# Patient Record
Sex: FEMALE | Race: BLACK OR AFRICAN AMERICAN | Employment: FULL TIME | URBAN - METROPOLITAN AREA
[De-identification: names, ages, dates, MRNs, and addresses within clinical notes are randomized per-mention and may not be internally consistent; named-entity substitution may affect disease eponyms.]

---

## 2017-01-10 ENCOUNTER — HOSPITAL ENCOUNTER (OUTPATIENT)
Dept: MRI IMAGING | Age: 52
Discharge: HOME | End: 2017-01-10
Attending: FAMILY MEDICINE
Payer: COMMERCIAL

## 2017-01-10 DIAGNOSIS — M54.32 LEFT SIDED SCIATICA: ICD-10-CM

## 2017-01-10 PROCEDURE — 72148 MRI LUMBAR SPINE W/O DYE: CPT

## 2018-07-03 ENCOUNTER — HOSPITAL ENCOUNTER (EMERGENCY)
Age: 53
Discharge: HOME | End: 2018-07-03
Attending: EMERGENCY MEDICINE
Payer: COMMERCIAL

## 2018-07-03 ENCOUNTER — APPOINTMENT (OUTPATIENT)
Dept: GENERAL RADIOLOGY | Age: 53
End: 2018-07-03
Attending: PHYSICIAN ASSISTANT
Payer: COMMERCIAL

## 2018-07-03 VITALS
OXYGEN SATURATION: 100 % | HEART RATE: 88 BPM | HEIGHT: 60 IN | WEIGHT: 270 LBS | RESPIRATION RATE: 17 BRPM | BODY MASS INDEX: 53.01 KG/M2 | TEMPERATURE: 98.4 F | DIASTOLIC BLOOD PRESSURE: 83 MMHG | SYSTOLIC BLOOD PRESSURE: 152 MMHG

## 2018-07-03 DIAGNOSIS — K59.00 CONSTIPATION, UNSPECIFIED CONSTIPATION TYPE: ICD-10-CM

## 2018-07-03 DIAGNOSIS — R10.13 ABDOMINAL PAIN, EPIGASTRIC: Primary | ICD-10-CM

## 2018-07-03 LAB
ALBUMIN SERPL-MCNC: 3.9 G/DL (ref 3.5–5)
ALBUMIN/GLOB SERPL: 1 {RATIO} (ref 1.1–2.2)
ALP SERPL-CCNC: 135 U/L (ref 45–117)
ALT SERPL-CCNC: 38 U/L (ref 12–78)
ANION GAP SERPL CALC-SCNC: 11 MMOL/L (ref 5–15)
AST SERPL-CCNC: 33 U/L (ref 15–37)
BASOPHILS # BLD: 0 K/UL (ref 0–0.1)
BASOPHILS NFR BLD: 0 % (ref 0–1)
BILIRUB SERPL-MCNC: 0.2 MG/DL (ref 0.2–1)
BUN SERPL-MCNC: 16 MG/DL (ref 6–20)
BUN/CREAT SERPL: 17 (ref 12–20)
CALCIUM SERPL-MCNC: 9.3 MG/DL (ref 8.5–10.1)
CHLORIDE SERPL-SCNC: 104 MMOL/L (ref 97–108)
CO2 SERPL-SCNC: 23 MMOL/L (ref 21–32)
CREAT SERPL-MCNC: 0.95 MG/DL (ref 0.55–1.02)
DIFFERENTIAL METHOD BLD: ABNORMAL
EOSINOPHIL # BLD: 0.1 K/UL (ref 0–0.4)
EOSINOPHIL NFR BLD: 2 % (ref 0–7)
ERYTHROCYTE [DISTWIDTH] IN BLOOD BY AUTOMATED COUNT: 12.6 % (ref 11.5–14.5)
GLOBULIN SER CALC-MCNC: 4.1 G/DL (ref 2–4)
GLUCOSE SERPL-MCNC: 90 MG/DL (ref 65–100)
HCT VFR BLD AUTO: 41.6 % (ref 35–47)
HGB BLD-MCNC: 13 G/DL (ref 11.5–16)
IMM GRANULOCYTES # BLD: 0 K/UL (ref 0–0.04)
IMM GRANULOCYTES NFR BLD AUTO: 0 % (ref 0–0.5)
LIPASE SERPL-CCNC: 179 U/L (ref 73–393)
LYMPHOCYTES # BLD: 4.1 K/UL (ref 0.8–3.5)
LYMPHOCYTES NFR BLD: 52 % (ref 12–49)
MCH RBC QN AUTO: 29 PG (ref 26–34)
MCHC RBC AUTO-ENTMCNC: 31.3 G/DL (ref 30–36.5)
MCV RBC AUTO: 92.9 FL (ref 80–99)
MONOCYTES # BLD: 0.5 K/UL (ref 0–1)
MONOCYTES NFR BLD: 7 % (ref 5–13)
NEUTS SEG # BLD: 3.1 K/UL (ref 1.8–8)
NEUTS SEG NFR BLD: 39 % (ref 32–75)
NRBC # BLD: 0 K/UL (ref 0–0.01)
NRBC BLD-RTO: 0 PER 100 WBC
PLATELET # BLD AUTO: 284 K/UL (ref 150–400)
PMV BLD AUTO: 10.4 FL (ref 8.9–12.9)
POTASSIUM SERPL-SCNC: 4.3 MMOL/L (ref 3.5–5.1)
PROT SERPL-MCNC: 8 G/DL (ref 6.4–8.2)
RBC # BLD AUTO: 4.48 M/UL (ref 3.8–5.2)
SODIUM SERPL-SCNC: 138 MMOL/L (ref 136–145)
TROPONIN I SERPL-MCNC: <0.05 NG/ML
WBC # BLD AUTO: 7.9 K/UL (ref 3.6–11)

## 2018-07-03 PROCEDURE — 74011250636 HC RX REV CODE- 250/636: Performed by: PHYSICIAN ASSISTANT

## 2018-07-03 PROCEDURE — 99284 EMERGENCY DEPT VISIT MOD MDM: CPT

## 2018-07-03 PROCEDURE — 96375 TX/PRO/DX INJ NEW DRUG ADDON: CPT

## 2018-07-03 PROCEDURE — 84484 ASSAY OF TROPONIN QUANT: CPT | Performed by: PHYSICIAN ASSISTANT

## 2018-07-03 PROCEDURE — 83690 ASSAY OF LIPASE: CPT | Performed by: PHYSICIAN ASSISTANT

## 2018-07-03 PROCEDURE — 36415 COLL VENOUS BLD VENIPUNCTURE: CPT | Performed by: PHYSICIAN ASSISTANT

## 2018-07-03 PROCEDURE — 74022 RADEX COMPL AQT ABD SERIES: CPT

## 2018-07-03 PROCEDURE — 96374 THER/PROPH/DIAG INJ IV PUSH: CPT

## 2018-07-03 PROCEDURE — 74011000250 HC RX REV CODE- 250: Performed by: PHYSICIAN ASSISTANT

## 2018-07-03 PROCEDURE — 74011250637 HC RX REV CODE- 250/637: Performed by: PHYSICIAN ASSISTANT

## 2018-07-03 PROCEDURE — 85025 COMPLETE CBC W/AUTO DIFF WBC: CPT | Performed by: PHYSICIAN ASSISTANT

## 2018-07-03 PROCEDURE — 93005 ELECTROCARDIOGRAM TRACING: CPT

## 2018-07-03 PROCEDURE — 80053 COMPREHEN METABOLIC PANEL: CPT | Performed by: PHYSICIAN ASSISTANT

## 2018-07-03 RX ORDER — FAMOTIDINE 20 MG/1
20 TABLET, FILM COATED ORAL 2 TIMES DAILY
Qty: 20 TAB | Refills: 0 | Status: SHIPPED | OUTPATIENT
Start: 2018-07-03 | End: 2018-07-13

## 2018-07-03 RX ORDER — ONDANSETRON 2 MG/ML
4 INJECTION INTRAMUSCULAR; INTRAVENOUS
Status: COMPLETED | OUTPATIENT
Start: 2018-07-03 | End: 2018-07-03

## 2018-07-03 RX ORDER — FAMOTIDINE 10 MG/ML
20 INJECTION INTRAVENOUS
Status: COMPLETED | OUTPATIENT
Start: 2018-07-03 | End: 2018-07-03

## 2018-07-03 RX ORDER — POLYETHYLENE GLYCOL 3350 17 G/17G
17 POWDER, FOR SOLUTION ORAL DAILY
Qty: 119 G | Refills: 0 | Status: SHIPPED | OUTPATIENT
Start: 2018-07-03 | End: 2018-10-12

## 2018-07-03 RX ADMIN — ONDANSETRON 4 MG: 2 INJECTION, SOLUTION INTRAMUSCULAR; INTRAVENOUS at 20:48

## 2018-07-03 RX ADMIN — FAMOTIDINE 20 MG: 10 INJECTION INTRAVENOUS at 20:49

## 2018-07-03 RX ADMIN — LIDOCAINE HYDROCHLORIDE 40 ML: 20 SOLUTION ORAL; TOPICAL at 21:39

## 2018-07-04 NOTE — ED TRIAGE NOTES
Pt c/o of mid epigastric pain from hiatal hernia that radiates into chest Pain started Saturday. Pt seen at [facility] and told to come to ED based on EKG. Pt reports nausea.

## 2018-07-04 NOTE — DISCHARGE INSTRUCTIONS
Abdominal Pain: Care Instructions  Your Care Instructions    Abdominal pain has many possible causes. Some aren't serious and get better on their own in a few days. Others need more testing and treatment. If your pain continues or gets worse, you need to be rechecked and may need more tests to find out what is wrong. You may need surgery to correct the problem. Don't ignore new symptoms, such as fever, nausea and vomiting, urination problems, pain that gets worse, and dizziness. These may be signs of a more serious problem. Your doctor may have recommended a follow-up visit in the next 8 to 12 hours. If you are not getting better, you may need more tests or treatment. The doctor has checked you carefully, but problems can develop later. If you notice any problems or new symptoms, get medical treatment right away. Follow-up care is a key part of your treatment and safety. Be sure to make and go to all appointments, and call your doctor if you are having problems. It's also a good idea to know your test results and keep a list of the medicines you take. How can you care for yourself at home? · Rest until you feel better. · To prevent dehydration, drink plenty of fluids, enough so that your urine is light yellow or clear like water. Choose water and other caffeine-free clear liquids until you feel better. If you have kidney, heart, or liver disease and have to limit fluids, talk with your doctor before you increase the amount of fluids you drink. · If your stomach is upset, eat mild foods, such as rice, dry toast or crackers, bananas, and applesauce. Try eating several small meals instead of two or three large ones. · Wait until 48 hours after all symptoms have gone away before you have spicy foods, alcohol, and drinks that contain caffeine. · Do not eat foods that are high in fat. · Avoid anti-inflammatory medicines such as aspirin, ibuprofen (Advil, Motrin), and naproxen (Aleve).  These can cause stomach upset. Talk to your doctor if you take daily aspirin for another health problem. When should you call for help? Call 911 anytime you think you may need emergency care. For example, call if:  ? · You passed out (lost consciousness). ? · You pass maroon or very bloody stools. ? · You vomit blood or what looks like coffee grounds. ? · You have new, severe belly pain. ?Call your doctor now or seek immediate medical care if:  ? · Your pain gets worse, especially if it becomes focused in one area of your belly. ? · You have a new or higher fever. ? · Your stools are black and look like tar, or they have streaks of blood. ? · You have unexpected vaginal bleeding. ? · You have symptoms of a urinary tract infection. These may include:  ¨ Pain when you urinate. ¨ Urinating more often than usual.  ¨ Blood in your urine. ? · You are dizzy or lightheaded, or you feel like you may faint. ? Watch closely for changes in your health, and be sure to contact your doctor if:  ? · You are not getting better after 1 day (24 hours). Where can you learn more? Go to http://mikey-luis.info/. Enter D347 in the search box to learn more about \"Abdominal Pain: Care Instructions. \"  Current as of: March 20, 2017  Content Version: 11.4  © 6387-8462 [employer]. Care instructions adapted under license by [employer] (which disclaims liability or warranty for this information). If you have questions about a medical condition or this instruction, always ask your healthcare professional. [place] [address] any warranty or liability for your use of this information. Constipation: Care Instructions  Your Care Instructions    Constipation means that you have a hard time passing stools (bowel movements). People pass stools from 3 times a day to once every 3 days. What is normal for you may be different.  Constipation may occur with pain in the rectum and cramping. The pain may get worse when you try to pass stools. Sometimes there are small amounts of bright red blood on toilet paper or the surface of stools. This is because of enlarged veins near the rectum (hemorrhoids). A few changes in your diet and lifestyle may help you avoid ongoing constipation. Your doctor may also prescribe medicine to help loosen your stool. Some medicines can cause constipation. These include pain medicines and antidepressants. Tell your doctor about all the medicines you take. Your doctor may want to make a medicine change to ease your symptoms. Follow-up care is a key part of your treatment and safety. Be sure to make and go to all appointments, and call your doctor if you are having problems. It's also a good idea to know your test results and keep a list of the medicines you take. How can you care for yourself at home? · Drink plenty of fluids, enough so that your urine is light yellow or clear like water. If you have kidney, heart, or liver disease and have to limit fluids, talk with your doctor before you increase the amount of fluids you drink. · Include high-fiber foods in your diet each day. These include fruits, vegetables, beans, and whole grains. · Get at least 30 minutes of exercise on most days of the week. Walking is a good choice. You also may want to do other activities, such as running, swimming, cycling, or playing tennis or team sports. · Take a fiber supplement, such as Citrucel or Metamucil, every day. Read and follow all instructions on the label. · Schedule time each day for a bowel movement. A daily routine may help. Take your time having your bowel movement. · Support your feet with a small step stool when you sit on the toilet. This helps flex your hips and places your pelvis in a squatting position. · Your doctor may recommend an over-the-counter laxative to relieve your constipation. Examples are Milk of Magnesia and MiraLax.  Read and follow all instructions on the label. Do not use laxatives on a long-term basis. When should you call for help? Call your doctor now or seek immediate medical care if:  ? · You have new or worse belly pain. ? · You have new or worse nausea or vomiting. ? · You have blood in your stools. ? Watch closely for changes in your health, and be sure to contact your doctor if:  ? · Your constipation is getting worse. ? · You do not get better as expected. Where can you learn more? Go to http://mikey-luis.info/. Enter 21  in the search box to learn more about \"Constipation: Care Instructions. \"  Current as of: March 20, 2017  Content Version: 11.4  © 5840-4047 [employer]. Care instructions adapted under license by [employer] (which disclaims liability or warranty for this information). If you have questions about a medical condition or this instruction, always ask your healthcare professional. [place] [address] any warranty or liability for your use of this information. We hope that we have addressed all of your medical concerns. The examination and treatment you received in the Emergency Department were for an emergent problem and were not intended as complete care. It is important that you follow up with your healthcare provider(s) for ongoing care. If your symptoms worsen or do not improve as expected, and you are unable to reach your usual health care provider(s), you should return to the Emergency Department. Today's healthcare is undergoing tremendous change, and patient satisfaction surveys are one of the many tools to assess the quality of medical care. You may receive a survey from the [employer] organization regarding your experience in the Emergency Department. I hope that your experience has been completely positive, particularly the medical care that I provided.   As such, please participate in the survey; anything less than excellent does not meet my expectations or intentions. [facility] and [facility] [place] participate in nationally recognized quality of care measures. If your blood pressure is greater than 120/80, as reported below, we urge that you seek medical care to address the potential of high blood pressure, commonly known as hypertension. Hypertension can be hereditary or can be caused by certain medical conditions, pain, stress, or \"white coat syndrome. \"       Please make an appointment with your health care provider(s) for follow up of your Emergency Department visit. VITALS:   Patient Vitals for the past 8 hrs:   Temp Pulse Resp BP SpO2   07/03/18 2111 - 93 25 140/83 99 %   07/03/18 2011 98.4 °F (36.9 °C) 93 18 (!) 192/115 99 %          Thank you for allowing us to provide you with medical care today. We realize that you have many choices for your emergency care needs. Please choose us in the future for any continued health care needs. Anand Mcqueen, [address] [place] [address].   Office: [phone]            Recent Results (from the past 24 hour(s))   EKG, 12 LEAD, INITIAL    Collection Time: 07/03/18  8:20 PM   Result Value Ref Range    Ventricular Rate 93 BPM    Atrial Rate 93 BPM    P-R Interval 164 ms    QRS Duration 80 ms    Q-T Interval 380 ms    QTC Calculation (Bezet) 472 ms    Calculated P Axis 40 degrees    Calculated R Axis -14 degrees    Calculated T Axis 3 degrees    Diagnosis       Normal sinus rhythm  Possible Left atrial enlargement  Inferior infarct , age undetermined  Abnormal ECG  When compared with ECG of 18-JUL-2016 10:02,  Inferior infarct is now present     CBC WITH AUTOMATED DIFF    Collection Time: 07/03/18  8:40 PM   Result Value Ref Range    WBC 7.9 3.6 - 11.0 K/uL    RBC 4.48 3.80 - 5.20 M/uL    HGB 13.0 11.5 - 16.0 g/dL    HCT 41.6 35.0 - 47.0 %    MCV 92.9 80.0 - 99.0 FL    MCH 29.0 26.0 - 34.0 PG MCHC 31.3 30.0 - 36.5 g/dL    RDW 12.6 11.5 - 14.5 %    PLATELET 383 119 - 247 K/uL    MPV 10.4 8.9 - 12.9 FL    NRBC 0.0 0  WBC    ABSOLUTE NRBC 0.00 0.00 - 0.01 K/uL    NEUTROPHILS 39 32 - 75 %    LYMPHOCYTES 52 (H) 12 - 49 %    MONOCYTES 7 5 - 13 %    EOSINOPHILS 2 0 - 7 %    BASOPHILS 0 0 - 1 %    IMMATURE GRANULOCYTES 0 0.0 - 0.5 %    ABS. NEUTROPHILS 3.1 1.8 - 8.0 K/UL    ABS. LYMPHOCYTES 4.1 (H) 0.8 - 3.5 K/UL    ABS. MONOCYTES 0.5 0.0 - 1.0 K/UL    ABS. EOSINOPHILS 0.1 0.0 - 0.4 K/UL    ABS. BASOPHILS 0.0 0.0 - 0.1 K/UL    ABS. IMM. GRANS. 0.0 0.00 - 0.04 K/UL    DF AUTOMATED     METABOLIC PANEL, COMPREHENSIVE    Collection Time: 07/03/18  8:40 PM   Result Value Ref Range    Sodium 138 136 - 145 mmol/L    Potassium 4.3 3.5 - 5.1 mmol/L    Chloride 104 97 - 108 mmol/L    CO2 23 21 - 32 mmol/L    Anion gap 11 5 - 15 mmol/L    Glucose 90 65 - 100 mg/dL    BUN 16 6 - 20 MG/DL    Creatinine 0.95 0.55 - 1.02 MG/DL    BUN/Creatinine ratio 17 12 - 20      GFR est AA >60 >60 ml/min/1.73m2    GFR est non-AA >60 >60 ml/min/1.73m2    Calcium 9.3 8.5 - 10.1 MG/DL    Bilirubin, total 0.2 0.2 - 1.0 MG/DL    ALT (SGPT) 38 12 - 78 U/L    AST (SGOT) 33 15 - 37 U/L    Alk. phosphatase 135 (H) 45 - 117 U/L    Protein, total 8.0 6.4 - 8.2 g/dL    Albumin 3.9 3.5 - 5.0 g/dL    Globulin 4.1 (H) 2.0 - 4.0 g/dL    A-G Ratio 1.0 (L) 1.1 - 2.2     LIPASE    Collection Time: 07/03/18  8:40 PM   Result Value Ref Range    Lipase 179 73 - 393 U/L   TROPONIN I    Collection Time: 07/03/18  8:40 PM   Result Value Ref Range    Troponin-I, Qt. <0.05 <0.05 ng/mL       Xr Abd Acute W 1 V Chest    Result Date: 7/3/2018  EXAM:  XR ABD ACUTE W 1 V CHEST INDICATION:  abdominal pain COMPARISON: 7/18/2016. FINDINGS: The upright chest radiograph demonstrates normal heart size. Interstitial thickening is unchanged in the perihilar regions bilaterally. No acute abnormality is identified.  Supine and upright views of the abdomen demonstrate a nonobstructive bowel gas pattern. There is no free intraperitoneal air. Surgical clips project over the right upper quadrant. The bones are within normal limits. Moderate fecal stasis is noted.      IMPRESSION: No acute process or change compared to the prior exam.

## 2018-07-04 NOTE — ED NOTES
PT REPORTS FEELING MUCH BETTER AFTER GI COCKTAIL. PROVIDER MADE AWARE. PROVIDED WITH DC INSTRUCTIONS BY PROVIDER. VERBALIZED UNDERSTANDING.  AMBULATED OUT OF ED WITH STEADY UPRIGHT GAIT

## 2018-07-04 NOTE — ED PROVIDER NOTES
HPI Comments: Harry Klein is a 50 y.o. female  who presents by private vehicle to ER with c/o Patient presents with:  Abdominal Pain  Chest Pain  Patient presents with complaints of epigastric abdominal pain. Patient reports symptoms started Saturday. Patient has history of hiatal hernia. Denies shortness of breath. She specifically denies any fevers, chills, nausea, vomiting,  shortness of breath, headache, rash, diarrhea, , urinary/bowel changes, sweating or weight loss. PCP: Daniel Bauer MD   PMHx significant for: Past Medical History:  2013: Asthma  No date: Diabetes ([facility] [address]  No date: Dyslipidemia  No date: Hypertension  2014: Obesity  No date: JENNIFER on CPAP   PSHx significant for: Past Surgical History:  : DELIVERY   : DELIVERY   No date: ENDOSCOPY, COLON, DIAGNOSTIC  : HX CHOLECYSTECTOMY  : HX HYSTERECTOMY      Comment: complete, endometriosis. Social Hx: Tobacco use: Smoking status: Never Smoker                                                              Smokeless status: Never Used                      ; EtOH use: The patient states she drinks 0 per week.; Illicit Drug use: Allergies:   -- Codeine -- Rash   -- Demerol (Meperidine) -- Rash   -- Pcn (Penicillins) -- Hives    There are no other complaints, changes or physical findings at this time. Patient is a 50 y.o. female presenting with abdominal pain and chest pain. The history is provided by the patient. Abdominal Pain    This is a new problem. The current episode started more than 2 days ago. The problem occurs constantly. The problem has not changed since onset. The pain is associated with an unknown factor. The pain is located in the epigastric region. The quality of the pain is sharp. The pain is at a severity of 7/10. The pain is severe. Associated symptoms include nausea and chest pain.  Pertinent negatives include no anorexia, no fever, no belching, no diarrhea, no flatus, no hematochezia, no melena, no vomiting, no constipation, no hematuria, no headaches, no arthralgias, no myalgias, no trauma, no testicular pain and no back pain. Nothing worsens the pain. The pain is relieved by nothing. Past workup includes no CT scan, no ultrasound. The patient's surgical history non-contributory. Chest Pain    Associated symptoms include abdominal pain and nausea. Pertinent negatives include no back pain, no fever, no headaches and no vomiting. Past Medical History:   Diagnosis Date    Asthma 2013    Diabetes ([address]     Dyslipidemia     Hypertension     Obesity 2014    JENNIFER on CPAP        Past Surgical History:   Procedure Laterality Date    DELIVERY   48    DELIVERY   14    ENDOSCOPY, COLON, DIAGNOSTIC      HX CHOLECYSTECTOMY      HX HYSTERECTOMY      complete, endometriosis. Family History:   Problem Relation Age of Onset    Other Mother      CAD   [facility] Hypertension Mother     Hypertension Father     Kidney Disease Father      End Stage Renal, Dialysis    Hypertension Brother     Diabetes Paternal Uncle     Other Maternal Grandmother      CAD    Heart Attack Maternal Grandmother     Cancer Maternal Grandfather      Throat Cancer    Dementia Paternal Grandmother      Alzheimer's Dementia    Cancer Paternal Grandfather      Colon Cancer    Diabetes Paternal Uncle        Social History     Social History    Marital status:      Spouse name: N/A    Number of children: N/A    Years of education: N/A     Occupational History    Not on file.      Social History Main Topics    Smoking status: Never Smoker    Smokeless tobacco: Never Used    Alcohol use No    Drug use: No    Sexual activity: Yes     Partners: Male     Birth control/ protection: Surgical     Other Topics Concern    Not on file     Social History Narrative         ALLERGIES: Codeine; Demerol [meperidine]; and Pcn [penicillins]    Review of Systems Constitutional: Negative. Negative for fever. HENT: Negative. Eyes: Negative. Respiratory: Negative. Cardiovascular: Positive for chest pain. Gastrointestinal: Positive for abdominal pain and nausea. Negative for anorexia, constipation, diarrhea, flatus, hematochezia, melena and vomiting. Endocrine: Negative. Genitourinary: Negative. Negative for hematuria and testicular pain. Musculoskeletal: Negative. Negative for arthralgias, back pain and myalgias. Skin: Negative. Allergic/Immunologic: Negative. Neurological: Negative. Negative for headaches. Hematological: Negative. Psychiatric/Behavioral: Negative. All other systems reviewed and are negative. Vitals:    07/03/18 2011 07/03/18 2111 07/03/18 2115 07/03/18 2145   BP: (!) 192/115 140/83 152/83    Pulse: 93 93 88 88   Resp: 18 25 19 17   Temp: 98.4 °F (36.9 °C)      SpO2: 99% 99% 99% 100%   Weight: 122.5 kg (270 lb)      Height: 5' (1.524 m)               Physical Exam   Constitutional: She is oriented to person, place, and time. She appears well-developed. HENT:   Head: Normocephalic and atraumatic. Right Ear: External ear normal.   Left Ear: External ear normal.   Nose: Nose normal.   Mouth/Throat: Oropharynx is clear and moist. No oropharyngeal exudate. Eyes: Conjunctivae, EOM and lids are normal. Right eye exhibits no discharge. Left eye exhibits no discharge. Neck: Normal range of motion. No tracheal deviation present. No thyromegaly present. Cardiovascular: Normal rate, regular rhythm, normal heart sounds and intact distal pulses. Pulmonary/Chest: Effort normal and breath sounds normal.   Abdominal: Soft. Normal appearance and bowel sounds are normal. There is tenderness in the epigastric area. Musculoskeletal: Normal range of motion. Neurological: She is alert and oriented to person, place, and time. Skin: Skin is warm and dry. Psychiatric: She has a normal mood and affect.  Judgment normal. MDM  Number of Diagnoses or Management Options  Abdominal pain, epigastric:   Constipation, unspecified constipation type:   Diagnosis management comments: Assesment/Plan- 50 y.o. Patient presents with:  Abdominal Pain  Chest Pain  differential includes: pancreatitis, cholecystitis, GERD, cardiac chest pain. Labs and imaging reviewed with xray showing constipation, patient feeling significantly improved after medications in ED. PE findings consistent with gastritis. Recommend PCP follow up. Patient educated on reasons to return to the ED.          Amount and/or Complexity of Data Reviewed  Clinical lab tests: ordered and reviewed  Tests in the radiology section of CPT®: ordered and reviewed  Tests in the medicine section of CPT®: ordered and reviewed  Discuss the patient with other providers: yes (Attending- Dr. Jesenia Gomez who also saw patient and agrees with plan)          ED Course       Procedures

## 2018-07-05 LAB
ATRIAL RATE: 93 BPM
CALCULATED P AXIS, ECG09: 40 DEGREES
CALCULATED R AXIS, ECG10: -14 DEGREES
CALCULATED T AXIS, ECG11: 3 DEGREES
DIAGNOSIS, 93000: NORMAL
P-R INTERVAL, ECG05: 164 MS
Q-T INTERVAL, ECG07: 380 MS
QRS DURATION, ECG06: 80 MS
QTC CALCULATION (BEZET), ECG08: 472 MS
VENTRICULAR RATE, ECG03: 93 BPM

## 2018-08-07 NOTE — PERIOP NOTES
[address] [place] [address] Dr Mahan Preprocedure Instructions      1. On the day of your surgery, please report to registration located on the 2nd floor of the  [facility]. yes    2. You must have a responsible adult to drive you to the hospital, stay at the hospital during your procedure and drive you home. If they leave your procedure will not be started (no exceptions). yes    3. Do not have anything to eat or drink (including water, gum, mints, coffee, and juice) after midnight. This does not apply to the medications you were instructed to take by your physician. yesIf you are currently taking Plavix, Coumadin, Aspirin, or other blood-thinning agents, contact your physician for special instructions. not applicable,    4. If you are having a procedure that requires bowel prep: We recommend that you have only clear liquids the day before your procedure and begin your bowel prep by 5:00 pm.  You may continue to drink clear liquids until midnight. If for any reason you are not able to complete your prep please notify your physician immediately. yes    5. Have a list of all current medications, including vitamins, herbal supplements and any other over the counter medications. yes    6. If you wear glasses, contacts, dentures and/or hearing aids, they may be removed prior to procedure, please bring a case to store them in. yes    7. You should understand that if you do not follow these instructions your procedure may be cancelled. If your physical condition changes (I.e. fever, cold or flu) please contact your doctor as soon as possible. 8. It is important that you be on time.   If for any reason you are unable to keep your appointment please call )- the day of or your physicians office prior to your scheduled procedure

## 2018-08-09 ENCOUNTER — APPOINTMENT (OUTPATIENT)
Dept: CT IMAGING | Age: 53
End: 2018-08-09
Attending: INTERNAL MEDICINE
Payer: COMMERCIAL

## 2018-08-09 ENCOUNTER — ANESTHESIA (OUTPATIENT)
Dept: ENDOSCOPY | Age: 53
End: 2018-08-09
Payer: COMMERCIAL

## 2018-08-09 ENCOUNTER — ANESTHESIA EVENT (OUTPATIENT)
Dept: ENDOSCOPY | Age: 53
End: 2018-08-09
Payer: COMMERCIAL

## 2018-08-09 ENCOUNTER — HOSPITAL ENCOUNTER (OUTPATIENT)
Age: 53
Setting detail: OUTPATIENT SURGERY
Discharge: HOME | End: 2018-08-09
Attending: INTERNAL MEDICINE | Admitting: INTERNAL MEDICINE
Payer: COMMERCIAL

## 2018-08-09 VITALS
HEART RATE: 89 BPM | HEIGHT: 60 IN | OXYGEN SATURATION: 99 % | TEMPERATURE: 98.3 F | DIASTOLIC BLOOD PRESSURE: 82 MMHG | RESPIRATION RATE: 23 BRPM | WEIGHT: 286 LBS | SYSTOLIC BLOOD PRESSURE: 132 MMHG | BODY MASS INDEX: 56.15 KG/M2

## 2018-08-09 LAB
H PYLORI FROM TISSUE: NEGATIVE
KIT LOT NO., HCLOLOT: NORMAL
NEGATIVE CONTROL: NEGATIVE
POSITIVE CONTROL: POSITIVE

## 2018-08-09 PROCEDURE — 74177 CT ABD & PELVIS W/CONTRAST: CPT

## 2018-08-09 PROCEDURE — 74011636320 HC RX REV CODE- 636/320: Performed by: RADIOLOGY

## 2018-08-09 PROCEDURE — 87077 CULTURE AEROBIC IDENTIFY: CPT | Performed by: INTERNAL MEDICINE

## 2018-08-09 PROCEDURE — 74011000250 HC RX REV CODE- 250: Performed by: ANESTHESIOLOGY

## 2018-08-09 PROCEDURE — 74011250636 HC RX REV CODE- 250/636

## 2018-08-09 PROCEDURE — 77030009426 HC FCPS BIOP ENDOSC BSC -B: Performed by: INTERNAL MEDICINE

## 2018-08-09 PROCEDURE — 76060000031 HC ANESTHESIA FIRST 0.5 HR: Performed by: INTERNAL MEDICINE

## 2018-08-09 PROCEDURE — 88305 TISSUE EXAM BY PATHOLOGIST: CPT | Performed by: INTERNAL MEDICINE

## 2018-08-09 PROCEDURE — 74011250636 HC RX REV CODE- 250/636: Performed by: INTERNAL MEDICINE

## 2018-08-09 PROCEDURE — 76040000019: Performed by: INTERNAL MEDICINE

## 2018-08-09 RX ORDER — EPINEPHRINE 0.1 MG/ML
1 INJECTION INTRACARDIAC; INTRAVENOUS
Status: DISCONTINUED | OUTPATIENT
Start: 2018-08-09 | End: 2018-08-09 | Stop reason: HOSPADM

## 2018-08-09 RX ORDER — SODIUM CHLORIDE 9 MG/ML
INJECTION, SOLUTION INTRAVENOUS
Status: DISCONTINUED | OUTPATIENT
Start: 2018-08-09 | End: 2018-08-09 | Stop reason: HOSPADM

## 2018-08-09 RX ORDER — ALBUTEROL SULFATE 0.83 MG/ML
2.5 SOLUTION RESPIRATORY (INHALATION)
Status: COMPLETED | OUTPATIENT
Start: 2018-08-09 | End: 2018-08-09

## 2018-08-09 RX ORDER — MIDAZOLAM HYDROCHLORIDE 1 MG/ML
.25-5 INJECTION, SOLUTION INTRAMUSCULAR; INTRAVENOUS
Status: DISCONTINUED | OUTPATIENT
Start: 2018-08-09 | End: 2018-08-09 | Stop reason: HOSPADM

## 2018-08-09 RX ORDER — SODIUM CHLORIDE 9 MG/ML
50 INJECTION, SOLUTION INTRAVENOUS CONTINUOUS
Status: DISPENSED | OUTPATIENT
Start: 2018-08-09 | End: 2018-08-09

## 2018-08-09 RX ORDER — PANTOPRAZOLE SODIUM 40 MG/1
40 TABLET, DELAYED RELEASE ORAL
COMMUNITY
End: 2018-10-12

## 2018-08-09 RX ORDER — DEXTROMETHORPHAN/PSEUDOEPHED 2.5-7.5/.8
1.2 DROPS ORAL
Status: DISCONTINUED | OUTPATIENT
Start: 2018-08-09 | End: 2018-08-09 | Stop reason: HOSPADM

## 2018-08-09 RX ORDER — RANITIDINE 150 MG/1
150 TABLET, FILM COATED ORAL
COMMUNITY
End: 2020-01-17

## 2018-08-09 RX ORDER — FLUMAZENIL 0.1 MG/ML
0.2 INJECTION INTRAVENOUS
Status: ACTIVE | OUTPATIENT
Start: 2018-08-09 | End: 2018-08-09

## 2018-08-09 RX ORDER — NALOXONE HYDROCHLORIDE 0.4 MG/ML
0.4 INJECTION, SOLUTION INTRAMUSCULAR; INTRAVENOUS; SUBCUTANEOUS
Status: ACTIVE | OUTPATIENT
Start: 2018-08-09 | End: 2018-08-09

## 2018-08-09 RX ORDER — PROPOFOL 10 MG/ML
INJECTION, EMULSION INTRAVENOUS AS NEEDED
Status: DISCONTINUED | OUTPATIENT
Start: 2018-08-09 | End: 2018-08-09 | Stop reason: HOSPADM

## 2018-08-09 RX ORDER — ATROPINE SULFATE 0.1 MG/ML
0.4 INJECTION INTRAVENOUS
Status: DISCONTINUED | OUTPATIENT
Start: 2018-08-09 | End: 2018-08-09 | Stop reason: HOSPADM

## 2018-08-09 RX ADMIN — ALBUTEROL SULFATE 2.5 MG: 2.5 SOLUTION RESPIRATORY (INHALATION) at 09:58

## 2018-08-09 RX ADMIN — PROPOFOL 50 MG: 10 INJECTION, EMULSION INTRAVENOUS at 08:58

## 2018-08-09 RX ADMIN — IOPAMIDOL 100 ML: 755 INJECTION, SOLUTION INTRAVENOUS at 12:55

## 2018-08-09 RX ADMIN — PROPOFOL 50 MG: 10 INJECTION, EMULSION INTRAVENOUS at 08:51

## 2018-08-09 RX ADMIN — SODIUM CHLORIDE: 9 INJECTION, SOLUTION INTRAVENOUS at 08:40

## 2018-08-09 RX ADMIN — PROPOFOL 100 MG: 10 INJECTION, EMULSION INTRAVENOUS at 08:45

## 2018-08-09 RX ADMIN — SODIUM CHLORIDE 50 ML/HR: 900 INJECTION, SOLUTION INTRAVENOUS at 07:45

## 2018-08-09 RX ADMIN — PROPOFOL 50 MG: 10 INJECTION, EMULSION INTRAVENOUS at 08:49

## 2018-08-09 RX ADMIN — PROPOFOL 50 MG: 10 INJECTION, EMULSION INTRAVENOUS at 08:55

## 2018-08-09 NOTE — PERIOP NOTES
At [address] states she felt better, but still feels tight. At first refused neb but states she wants it now.

## 2018-08-09 NOTE — DISCHARGE INSTRUCTIONS
Chastity Geiger M.D.  [phone]           2018  Cailin Stanford  :  1965  [facility] Medical Record Number:  [id number]        ENDOSCOPY FINDINGS:   Your endoscopy showed a moderate size hiatal hernia, otherwise appeared within normal, biopsies were obtained. EGD DISCHARGE INSTRUCTIONS    DISCOMFORT:  Sore throat- throat lozenges or warm salt water gargle  redness at IV site- apply warm compress to area; if redness or soreness persist- contact your physician  Gaseous discomfort- walking, belching will help relieve any discomfort  You may not operate a vehicle for 12 hours  You may not engage in an occupation involving machinery or appliances for rest of today  You may not drink alcoholic beverages for at least 12 hours  Avoid making any critical decisions for at least 24 hour    DIET:   You may resume your regular diet. ACTIVITY  Spend the remainder of the day resting -  avoid any strenuous activity. Avoid driving or operating machinery. CALL M.D. ANY SIGN OF   Increasing pain, nausea, vomiting  Abdominal distension (swelling)  New increased bleeding (oral or rectal)  Fever (chills)  Pain in chest area  Bloody discharge from nose or mouth  Shortness of breath    Follow-up Instructions:   Call Dr. Elizabeth Pantoja for any questions or problems. Telephone # [phone]  If biopsies were obtained, the results will be available  in  5 to 7 days. We will call you to notify you of these results. Continue same medications. Will get CT scan of abdomen and pelvis. Follow up in the office.

## 2018-08-09 NOTE — ROUTINE PROCESS
Maggie Delay  1965  [id number]    Situation:  Verbal report received from: Gaviota Robertson RN  Procedure: Procedure(s):  ESOPHAGOGASTRODUODENOSCOPY (EGD)  ESOPHAGOGASTRODUODENAL (EGD) BIOPSY    Background:    Preoperative diagnosis: ABDOMINAL PAIN, EPIGASTRIC PAIN  Postoperative diagnosis: Hiatal hernia. :  Dr. Kelsey Recinos  Assistant(s): Endoscopy Technician-1: Dearl Or  Endoscopy RN-1: Gaviota Robertson RN    Specimens:   ID Type Source Tests Collected by Time Destination   1 : Duodenum biopsy Preservative Duodenum  Faith Padgett MD 8/9/2018 0900 Pathology     H. Pylori  yes    Assessment:  Intra-procedure medications     Anesthesia gave intra-procedure sedation and medications, see anesthesia flow sheet yes    Intravenous fluids: NS@ KVO     Vital signs stable     Abdominal assessment: round and soft     Recommendation:  Discharge patient per MD order. Family or Friend   Permission to share finding with family or friend yes    Endoscopy discharge instructions have been reviewed and given to patient and spouse. The patient and spouse verbalized understanding and acceptance of instructions.

## 2018-08-09 NOTE — H&P
The patient is a 50year old female who presents with a complaint of Abdominal Pain. The patient presents for consultation at the request of . The onset of the abdominal pain has been gradual and has been occurring for 1 month with a increasing course . The abdominal pain is described as severe dull ache (\"it was sharp and umbearable before.) and  is described as being located in the upper abdomen (radiates down her abdomen.) .  The symptoms are aggravated by meals (1/2 to 1 hour after eating). The symptoms are relieved by vomiting, bowel movements and other (Sitting Up.). The symptoms have been associated with heartburn (states it has not been worse than normal.) and vomiting,  while the symptoms have not been associated with melena. Problem List/Past Medical Francisco Vasquez; 2018 12:02 PM)   Section    Hysterectomy    Cholecystectomy    Diabetes    Hypertension    Asthma      Past Surgical History Francisco Vasquez; 2018 12:01 PM)  None  [2018]: Allergies Francisco Vasquez; 2018 12:02 PM)  Codeine    Demerol    Penicillin      Medication History Francisco Vasquez; 2018 12:01 PM)  Protonix  (40MG Tablet DR, 1 Oral daily) Active. Medications Reconciled     Family History Francisco Vasquez; 2018 12:02 PM)  Hypertension   Father, Brother, Sister, Mother  Asthma   Mother    Social History Francisco Vasquez; 2018 12:02 PM)  Marital status     Employment status   Full-time  Non smoker / no tobacco use    No alcohol use      Health Maintenance History Francisco Vasquez; 2018 12:01 PM)  Pneumovax   none  Flu Vaccine   none        Review of Systems Francisco Vasquez; 2018 12:02 PM)  General Not Present- Chronic Fatigue, Poor Appetite, Weight Gain and Weight Loss. Skin Not Present- Itching, Rash and Skin Color Changes. HEENT Not Present- Hearing Loss, Ringing in the Ears and Vertigo. Respiratory Present- Asthma.  Not Present- Chronic Cough, Difficulty Breathing, Hoarseness, Lung Cancer, TB exposure and Wheezing. Cardiovascular Present- Hypertension. Not Present- Bypass Surgery, Chest Pain, Chest Pain with Exertion, Heart Murmur, History of Heart Attack, Pacemaker, Shortness of Breath, Use of Antibiotics before Dental Procedures and Use of Blood Thinners. Gastrointestinal Present- Bloody Stool, Gallbladder Disease, Heartburn and Indigestion. Not Present- Abdominal Pain, Black, Tarry Stool, Chest Pain, Cirrhosis, Colon Cancer, Constipation, Diarrhea, Difficulty Swallowing, Dysphagia, Esophageal Blockage, Esophageal Cancer, Hepatitis, Hiatal Hernia, History of Previous Colonoscopy, History of Previous Endoscopy, History of Previous GI X-rays, Intestinal Blockage, Jaundice, Nausea, Polyps, Stomach Cancer, Ulcer, Vomiting and Weight Loss. Female Genitourinary Not Present- Dialysis, Difficulty Urinating, Dysmenorrhea, Endometriosis, Excessive Menstrual Bleeding, Kidney Stones, Pregnancy and Urinary Tract Infection. Musculoskeletal Not Present- Arthritis, Collagen Vascular Disease, Hip Replacement Surgery and Knee Replacement Surgery. Neurological Not Present- Blurred Vision, Double Vision, Frequent Headaches, Migraine Headaches, Seizures, Stroke and Vertigo. Psychiatric Not Present- Depression, Insomnia, Panic Attacks and Suicidal Ideation. Endocrine Not Present- Complications from Diabetes, Diabetes and Thyroid Problems. Hematology Not Present- Anemia, Bruising and Easy Bleeding. Vitals Ruthie Alarcon; 7/23/2018 12:04 PM)  7/23/2018 11:59 AM  Weight: 293 lb   Height: 60 in   Body Surface Area: 2.2 m²   Body Mass Index: 57.22 kg/m²    Pulse: 94 (Regular)     BP: 152/102 (Sitting, Left Arm, Standard)              Physical Exam (Sedrick Ruiz; 7/23/2018 12:51 PM)  General  Mental Status - Alert. General Appearance - Cooperative, Pleasant, Not in acute distress. Orientation - Oriented X3.     Integumentary  General Characteristics  Color - normal coloration of skin. Head and Neck  Neck  Global Assessment - supple. Eye  Sclera/Conjunctiva - Bilateral - No Jaundice. Chest and Lung Exam  Chest and lung exam reveals  - quiet, even and easy respiratory effort with no use of accessory muscles. Auscultation  Breath sounds - Normal. Adventitious sounds - No Adventitious sounds. Cardiovascular  Auscultation  Rhythm - Regular, No Tachycardia, No Bradycardia . Heart Sounds - Normal heart sounds , S1 WNL and S2 WNL, No S3, No Summation Gallop. Murmurs & Other Heart Sounds - Auscultation of the heart reveals - No Murmurs. Abdomen  Palpation/Percussion  Tenderness - Non-Tender. Rebound tenderness - No rebound. Rigidity (guarding) - No Rigidity. Dullness to percussion - No abnormal dullness to percussion. Liver - No hepatosplenomegaly. Abdominal Mass Palpable - No masses. Other Characteristics - No Ascites. Auscultation  Auscultation of the abdomen reveals - Bowel sounds normal, No Abdominal bruits and No Succussion splash. Rectal - Did not examine. Peripheral Vascular  Lower Extremity  Palpation - Edema - Left - No edema. Right - No edema. Neurologic  Motor  Involuntary Movements - Asterixis - not present. Assessment & Plan (Sedrick CLAUDIASumi Jimenez [facility]; 7/23/2018 12:53 PM)  Constipation (564.00  K59.00)  Story: The patient presents with abdominal pain that is worse when she eats, better when she has a bowel movement. No alarm symptoms. X ray done in ER shows moderate amt of stool in the colon. She does not feel relief after she has bowel movements. Impression: Plan: I have instructed her to take high doses of miralax until she gets diarrhea. Then, she is to reduce to one daily dose. She will call in one week to let me know how she is doin. Consider colonoscopy if she does not improve. Follow Up: PRN.   Current Plans  Pt Education - How to access health information online: discussed with patient and provided information.   Signed electronically by LIBRA Gan (7/23/2018 12:55 PM)

## 2018-08-09 NOTE — PERIOP NOTES
States her breathing feels better after neb. IV converted to saline lock. Ready for discharge. To radiology via wheelchair for CT, will be discharged from there.

## 2018-08-09 NOTE — IP AVS SNAPSHOT
[place] [address] [facility] 
[phone] Patient: Laurel Gee MRN: [id number] :1965 About your hospitalization You were admitted on:  2018 You last received care in the:  [facility] ENDOSCOPY You were discharged on:  2018 Why you were hospitalized Your primary diagnosis was:  Not on File Follow-up Information None Discharge Orders None A check rojelio indicates which time of day the medication should be taken. My Medications CHANGE how you take these medications Instructions Each Dose to Equal  
 Morning Noon Evening Bedtime  
 fluticasone 50 mcg/actuation nasal spray Commonly known as:  Macie Lobo What changed:   
- when to take this 
- reasons to take this Your last dose was: Your next dose is: 2 Sprays by Both Nostrils route daily. 2 Spray CONTINUE taking these medications Instructions Each Dose to Equal  
 Morning Noon Evening Bedtime  
 albuterol 90 mcg/actuation inhaler Commonly known as:  PROVENTIL HFA, VENTOLIN HFA, PROAIR HFA Your last dose was: Your next dose is: Take 2 Puffs by inhalation every six (6) hours as needed for Wheezing. 2 Puff  
    
   
   
   
  
 polyethylene glycol 17 gram/dose powder Commonly known as:  Meka Lila Your last dose was: Your next dose is: Take 17 g by mouth daily. 1 tablespoon with 8 oz of water daily 17 g PROTONIX 40 mg tablet Generic drug:  pantoprazole Your last dose was: Your next dose is: Take 40 mg by mouth daily as needed. Indications: gastroesophageal reflux disease 40 mg  
    
   
   
   
  
 psyllium Powd Commonly known as:  METAMUCIL Your last dose was: Your next dose is: Take 1 Dose by mouth daily. Indications: constipation 1 Dose ZANTAC 150 mg tablet Generic drug:  raNITIdine Your last dose was: Your next dose is: Take 150 mg by mouth two (2) times daily as needed for Indigestion. Indications: gastroesophageal reflux disease 150 mg Discharge Instructions Christine Mukherjee M.D. 
[phone] 
        
2018 Buster Arriola :  1965 [address] [place] [address] Record Number:  [id number] ENDOSCOPY FINDINGS: 
 Your endoscopy showed a moderate size hiatal hernia, otherwise appeared within normal, biopsies were obtained. EGD DISCHARGE INSTRUCTIONS DISCOMFORT: 
Sore throat- throat lozenges or warm salt water gargle 
redness at IV site- apply warm compress to area; if redness or soreness persist- contact your physician Gaseous discomfort- walking, belching will help relieve any discomfort You may not operate a vehicle for 12 hours You may not engage in an occupation involving machinery or appliances for rest of today You may not drink alcoholic beverages for at least 12 hours Avoid making any critical decisions for at least 24 hour DIET: 
 You may resume your regular diet. ACTIVITY Spend the remainder of the day resting -  avoid any strenuous activity. Avoid driving or operating machinery. CALL M.D. ANY SIGN OF Increasing pain, nausea, vomiting Abdominal distension (swelling) New increased bleeding (oral or rectal) Fever (chills) Pain in chest area Bloody discharge from nose or mouth Shortness of breath Follow-up Instructions: 
 Call Dr. Xiomara Hardin for any questions or problems. Telephone # [phone] If biopsies were obtained, the results will be available  in  5 to 7 days. We will call you to notify you of these results. Continue same medications. Will get CT scan of abdomen and pelvis. Follow up in the office. Introducing [facility]! Levar Garsia introduces [employer] patient portal. Now you can access parts of your medical record, email your doctor's office, and request medication refills online. 1. In your internet browser, go to [email or web address] 2. Click on the First Time User? Click Here link in the Sign In box. You will see the New Member Sign Up page. 3. Enter your VEEDIMS Access Code exactly as it appears below. You will not need to use this code after youve completed the sign-up process. If you do not sign up before the expiration date, you must request a new code. · VEEDIMS Access Code: [email or web address] Expires: 10/1/2018  7:52 PM 
 
4. Enter the last four digits of your Social Security Number (xxxx) and Date of Birth (mm/dd/yyyy) as indicated and click Submit. You will be taken to the next sign-up page. 5. Create a VEEDIMS ID. This will be your VEEDIMS login ID and cannot be changed, so think of one that is secure and easy to remember. 6. Create a VEEDIMS password. You can change your password at any time. 7. Enter your Password Reset Question and Answer. This can be used at a later time if you forget your password. 8. Enter your e-mail address. You will receive e-mail notification when new information is available in [address] 9. Click Sign Up. You can now view and download portions of your medical record. 10. Click the Download Summary menu link to download a portable copy of your medical information. If you have questions, please visit the Frequently Asked Questions section of the VEEDIMS website. Remember, VEEDIMS is NOT to be used for urgent needs. For medical emergencies, dial 911. Now available from your iPhone and Android! Introducing Anuj Ware As a [place] Garsia patient, I wanted to make you aware of our electronic visit tool called Anuj Ware. [employer]EAR[employer] allows you to connect within minutes with a medical provider 24 hours a day, seven days a week via a mobile device or tablet or logging into a secure website from your computer. You can access ORDISSIMO from anywhere in the [place]. A virtual visit might be right for you when you have a simple condition and feel like you just dont want to get out of bed, or cant get away from work for an appointment, when your regular Roxy Linger provider is not available (evenings, weekends or holidays), or when youre out of town and need minor care. Electronic visits cost only $49 and if the [employer]EARTH[employer] provider determines a prescription is needed to treat your condition, one can be electronically transmitted to a nearby pharmacy*. Please take a moment to enroll today if you have not already done so. The enrollment process is free and takes just a few minutes. To enroll, please download the [employer]EARTHNET ed to your tablet or phone, or visit [email or web address] to enroll on your computer. And, as an [address] [place] [address] patient with a Viewabill account, the results of your visits will be scanned into your electronic medical record and your primary care provider will be able to view the scanned results. We urge you to continue to see your regular [employer] provider for your ongoing medical care. And while your primary care provider may not be the one available when you seek a ORDISSIMO virtual visit, the peace of mind you get from getting a real diagnosis real time can be priceless. For more information on ORDISSIMO, view our Frequently Asked Questions (FAQs) at [email or web address]. Sincerely, 
 
Reid Pérez MD 
Chief Medical Officer Liza Gray *:  certain medications cannot be prescribed via ORDISSIMO Providers Seen During Your Hospitalization Provider Specialty Primary office phone Tiffanie Meeks MD Gastroenterology [phone] Your Primary Care Physician (PCP) Primary Care Physician Office Phone Office Fax Isabela Isidro [phone] You are allergic to the following Allergen Reactions Pcn (Penicillins) Hives Recent Documentation Height Weight Breastfeeding? BMI OB Status Smoking Status 1.524 m 129.7 kg No 55.86 kg/m2 Hysterectomy Never Smoker Emergency Contacts Name Discharge Info Relation Home Work Mobile [facility] DISCHARGE CAREGIVER [3] Spouse [3] [phone] Tyesha Montanez  Mother [14] [phone] Patient Belongings The following personal items are in your possession at time of discharge: 
  Dental Appliances: None  Visual Aid: None Please provide this summary of care documentation to your next provider. Signatures-by signing, you are acknowledging that this After Visit Summary has been reviewed with you and you have received a copy. Patient Signature:  ____________________________________________________________ Date:  ____________________________________________________________  
  
Marlee Coto Provider Signature:  ____________________________________________________________ Date:  ____________________________________________________________

## 2018-08-09 NOTE — PROCEDURES
Heather Currie M.D.  [phone]           2018                EGD Operative Report  Buster Arriola  :  1965  Bhavik Laureano Medical Record Number:  [id number]      Indication:  Abdominal pain, epigastric     : Duane Ashley MD    Referring Provider:  Obinna Quezada MD      Anesthesia/Sedation:  MAC anesthesia    Airway assessment: No airway problems anticipated    Pre-Procedural Exam:      Airway: clear, no airway problems anticipated  Heart: RRR, without gallops or rubs  Lungs: clear bilaterally without wheezes, crackles, or rhonchi  Abdomen: soft, nontender, nondistended, bowel sounds present  Mental Status: awake, alert and oriented to person, place and time       Procedure Details     After infomed consent was obtained for the procedure, with all risks and benefits of procedure explained the patient was taken to the endoscopy suite and placed in the left lateral decubitus position. Following sequential administration of sedation as per above, the endoscope was inserted into the mouth and advanced under direct vision to second portion of the duodenum. A careful inspection was made as the gastroscope was withdrawn, including a retroflexed view of the proximal stomach; findings and interventions are described below. Findings:   Esophagus:normal  Stomach: Moderate size hiatal hernia seen, otherwise mucosa within normal throughout the stomach. Duodenum/jejunum: normal    Therapies:  none    Specimens: YOLIS test and duodenum           Complications:   None; patient tolerated the procedure well. EBL:  None. Impression:    Moderate size hiatal hernia, otherwise mucosa within normal.    Recommendations:    -Continue acid suppression.  -Follow-up on biopsy results  -Will proceed with CT scan of abdomen and pelvis, and follow-up in the office.     Duane Ashley MD

## 2018-08-09 NOTE — PERIOP NOTES
Arrived in recovery coughing when drowsy. More awake now, still coughing and states her chest is tight. Dr. Concha Gil notified and states she will come see the patient.

## 2018-10-12 ENCOUNTER — HOSPITAL ENCOUNTER (INPATIENT)
Age: 53
LOS: 2 days | Discharge: HOME | DRG: 194 | End: 2018-10-14
Attending: EMERGENCY MEDICINE | Admitting: INTERNAL MEDICINE
Payer: COMMERCIAL

## 2018-10-12 ENCOUNTER — APPOINTMENT (OUTPATIENT)
Dept: CT IMAGING | Age: 53
DRG: 194 | End: 2018-10-12
Attending: EMERGENCY MEDICINE
Payer: COMMERCIAL

## 2018-10-12 ENCOUNTER — APPOINTMENT (OUTPATIENT)
Dept: GENERAL RADIOLOGY | Age: 53
DRG: 194 | End: 2018-10-12
Attending: EMERGENCY MEDICINE
Payer: COMMERCIAL

## 2018-10-12 DIAGNOSIS — J18.9 PNEUMONIA OF LEFT LOWER LOBE DUE TO INFECTIOUS ORGANISM: Primary | ICD-10-CM

## 2018-10-12 DIAGNOSIS — R06.82 TACHYPNEA: ICD-10-CM

## 2018-10-12 DIAGNOSIS — R79.89 ELEVATED LACTIC ACID LEVEL: ICD-10-CM

## 2018-10-12 LAB
ALBUMIN SERPL-MCNC: 3.9 G/DL (ref 3.5–5)
ALBUMIN/GLOB SERPL: 0.8 {RATIO} (ref 1.1–2.2)
ALP SERPL-CCNC: 149 U/L (ref 45–117)
ALT SERPL-CCNC: 46 U/L (ref 12–78)
ANION GAP SERPL CALC-SCNC: 14 MMOL/L (ref 5–15)
AST SERPL-CCNC: 56 U/L (ref 15–37)
BASOPHILS # BLD: 0.1 K/UL (ref 0–0.1)
BASOPHILS NFR BLD: 1 % (ref 0–1)
BILIRUB SERPL-MCNC: 0.4 MG/DL (ref 0.2–1)
BNP SERPL-MCNC: 100 PG/ML (ref 0–125)
BUN SERPL-MCNC: 17 MG/DL (ref 6–20)
BUN/CREAT SERPL: 17 (ref 12–20)
CALCIUM SERPL-MCNC: 9.2 MG/DL (ref 8.5–10.1)
CHLORIDE SERPL-SCNC: 106 MMOL/L (ref 97–108)
CO2 SERPL-SCNC: 18 MMOL/L (ref 21–32)
COMMENT, HOLDF: NORMAL
CREAT SERPL-MCNC: 1 MG/DL (ref 0.55–1.02)
DIFFERENTIAL METHOD BLD: ABNORMAL
EOSINOPHIL # BLD: 0.1 K/UL (ref 0–0.4)
EOSINOPHIL NFR BLD: 1 % (ref 0–7)
ERYTHROCYTE [DISTWIDTH] IN BLOOD BY AUTOMATED COUNT: 13.1 % (ref 11.5–14.5)
EST. AVERAGE GLUCOSE BLD GHB EST-MCNC: 120 MG/DL
FLUAV AG NPH QL IA: NEGATIVE
FLUBV AG NOSE QL IA: NEGATIVE
GLOBULIN SER CALC-MCNC: 4.8 G/DL (ref 2–4)
GLUCOSE SERPL-MCNC: 103 MG/DL (ref 65–100)
HBA1C MFR BLD: 5.8 % (ref 4.2–6.3)
HCT VFR BLD AUTO: 41.6 % (ref 35–47)
HGB BLD-MCNC: 13.4 G/DL (ref 11.5–16)
IMM GRANULOCYTES # BLD: 0 K/UL (ref 0–0.04)
IMM GRANULOCYTES NFR BLD AUTO: 1 % (ref 0–0.5)
LACTATE BLD-SCNC: 3.6 MMOL/L (ref 0.4–2)
LACTATE SERPL-SCNC: 1.6 MMOL/L (ref 0.4–2)
LYMPHOCYTES # BLD: 4.3 K/UL (ref 0.8–3.5)
LYMPHOCYTES NFR BLD: 50 % (ref 12–49)
MCH RBC QN AUTO: 29.6 PG (ref 26–34)
MCHC RBC AUTO-ENTMCNC: 32.2 G/DL (ref 30–36.5)
MCV RBC AUTO: 92 FL (ref 80–99)
MONOCYTES # BLD: 0.6 K/UL (ref 0–1)
MONOCYTES NFR BLD: 7 % (ref 5–13)
NEUTS SEG # BLD: 3.5 K/UL (ref 1.8–8)
NEUTS SEG NFR BLD: 40 % (ref 32–75)
NRBC # BLD: 0 K/UL (ref 0–0.01)
NRBC BLD-RTO: 0 PER 100 WBC
PLATELET # BLD AUTO: 304 K/UL (ref 150–400)
PMV BLD AUTO: 11.2 FL (ref 8.9–12.9)
POTASSIUM SERPL-SCNC: 5.1 MMOL/L (ref 3.5–5.1)
POTASSIUM SERPL-SCNC: 5.7 MMOL/L (ref 3.5–5.1)
PROT SERPL-MCNC: 8.7 G/DL (ref 6.4–8.2)
RBC # BLD AUTO: 4.52 M/UL (ref 3.8–5.2)
SAMPLES BEING HELD,HOLD: NORMAL
SODIUM SERPL-SCNC: 138 MMOL/L (ref 136–145)
TROPONIN I SERPL-MCNC: <0.05 NG/ML
WBC # BLD AUTO: 8.6 K/UL (ref 3.6–11)

## 2018-10-12 PROCEDURE — 74011250637 HC RX REV CODE- 250/637: Performed by: INTERNAL MEDICINE

## 2018-10-12 PROCEDURE — 74011000258 HC RX REV CODE- 258: Performed by: EMERGENCY MEDICINE

## 2018-10-12 PROCEDURE — 85025 COMPLETE CBC W/AUTO DIFF WBC: CPT | Performed by: EMERGENCY MEDICINE

## 2018-10-12 PROCEDURE — 96365 THER/PROPH/DIAG IV INF INIT: CPT

## 2018-10-12 PROCEDURE — 96361 HYDRATE IV INFUSION ADD-ON: CPT

## 2018-10-12 PROCEDURE — 80053 COMPREHEN METABOLIC PANEL: CPT | Performed by: EMERGENCY MEDICINE

## 2018-10-12 PROCEDURE — 94640 AIRWAY INHALATION TREATMENT: CPT

## 2018-10-12 PROCEDURE — 83605 ASSAY OF LACTIC ACID: CPT

## 2018-10-12 PROCEDURE — 94760 N-INVAS EAR/PLS OXIMETRY 1: CPT

## 2018-10-12 PROCEDURE — 74011636320 HC RX REV CODE- 636/320: Performed by: EMERGENCY MEDICINE

## 2018-10-12 PROCEDURE — 84145 PROCALCITONIN (PCT): CPT | Performed by: INTERNAL MEDICINE

## 2018-10-12 PROCEDURE — 93005 ELECTROCARDIOGRAM TRACING: CPT

## 2018-10-12 PROCEDURE — 99285 EMERGENCY DEPT VISIT HI MDM: CPT

## 2018-10-12 PROCEDURE — 83605 ASSAY OF LACTIC ACID: CPT | Performed by: EMERGENCY MEDICINE

## 2018-10-12 PROCEDURE — 87040 BLOOD CULTURE FOR BACTERIA: CPT | Performed by: EMERGENCY MEDICINE

## 2018-10-12 PROCEDURE — 87804 INFLUENZA ASSAY W/OPTIC: CPT | Performed by: INTERNAL MEDICINE

## 2018-10-12 PROCEDURE — 65270000029 HC RM PRIVATE

## 2018-10-12 PROCEDURE — 71045 X-RAY EXAM CHEST 1 VIEW: CPT

## 2018-10-12 PROCEDURE — 74011250636 HC RX REV CODE- 250/636: Performed by: EMERGENCY MEDICINE

## 2018-10-12 PROCEDURE — 94664 DEMO&/EVAL PT USE INHALER: CPT

## 2018-10-12 PROCEDURE — 71275 CT ANGIOGRAPHY CHEST: CPT

## 2018-10-12 PROCEDURE — 77030029684 HC NEB SM VOL KT MONA -A

## 2018-10-12 PROCEDURE — 36415 COLL VENOUS BLD VENIPUNCTURE: CPT | Performed by: INTERNAL MEDICINE

## 2018-10-12 PROCEDURE — 83880 ASSAY OF NATRIURETIC PEPTIDE: CPT | Performed by: EMERGENCY MEDICINE

## 2018-10-12 PROCEDURE — 84132 ASSAY OF SERUM POTASSIUM: CPT | Performed by: EMERGENCY MEDICINE

## 2018-10-12 PROCEDURE — 83036 HEMOGLOBIN GLYCOSYLATED A1C: CPT | Performed by: INTERNAL MEDICINE

## 2018-10-12 PROCEDURE — 74011000250 HC RX REV CODE- 250: Performed by: EMERGENCY MEDICINE

## 2018-10-12 PROCEDURE — 84484 ASSAY OF TROPONIN QUANT: CPT | Performed by: EMERGENCY MEDICINE

## 2018-10-12 PROCEDURE — 74011000250 HC RX REV CODE- 250: Performed by: INTERNAL MEDICINE

## 2018-10-12 RX ORDER — HYDRALAZINE HYDROCHLORIDE 20 MG/ML
20 INJECTION INTRAMUSCULAR; INTRAVENOUS
Status: DISCONTINUED | OUTPATIENT
Start: 2018-10-12 | End: 2018-10-14 | Stop reason: HOSPADM

## 2018-10-12 RX ORDER — HYDROCODONE BITARTRATE AND ACETAMINOPHEN 5; 325 MG/1; MG/1
1 TABLET ORAL
Status: DISCONTINUED | OUTPATIENT
Start: 2018-10-12 | End: 2018-10-14 | Stop reason: HOSPADM

## 2018-10-12 RX ORDER — BISACODYL 5 MG
5 TABLET, DELAYED RELEASE (ENTERIC COATED) ORAL DAILY PRN
Status: DISCONTINUED | OUTPATIENT
Start: 2018-10-12 | End: 2018-10-14 | Stop reason: HOSPADM

## 2018-10-12 RX ORDER — SODIUM CHLORIDE 0.9 % (FLUSH) 0.9 %
5-10 SYRINGE (ML) INJECTION AS NEEDED
Status: DISCONTINUED | OUTPATIENT
Start: 2018-10-12 | End: 2018-10-14 | Stop reason: HOSPADM

## 2018-10-12 RX ORDER — OLOPATADINE HYDROCHLORIDE 7 MG/ML
1 SOLUTION OPHTHALMIC DAILY
COMMUNITY
End: 2020-01-17

## 2018-10-12 RX ORDER — LEVOFLOXACIN 5 MG/ML
750 INJECTION, SOLUTION INTRAVENOUS EVERY 24 HOURS
Status: DISCONTINUED | OUTPATIENT
Start: 2018-10-13 | End: 2018-10-14 | Stop reason: HOSPADM

## 2018-10-12 RX ORDER — ENOXAPARIN SODIUM 100 MG/ML
40 INJECTION SUBCUTANEOUS EVERY 24 HOURS
Status: DISCONTINUED | OUTPATIENT
Start: 2018-10-12 | End: 2018-10-14 | Stop reason: HOSPADM

## 2018-10-12 RX ORDER — ARFORMOTEROL TARTRATE 15 UG/2ML
15 SOLUTION RESPIRATORY (INHALATION)
Status: DISCONTINUED | OUTPATIENT
Start: 2018-10-12 | End: 2018-10-14 | Stop reason: HOSPADM

## 2018-10-12 RX ORDER — LEVOFLOXACIN 5 MG/ML
750 INJECTION, SOLUTION INTRAVENOUS
Status: COMPLETED | OUTPATIENT
Start: 2018-10-12 | End: 2018-10-12

## 2018-10-12 RX ORDER — SODIUM CHLORIDE 0.9 % (FLUSH) 0.9 %
5-10 SYRINGE (ML) INJECTION EVERY 8 HOURS
Status: DISCONTINUED | OUTPATIENT
Start: 2018-10-12 | End: 2018-10-14 | Stop reason: HOSPADM

## 2018-10-12 RX ORDER — BUDESONIDE 0.5 MG/2ML
500 INHALANT ORAL
Status: DISCONTINUED | OUTPATIENT
Start: 2018-10-12 | End: 2018-10-14 | Stop reason: HOSPADM

## 2018-10-12 RX ORDER — ACETAMINOPHEN 325 MG/1
650 TABLET ORAL
Status: DISCONTINUED | OUTPATIENT
Start: 2018-10-12 | End: 2018-10-14 | Stop reason: HOSPADM

## 2018-10-12 RX ORDER — SODIUM CHLORIDE 0.9 % (FLUSH) 0.9 %
10 SYRINGE (ML) INJECTION
Status: COMPLETED | OUTPATIENT
Start: 2018-10-12 | End: 2018-10-12

## 2018-10-12 RX ORDER — THERA TABS 400 MCG
1 TAB ORAL DAILY
COMMUNITY

## 2018-10-12 RX ORDER — GUAIFENESIN 600 MG/1
600 TABLET, EXTENDED RELEASE ORAL EVERY 12 HOURS
Status: DISCONTINUED | OUTPATIENT
Start: 2018-10-12 | End: 2018-10-14 | Stop reason: HOSPADM

## 2018-10-12 RX ORDER — ALBUTEROL SULFATE 0.83 MG/ML
5 SOLUTION RESPIRATORY (INHALATION)
Status: DISCONTINUED | OUTPATIENT
Start: 2018-10-12 | End: 2018-10-14 | Stop reason: HOSPADM

## 2018-10-12 RX ADMIN — Medication 10 ML: at 10:42

## 2018-10-12 RX ADMIN — ALBUTEROL SULFATE 5 MG: 2.5 SOLUTION RESPIRATORY (INHALATION) at 19:36

## 2018-10-12 RX ADMIN — ALBUTEROL SULFATE 1 DOSE: 2.5 SOLUTION RESPIRATORY (INHALATION) at 08:20

## 2018-10-12 RX ADMIN — GUAIFENESIN 600 MG: 600 TABLET, EXTENDED RELEASE ORAL at 21:56

## 2018-10-12 RX ADMIN — BUDESONIDE 500 MCG: 0.5 INHALANT RESPIRATORY (INHALATION) at 19:37

## 2018-10-12 RX ADMIN — ALBUTEROL SULFATE 5 MG: 2.5 SOLUTION RESPIRATORY (INHALATION) at 17:46

## 2018-10-12 RX ADMIN — ALBUTEROL SULFATE 5 MG: 2.5 SOLUTION RESPIRATORY (INHALATION) at 23:10

## 2018-10-12 RX ADMIN — SODIUM CHLORIDE 1000 ML: 900 INJECTION, SOLUTION INTRAVENOUS at 09:13

## 2018-10-12 RX ADMIN — Medication 10 ML: at 21:56

## 2018-10-12 RX ADMIN — SODIUM CHLORIDE 100 ML: 900 INJECTION, SOLUTION INTRAVENOUS at 10:42

## 2018-10-12 RX ADMIN — SODIUM CHLORIDE 1000 ML: 900 INJECTION, SOLUTION INTRAVENOUS at 08:18

## 2018-10-12 RX ADMIN — IOPAMIDOL 100 ML: 755 INJECTION, SOLUTION INTRAVENOUS at 10:42

## 2018-10-12 RX ADMIN — ALBUTEROL SULFATE 1 DOSE: 2.5 SOLUTION RESPIRATORY (INHALATION) at 11:57

## 2018-10-12 RX ADMIN — LEVOFLOXACIN 750 MG: 5 INJECTION, SOLUTION INTRAVENOUS at 11:34

## 2018-10-12 NOTE — PROGRESS NOTES
Admission Medication Reconciliation:    Information obtained from:  patient, RxQuery    Comments/Recommendations:     Spoke with patient regarding patient's medications. She states that the only medication she uses on a daily basis is her Pazeo eye drop. The following changes were made to the PTA medication list:  1. Removed fluticasone, pantoprazole, Miralax, psyllium  2. Added Pazeo, MVI  3. Adjusted ranitidine BID PRN --> daily PRN    Allergies and reactions were verified with the patient. The patient states she had an allergy to penicllins ~25 years ago and would like this allergy to be removed from our system. I have removed the penicillin allergy per the patient's request.    Patient's pharmacy: [facility] on [address] [place] [address]       Allergies:  Pcn [penicillins]    Chief Complaint for this Admission:    Chief Complaint   Patient presents with    Chest Pain    Shortness of Breath       Significant PMH/Disease States:   Past Medical History:   Diagnosis Date    Asthma 6/30/2013    Dyslipidemia     Obesity 6/9/2014    JENNIFER on CPAP        Prior to Admission Medications:   Prior to Admission Medications   Prescriptions Last Dose Informant Patient Reported? Taking? albuterol (PROVENTIL HFA, VENTOLIN HFA) 90 mcg/actuation inhaler   No No   Sig: Take 2 Puffs by inhalation every six (6) hours as needed for Wheezing. olopatadine (PAZEO) 0.7 % drop 10/11/2018 at Unknown time  Yes Yes   Sig: Administer 1 Drop to both eyes daily. raNITIdine (ZANTAC) 150 mg tablet   Yes No   Sig: Take 150 mg by mouth daily as needed. Indications: gastroesophageal reflux disease   therapeutic multivitamin (THERAGRAN) tablet   Yes Yes   Sig: Take 1 Tab by mouth daily. Facility-Administered Medications: None       Thank you for allowing me to participate in this patient's care. Please call the main pharmacy at  or the [facility] pharmacist at  with any questions.     Farhana Trinidad, CjD

## 2018-10-12 NOTE — IP AVS SNAPSHOT
[address] 
[phone] Patient: Akash Romano MRN: [id number] :1965 A check rojelio indicates which time of day the medication should be taken. My Medications START taking these medications Instructions Each Dose to Equal  
 Morning Noon Evening Bedtime  
 amLODIPine 5 mg tablet Commonly known as:  Taina Honey Start taking on:  10/15/2018 Your last dose was: Your next dose is: Take 1 Tab by mouth daily. 5 mg  
    
   
   
   
  
 guaiFENesin-dextromethorphan 100-10 mg/5 mL syrup Commonly known as:  ROBITUSSIN DM Your last dose was: Your next dose is: Take 10 mL by mouth every six (6) hours as needed for up to 10 days. Indications: Cough 10 mL  
    
   
   
   
  
 levoFLOXacin 750 mg tablet Commonly known as:  Henderson Given Start taking on:  10/15/2018 Your last dose was: Your next dose is: Take 1 Tab by mouth daily. 750 mg  
    
   
   
   
  
 predniSONE 20 mg tablet Commonly known as:  Chiquita Peper Start taking on:  10/15/2018 Your last dose was: Your next dose is: Take 2 Tabs by mouth daily (with breakfast). 40 mg CONTINUE taking these medications Instructions Each Dose to Equal  
 Morning Noon Evening Bedtime  
 albuterol 90 mcg/actuation inhaler Commonly known as:  PROVENTIL HFA, VENTOLIN HFA, PROAIR HFA Your last dose was: Your next dose is: Take 2 Puffs by inhalation every six (6) hours as needed for Wheezing. 2 Puff PAZEO 0.7 % Drop Generic drug:  olopatadine Your last dose was: Your next dose is:    
   
   
 Administer 1 Drop to both eyes daily. 1 Drop  
    
   
   
   
  
 therapeutic multivitamin tablet Commonly known as:  [facility]  
   
 Your last dose was: Your next dose is: Take 1 Tab by mouth daily. 1 Tab ZANTAC 150 mg tablet Generic drug:  raNITIdine Your last dose was: Your next dose is: Take 150 mg by mouth daily as needed. Indications: gastroesophageal reflux disease 150 mg Where to Get Your Medications These medications were sent to [facility] [address], [address] Phone:  [phone]  
  amLODIPine 5 mg tablet  
 guaiFENesin-dextromethorphan 100-10 mg/5 mL syrup  
 levoFLOXacin 750 mg tablet  
 predniSONE 20 mg tablet

## 2018-10-12 NOTE — ED PROVIDER NOTES
HPI       48y F with hx of asthma here with shortness of breath and chest pain. Onset was 4 days ago. Saw her PMD who prescribed azithromycin and albuterol. Pt got up this AM to go back to work but felt like she was having too much shortness of breath and pain in the chest. Sx's are exertional. Has noticed swelling in both feet but no other leg pain/swelling. No recent travel. Pt does have a hx of asthma but has never had chest pain associated with her breathing problems. (+) nausea. No vomiting. No diaphoresis. No fever. No reports of orthopnea. Past Medical History:   Diagnosis Date    Asthma 2013    Dyslipidemia     Obesity 2014    JENNIFER on CPAP        Past Surgical History:   Procedure Laterality Date    DELIVERY   46    DELIVERY   12    ENDOSCOPY, COLON, DIAGNOSTIC      HX CHOLECYSTECTOMY      HX HYSTERECTOMY      complete, endometriosis. Family History:   Problem Relation Age of Onset    Other Mother      CAD   Kyus[employer]Bitter Hypertension Mother     Hypertension Father     Kidney Disease Father      End Stage Renal, Dialysis    Hypertension Brother     Diabetes Paternal Uncle     Other Maternal Grandmother      CAD    Heart Attack Maternal Grandmother     Cancer Maternal Grandfather      Throat Cancer    Dementia Paternal Grandmother      Alzheimer's Dementia    Cancer Paternal Grandfather      Colon Cancer    Diabetes Paternal Uncle        Social History     Social History    Marital status:      Spouse name: N/A    Number of children: N/A    Years of education: N/A     Occupational History    Not on file.      Social History Main Topics    Smoking status: Never Smoker    Smokeless tobacco: Never Used    Alcohol use No    Drug use: No    Sexual activity: Yes     Partners: Male     Birth control/ protection: Surgical     Other Topics Concern    Not on file     Social History Narrative         ALLERGIES: Pcn [penicillins]    Review of Systems   Review of Systems   Constitutional: (-) weight loss. HEENT: (-) stiff neck   Eyes: (-) discharge. Respiratory: (+) cough. Cardiovascular: (-) syncope. Gastrointestinal: (-) blood in stool. Genitourinary: (-) hematuria. Musculoskeletal: (-) myalgias. Neurological: (-) seizure. Skin: (-) petechiae  Lymph/Immunologic: (-) enlarged lymph nodes  All other systems reviewed and are negative. Vitals:    10/12/18 0743   BP: (!) 188/112   Pulse: (!) 138   Resp: 29   Temp: 99.5 °F (37.5 °C)   SpO2: 100%   Weight: 126.1 kg (278 lb)   Height: 5' (1.524 m)            Physical Exam Nursing note and vitals reviewed. Constitutional: oriented to person, place, and time. appears well-developed and obese. No distress. Head: Normocephalic and atraumatic. Sclera anicteric  Nose: No rhinorrhea  Mouth/Throat: Oropharynx is clear and moist. Pharynx normal  Eyes: Conjunctivae are normal. Pupils are equal, round, and reactive to light. Right eye exhibits no discharge. Left eye exhibits no discharge. Neck: Painless normal range of motion. Neck supple. No LAD. Cardiovascular: fast rate, regular rhythm, normal heart sounds and intact distal pulses. Exam reveals no gallop and no friction rub. No murmur heard. Pulmonary/Chest:  Mild to moderate respiratory distress. Coarse breath sounds throughout. No wheezes. No rales. No rhonchi. (+) increased work of breathing. No accessory muscle use. Good air exchange throughout. Abdominal: soft, non-tender, no rebound or guarding. No hepatosplenomegaly. Normal bowel sounds throughout. Back: no tenderness to palpation, no deformities, no CVA tenderness  Extremities/Musculoskeletal: Normal range of motion. no tenderness. No edema. Distal extremities are neurovasc intact. Lymphadenopathy:   No adenopathy. Neurological:  Alert and oriented to person, place, and time. Coordination normal. CN 2-12 intact. Motor and sensory function intact. Skin: Skin is warm and dry. No rash noted. No pallor. MDM 48y F here with chest pain and difficulty breathing. Will need labs, ECG, CXR and likely CTA chest. She is tachy on arrival but has variability. Last albuterol use was several hours ago. Will see if fluids help any. ED Course       Procedures    ED EKG interpretation:  Rhythm: sinus tachycardia; and regular . Rate (approx.): 139; Axis: normal; P wave: normal; QRS interval: normal ; ST/T wave: normal;  This EKG was interpreted by Jose Thompson MD,ED Provider. 8:02 AM  HR now down in the 110's without any intervention. Will try a duoneb to see if it helps with breathing. 11:00 AM  Some improvement with nebs. CT shows pneumonia - worse compared to prior imaging. Already on outpatient therapy, but not working. Will admit for IV abx and ongoing respiratory support.     Total critical care time (excluding procedures): 35 min

## 2018-10-12 NOTE — ED TRIAGE NOTES
Pt arrives via EMS c/o CP, SOB. Recently dx with bronchitis and prescribed a z-pack. Today was pt's first day back to work since dx of bronchitis. Pt given nitro and 4 baby ASA en route.

## 2018-10-12 NOTE — ROUTINE PROCESS
TRANSFER - OUT REPORT:    Verbal report given to Sahara Liu RN(name) on Jina Yousif  being transferred to [facility](unit) for routine progression of care       Report consisted of patients Situation, Background, Assessment and   Recommendations(SBAR). Information from the following report(s) SBAR was reviewed with the receiving nurse. Lines:   Peripheral IV 10/12/18 Right Antecubital (Active)   Site Assessment Clean, dry, & intact 10/12/2018  8:15 AM   Phlebitis Assessment 0 10/12/2018  8:15 AM   Infiltration Assessment 0 10/12/2018  8:15 AM   Dressing Status Clean, dry, & intact 10/12/2018  8:15 AM   Dressing Type Transparent 10/12/2018  8:15 AM   Hub Color/Line Status Blue;Flushed;Patent 10/12/2018  8:15 AM   Action Taken Blood drawn 10/12/2018  8:15 AM       Peripheral IV 10/12/18 Left Antecubital (Active)   Site Assessment Clean, dry, & intact 10/12/2018  9:53 AM   Phlebitis Assessment 0 10/12/2018  9:53 AM   Infiltration Assessment 0 10/12/2018  9:53 AM   Dressing Status Clean, dry, & intact 10/12/2018  9:53 AM       Peripheral IV 10/12/18 Left Antecubital (Active)   Site Assessment Clean, dry, & intact 10/12/2018 10:13 AM   Phlebitis Assessment 0 10/12/2018 10:13 AM   Infiltration Assessment 0 10/12/2018 10:13 AM   Dressing Type Transparent 10/12/2018 10:13 AM   Hub Color/Line Status Pink;Flushed;Patent 10/12/2018 10:13 AM   Action Taken Blood drawn 10/12/2018 10:13 AM        Opportunity for questions and clarification was provided.       Patient transported with:  Transportation

## 2018-10-12 NOTE — H&P
History and Physical  Primary Care Provider: Jacqui Mosher MD    CC: shortness of breath     Subjective:     50year old female with past medical history diet controlled hypertension, DM Type II, presenting to [facility] with worsening shortness of breath and cough for 1.5 weeks. Patient was recently evaluated by PCP and diagnosed with bronchitis. She received z-pack, tylenol with codeine, and inhaler. Patient continued to get worse despite taking medications. She was at work today and told by her co-workers to come to hospital. She has noticed her shortness of breath worsening over the past couple days, with limited ability to walk, and having to lay down. She has had associated cough, chest pain, sweats, lightheadedness. In the ED, , /112, lactic acid 3.6, K 5.1. Chest x-ray concerning for pneumonia. Subsequent CT chest demonstrated multifocal pneumonia. Hospitalist medicine asked to admit. Review of Systems:  A comprehensive review of systems was negative except for that written in the History of Present Illness. Past Medical History:   Diagnosis Date    Asthma 2013    Diabetes ([facility] [address]     Dyslipidemia     Hypertension     Obesity 2014    JENNIFER on CPAP       Past Surgical History:   Procedure Laterality Date    DELIVERY   46    DELIVERY   12    ENDOSCOPY, COLON, DIAGNOSTIC      HX CHOLECYSTECTOMY      HX HYSTERECTOMY      complete, endometriosis. Prior to Admission medications    Medication Sig Start Date End Date Taking? Authorizing Provider   olopatadine (PAZEO) 0.7 % drop Administer 1 Drop to both eyes daily. Yes Historical Provider   therapeutic multivitamin (THERAGRAN) tablet Take 1 Tab by mouth daily. Yes Historical Provider   raNITIdine (ZANTAC) 150 mg tablet Take 150 mg by mouth daily as needed.  Indications: gastroesophageal reflux disease    Historical Provider   albuterol (PROVENTIL HFA, VENTOLIN HFA) 90 mcg/actuation inhaler Take 2 Puffs by inhalation every six (6) hours as needed for Wheezing. 3/1/14   Marv العلي MD     No Known Allergies   Family History   Problem Relation Age of Onset    Other Mother      CAD   [facility] Hypertension Mother     Hypertension Father     Kidney Disease Father      End Stage Renal, Dialysis    Hypertension Brother     Diabetes Paternal Uncle     Other Maternal Grandmother      CAD    Heart Attack Maternal Grandmother     Cancer Maternal Grandfather      Throat Cancer    Dementia Paternal Grandmother      Alzheimer's Dementia    Cancer Paternal Grandfather      Colon Cancer    Diabetes Paternal Uncle         SOCIAL HISTORY:  Patient resides at Home. Patient ambulates with independence. Smoking history: never  Alcohol history: none    Objective:       Physical Exam:   Visit Vitals    /83    Pulse (!) 111    Temp 97.9 °F (36.6 °C)    Resp 20    Ht 5' (1.524 m)    Wt 126.1 kg (278 lb)    SpO2 98%    BMI 54.29 kg/m2     General appearance: alert, cooperative, no distress, appears stated age  Throat: Lips, mucosa, and tongue normal. Teeth and gums normal  Neck: supple, symmetrical, trachea midline, no adenopathy, thyroid: not enlarged, symmetric, no tenderness/mass/nodules, no carotid bruit and no JVD  Lungs: clear to auscultation bilaterally  Heart: regular rate and rhythm, S1, S2 normal, no murmur, click, rub or gallop  Abdomen: soft, non-tender. Bowel sounds normal. No masses,  no organomegaly  Extremities: extremities normal, atraumatic, no cyanosis or edema  Pulses: 2+ and symmetric  Skin: Skin color, texture, turgor normal. No rashes or lesions  Neurologic: Grossly normal    ECG:  normal EKG, normal sinus rhythm, unchanged from previous tracings, sinus tachycardia     Data Review: All diagnostic labs and studies have been reviewed. Chest x-ray IMPRESSION:  1.  Mild residual opacities in left mid lower lung zone are present previously  and appear to have slightly decreased. These have not yet resolved. This could  reflect residual or recurrent pneumonia. Clinical correlation is suggested. CT  scan of chest may yield more information    Assessment:     Active Problems:    Multifocal pneumonia (10/12/2018)        Plan:     Severe sepsis with lactic acidosis secondary to multifocal pneumonia:  -failed outpatient azithromycin  -Admit to inpatient.    -s/p Levaquin in the ED, will continue  -check urine legionella, strep pneumo  -duonebs, pulmicort/brovana   -procalcitonin   -blood cultures pending  -s/p 1 liters bolus, give additional liter   -repeat lactic acid within normal limits     History of DM, Type II, diet controlled  -Check Ha1c    History of hypertension  -as needed hydralazine    Asthma  -duonebs     DVT: lovenox  Code: full           Signed By: Sussy Lundberg DO     October 12, 2018

## 2018-10-12 NOTE — IP AVS SNAPSHOT
[address] 
[phone] Patient: Onesimo Andino MRN: [id number] :1965 About your hospitalization You were admitted on:  2018 You last received care in the:  [facility] 2N MED SURG You were discharged on:  2018 Why you were hospitalized Your primary diagnosis was:  Multifocal Pneumonia Your diagnoses also included:  Asthma With Exacerbation, Hypertension, Severe Sepsis (Hcc) Follow-up Information Follow up With Details Comments Contact Info Dax Richard MD   [address] [place] [address] 
[phone] Discharge Orders None A check rojelio indicates which time of day the medication should be taken. My Medications START taking these medications Instructions Each Dose to Equal  
 Morning Noon Evening Bedtime  
 amLODIPine 5 mg tablet Commonly known as:  Esmond Hof Start taking on:  10/15/2018 Your last dose was: Your next dose is: Take 1 Tab by mouth daily. 5 mg  
    
   
   
   
  
 guaiFENesin-dextromethorphan 100-10 mg/5 mL syrup Commonly known as:  ROBITUSSIN DM Your last dose was: Your next dose is: Take 10 mL by mouth every six (6) hours as needed for up to 10 days. Indications: Cough 10 mL  
    
   
   
   
  
 levoFLOXacin 750 mg tablet Commonly known as:  Jerl Grills Start taking on:  10/15/2018 Your last dose was: Your next dose is: Take 1 Tab by mouth daily. 750 mg  
    
   
   
   
  
 predniSONE 20 mg tablet Commonly known as:  Rayne Rota Start taking on:  10/15/2018 Your last dose was: Your next dose is: Take 2 Tabs by mouth daily (with breakfast). 40 mg CONTINUE taking these medications Instructions Each Dose to Equal  
 Morning Noon Evening Bedtime albuterol 90 mcg/actuation inhaler Commonly known as:  PROVENTIL HFA, VENTOLIN HFA, PROAIR HFA Your last dose was: Your next dose is: Take 2 Puffs by inhalation every six (6) hours as needed for Wheezing. 2 Puff PAZEO 0.7 % Drop Generic drug:  olopatadine Your last dose was: Your next dose is:    
   
   
 Administer 1 Drop to both eyes daily. 1 Drop  
    
   
   
   
  
 therapeutic multivitamin tablet Commonly known as:  [facility] Your last dose was: Your next dose is: Take 1 Tab by mouth daily. 1 Tab ZANTAC 150 mg tablet Generic drug:  raNITIdine Your last dose was: Your next dose is: Take 150 mg by mouth daily as needed. Indications: gastroesophageal reflux disease 150 mg Where to Get Your Medications These medications were sent to [address], [address] [place] [address] Phone:  [phone]  
  amLODIPine 5 mg tablet  
 guaiFENesin-dextromethorphan 100-10 mg/5 mL syrup  
 levoFLOXacin 750 mg tablet  
 predniSONE 20 mg tablet Discharge Instructions Discharge Instructions PATIENT ID: Mendez Duran MRN: [id number] YOB: 1965 DATE OF ADMISSION: 10/12/2018  7:37 AM   
DATE OF DISCHARGE: 10/14/2018 PRIMARY CARE PROVIDER: Charlie Gonsales MD  
 
ATTENDING PHYSICIAN: Evelia Gonsales DO 
DISCHARGING PROVIDER: Evelia Gonsales DO To contact this individual call [phone] and ask the  to page. If unavailable ask to be transferred the Adult Hospitalist Department. DISCHARGE DIAGNOSES Pneumonia Asthma exacerbation CONSULTATIONS: None PROCEDURES/SURGERIES: * No surgery found * PENDING TEST RESULTS:  
 At the time of discharge the following test results are still pending: none FOLLOW UP APPOINTMENTS:  
Follow-up Information Follow up With Details Comments Contact Info Marge Stephens MD   [address] [place] [address] 
[phone] ADDITIONAL CARE RECOMMENDATIONS:  
Prescriptions sent to pharmacy: 
-Levaquin and prednisone: begin tomorrow, Monday, 10/15 and finish entire prescriptions 
-Cough medicine as needed 
-Amlodipine 5 mg once a day for blood pressure Schedule a follow up visit this week with your primary care physician. Please have them complete pulmonary function tests to evaluate possible asthma  
-Return to work as tolerated DIET: Regular Diet ACTIVITY: Activity as tolerated WOUND CARE: none EQUIPMENT needed: none DISCHARGE MEDICATIONS: 
 See Medication Reconciliation Form · It is important that you take the medication exactly as they are prescribed. · Keep your medication in the bottles provided by the pharmacist and keep a list of the medication names, dosages, and times to be taken in your wallet. · Do not take other medications without consulting your doctor. NOTIFY YOUR PHYSICIAN FOR ANY OF THE FOLLOWING:  
Fever over 101 degrees for 24 hours. Chest pain, shortness of breath, fever, chills, nausea, vomiting, diarrhea, change in mentation, falling, weakness, bleeding. Severe pain or pain not relieved by medications. Or, any other signs or symptoms that you may have questions about. DISPOSITION: 
x Home With: 
 OT  PT  [facility]  RN  
  
 SNF/Inpatient Rehab/LTAC Independent/assisted living Hospice Other: CDMP Checked:  
Yes x PROBLEM LIST Updated: 
Yes x Signed:  
Olya Darnell DO 
10/14/2018 10:11 AM 
 
  
  
  
[employer] Announcement We are excited to announce that we are making your provider's discharge notes available to you in [employer].   You will see these notes when they are completed and signed by the physician that discharged you from your recent hospital stay. If you have any questions or concerns about any information you see in Jybe, please call the Health Information Department where you were seen or reach out to your Primary Care Provider for more information about your plan of care. Introducing [facility]! [employer] introduces Jybe patient portal. Now you can access parts of your medical record, email your doctor's office, and request medication refills online. 1. In your internet browser, go to [email or web address] 2. Click on the First Time User? Click Here link in the Sign In box. You will see the New Member Sign Up page. 3. Enter your Jybe Access Code exactly as it appears below. You will not need to use this code after youve completed the sign-up process. If you do not sign up before the expiration date, you must request a new code. · Jybe Access Code: [email or web address] Expires: 12/29/2018  5:22 AM 
 
4. Enter the last four digits of your Social Security Number (xxxx) and Date of Birth (mm/dd/yyyy) as indicated and click Submit. You will be taken to the next sign-up page. 5. Create a Jybe ID. This will be your Jybe login ID and cannot be changed, so think of one that is secure and easy to remember. 6. Create a Jybe password. You can change your password at any time. 7. Enter your Password Reset Question and Answer. This can be used at a later time if you forget your password. 8. Enter your e-mail address. You will receive e-mail notification when new information is available in [address] 9. Click Sign Up. You can now view and download portions of your medical record. 10. Click the Download Summary menu link to download a portable copy of your medical information.  
 
If you have questions, please visit the Frequently Asked Questions section of the [employer]. Remember, MyChart is NOT to be used for urgent needs. For medical emergencies, dial 911. Now available from your iPhone and Android! Introducing Anuj Ware As a [facility] patient, I wanted to make you aware of our electronic visit tool called Anuj Ware. [employer] allows you to connect within minutes with a medical provider 24 hours a day, seven days a week via a mobile device or tablet or logging into a secure website from your computer. You can access Anuj Ware from anywhere in the [place]. A virtual visit might be right for you when you have a simple condition and feel like you just dont want to get out of bed, or cant get away from work for an appointment, when your regular [facility] provider is not available (evenings, weekends or holidays), or when youre out of town and need minor care. Electronic visits cost only $49 and if the [facility] provider determines a prescription is needed to treat your condition, one can be electronically transmitted to a nearby pharmacy*. Please take a moment to enroll today if you have not already done so. The enrollment process is free and takes just a few minutes. To enroll, please download the Divide ed to your tablet or phone, or visit [email or web address] to enroll on your computer. And, as an [address] [place] [address] patient with a [employer] account, the results of your visits will be scanned into your electronic medical record and your primary care provider will be able to view the scanned results. We urge you to continue to see your regular [facility] provider for your ongoing medical care. And while your primary care provider may not be the one available when you seek a Anuj Ware virtual visit, the peace of mind you get from getting a real diagnosis real time can be priceless. For more information on Anuj Leaevafin, view our Frequently Asked Questions (FAQs) at [email or web address]. Sincerely, 
 
Win Chadwick MD 
Chief Medical Officer Liza Gray *:  certain medications cannot be prescribed via Anuj Venucain Unresulted Labs-Please follow up with your PCP about these lab tests Order Current Status LEGIONELLA PNEUMOPHILA AG, URINE In process PROCALCITONIN In process S. PNEUMO AG, UR/CSF In process CULTURE, BLOOD, PAIRED Preliminary result Providers Seen During Your Hospitalization Provider Specialty Primary office phone Sharron Dietz MD Emergency Medicine [phone] Mary Ellen Jarrell DO Hospitalist [phone] Immunizations Administered for This Admission Name Date Influenza Vaccine (Quad) PF 10/13/2018 Your Primary Care Physician (PCP) Primary Care Physician Office Phone Office Fax Jakub Lugo [phone] You are allergic to the following No active allergies Recent Documentation Height Weight BMI OB Status Smoking Status 1.524 m 126.1 kg 54.29 kg/m2 Hysterectomy Never Smoker Emergency Contacts Name Discharge Info Relation Home Work Mobile [facility] DISCHARGE CAREGIVER [3] Spouse [3] [phone] Yvan Montanez DISCHARGE CAREGIVER [3] Brother [24] [phone] Patient Belongings The following personal items are in your possession at time of discharge: 
     Visual Aid: Contacts, With patient                  Other Valuables: At bedside Please provide this summary of care documentation to your next provider. Signatures-by signing, you are acknowledging that this After Visit Summary has been reviewed with you and you have received a copy. Patient Signature:  ____________________________________________________________ Date:  ____________________________________________________________  
  
Waleska Hammersmith Provider Signature:  ____________________________________________________________ Date:  ____________________________________________________________

## 2018-10-13 LAB
ATRIAL RATE: 139 BPM
CALCULATED P AXIS, ECG09: 40 DEGREES
CALCULATED R AXIS, ECG10: -11 DEGREES
CALCULATED T AXIS, ECG11: 4 DEGREES
DIAGNOSIS, 93000: NORMAL
P-R INTERVAL, ECG05: 130 MS
Q-T INTERVAL, ECG07: 290 MS
QRS DURATION, ECG06: 70 MS
QTC CALCULATION (BEZET), ECG08: 441 MS
VENTRICULAR RATE, ECG03: 139 BPM

## 2018-10-13 PROCEDURE — 74011250636 HC RX REV CODE- 250/636: Performed by: INTERNAL MEDICINE

## 2018-10-13 PROCEDURE — 87449 NOS EACH ORGANISM AG IA: CPT | Performed by: INTERNAL MEDICINE

## 2018-10-13 PROCEDURE — 74011000250 HC RX REV CODE- 250: Performed by: INTERNAL MEDICINE

## 2018-10-13 PROCEDURE — 94640 AIRWAY INHALATION TREATMENT: CPT

## 2018-10-13 PROCEDURE — 87899 AGENT NOS ASSAY W/OPTIC: CPT | Performed by: INTERNAL MEDICINE

## 2018-10-13 PROCEDURE — 90686 IIV4 VACC NO PRSV 0.5 ML IM: CPT | Performed by: INTERNAL MEDICINE

## 2018-10-13 PROCEDURE — 90471 IMMUNIZATION ADMIN: CPT

## 2018-10-13 PROCEDURE — 77010033678 HC OXYGEN DAILY

## 2018-10-13 PROCEDURE — 65270000029 HC RM PRIVATE

## 2018-10-13 PROCEDURE — 77030027138 HC INCENT SPIROMETER -A

## 2018-10-13 PROCEDURE — 74011250637 HC RX REV CODE- 250/637: Performed by: INTERNAL MEDICINE

## 2018-10-13 RX ORDER — AMLODIPINE BESYLATE 5 MG/1
5 TABLET ORAL DAILY
Status: DISCONTINUED | OUTPATIENT
Start: 2018-10-13 | End: 2018-10-14 | Stop reason: HOSPADM

## 2018-10-13 RX ORDER — CARVEDILOL 6.25 MG/1
6.25 TABLET ORAL 2 TIMES DAILY WITH MEALS
Status: DISCONTINUED | OUTPATIENT
Start: 2018-10-13 | End: 2018-10-14 | Stop reason: HOSPADM

## 2018-10-13 RX ORDER — GUAIFENESIN/DEXTROMETHORPHAN 100-10MG/5
10 SYRUP ORAL
Status: DISCONTINUED | OUTPATIENT
Start: 2018-10-13 | End: 2018-10-14 | Stop reason: HOSPADM

## 2018-10-13 RX ADMIN — ARFORMOTEROL TARTRATE 15 MCG: 15 SOLUTION RESPIRATORY (INHALATION) at 21:14

## 2018-10-13 RX ADMIN — Medication 10 ML: at 05:57

## 2018-10-13 RX ADMIN — SODIUM CHLORIDE 1000 ML: 900 INJECTION, SOLUTION INTRAVENOUS at 08:47

## 2018-10-13 RX ADMIN — ENOXAPARIN SODIUM 40 MG: 40 INJECTION SUBCUTANEOUS at 13:39

## 2018-10-13 RX ADMIN — ALBUTEROL SULFATE 5 MG: 2.5 SOLUTION RESPIRATORY (INHALATION) at 03:52

## 2018-10-13 RX ADMIN — ALBUTEROL SULFATE 5 MG: 2.5 SOLUTION RESPIRATORY (INHALATION) at 11:20

## 2018-10-13 RX ADMIN — INFLUENZA VIRUS VACCINE 0.5 ML: 15; 15; 15; 15 SUSPENSION INTRAMUSCULAR at 13:39

## 2018-10-13 RX ADMIN — CARVEDILOL 6.25 MG: 6.25 TABLET, FILM COATED ORAL at 13:38

## 2018-10-13 RX ADMIN — BUDESONIDE 500 MCG: 0.5 INHALANT RESPIRATORY (INHALATION) at 11:23

## 2018-10-13 RX ADMIN — ARFORMOTEROL TARTRATE 15 MCG: 15 SOLUTION RESPIRATORY (INHALATION) at 11:23

## 2018-10-13 RX ADMIN — BUDESONIDE 500 MCG: 0.5 INHALANT RESPIRATORY (INHALATION) at 21:14

## 2018-10-13 RX ADMIN — HYDROCODONE BITARTRATE AND ACETAMINOPHEN 1 TABLET: 5; 325 TABLET ORAL at 22:16

## 2018-10-13 RX ADMIN — LEVOFLOXACIN 750 MG: 5 INJECTION, SOLUTION INTRAVENOUS at 11:07

## 2018-10-13 RX ADMIN — Medication 10 ML: at 22:00

## 2018-10-13 RX ADMIN — AMLODIPINE BESYLATE 5 MG: 5 TABLET ORAL at 08:46

## 2018-10-13 RX ADMIN — GUAIFENESIN AND DEXTROMETHORPHAN 10 ML: 100; 10 SYRUP ORAL at 17:14

## 2018-10-13 RX ADMIN — GUAIFENESIN 600 MG: 600 TABLET, EXTENDED RELEASE ORAL at 22:00

## 2018-10-13 RX ADMIN — GUAIFENESIN 600 MG: 600 TABLET, EXTENDED RELEASE ORAL at 08:46

## 2018-10-13 NOTE — PROGRESS NOTES
-                                                   -           Hospitalist Progress Note  Alma Mcdonald DO  Answering service: [phone] OR [phone] from in house phone        Date of Service:  10/13/2018  NAME:  Jamar Salazar YOB: 1965  MRN:  [id number]      Admission Summary:   50year old female with past medical history diet controlled hypertension, DM Type II, presenting to [facility] with worsening shortness of breath and cough. CT scan demonstrated multifocal pneumonia. Interval history / Subjective:   Patient seen and examined. Patient states overall she feels much better than admission. Still has shortness of breath with exertion. Breathing treatments helped significantly. Assessment & Plan:     Severe sepsis with lactic acidosis secondary to multifocal pneumonia:  -failed outpatient azithromycin  -now on levaquin  -check urine legionella, strep pneumo pending   -magalis, pulmicort/brovana   -blood cultures negative  -additional liter fluids this morning     Tachycardia:  -review of outside records show patient has tachycardia 100-110 at baseline  -add carvedilol  -s/p fluids     History of DM, Type II, diet controlled  -Ha1c 5.8     Hypertension  -amlodipine, carvedilol      Asthma  -magalis     Code status: full   DVT prophylaxis: lovenox    Care Plan discussed with: Patient/Family  Disposition: TBD. Possible d/c 10/14     Hospital Problems  Date Reviewed: 2015          Codes Class Noted POA    Multifocal pneumonia ICD-10-CM: J18.9  ICD-9-CM: 486  10/12/2018 Unknown                Review of Systems:   Negative unless stated above      Vital Signs:    Last 24hrs VS reviewed since prior progress note.  Most recent are:  Visit Vitals    BP (!) 145/91 (BP 1 Location: Right arm, BP Patient Position: At rest)    Pulse (!) 108    Temp 99.2 °F (37.3 °C)    Resp 17    Ht 5' (1.524 m)    Wt 126.1 kg (278 lb)    SpO2 92%    BMI 54.29 kg/m2       No intake or output data in the 24 hours ending 10/13/18 1641     Physical Examination:             Constitutional:  No acute distress, cooperative, pleasant, obese female   ENT:  Oral mucous moist, oropharynx benign. Neck supple,    Resp:  clear bilaterally. No wheezing/rhonchi/rales. No accessory muscle use   CV:  tachycardic normal rate, no murmurs, gallops, rubs    GI:  Soft, non distended, non tender. normoactive bowel sounds, no hepatosplenomegaly     Musculoskeletal:  No edema, warm, 2+ pulses throughout    Neurologic:  Moves all extremities. AAOx3           Data Review:    Review and/or order of clinical lab test    CTA chest:  IMPRESSION:      Very limited examination due to patient's body habitus.     No gross evidence of pulmonary embolism.     Persistent soft tissue density in the right tracheoesophageal groove region may  represent caudal extension of thyroid.     Extensive pulmonary consolidation in the left upper lobe, left lower lobe and to  a lesser extent in the right middle and lower lobes. However, these are chronic  findings described on previous studies from August 9, 2018 and July 18, 2016. There appears to be slight interval worsening consolidation particularly in the  left lower lobe since the most recent previous study and overall progression  since prior study from July 18, 2016.     Incidental moderate hiatal hernia. Labs:     Recent Labs      10/12/18   0803   WBC  8.6   HGB  13.4   HCT  41.6   PLT  304     Recent Labs      10/12/18   0803   NA  138   K  5.1  5.7*   CL  106   CO2  18*   BUN  17   CREA  1.00   GLU  103*   CA  9.2     Recent Labs      10/12/18   0803   SGOT  56*   ALT  46   AP  149*   TBILI  0.4   TP  8.7*   ALB  3.9   GLOB  4.8*     No results for input(s): INR, PTP, APTT in the last 72 hours. No lab exists for component: INREXT   No results for input(s): FE, TIBC, PSAT, FERR in the last 72 hours.    No results found for: FOL, RBCF   No results for input(s): PH, PCO2, PO2 in the last 72 hours.   Recent Labs      10/12/18   0803   TROIQ  <0.05     Lab Results   Component Value Date/Time    Cholesterol, total 175 06/17/2014 02:44 PM    HDL Cholesterol 41 06/17/2014 02:44 PM    LDL, calculated 102 (H) 06/17/2014 02:44 PM    Triglyceride 159 (H) 06/17/2014 02:44 PM    CHOL/HDL Ratio 4.5 03/13/2009 10:40 AM     Lab Results   Component Value Date/Time    Glucose (POC) 82 05/28/2015 02:35 PM    Glucose (POC) 106 (H) 12/15/2014 09:59 PM     No results found for: COLOR, APPRN, SPGRU, REFSG, VICKI, PROTU, GLUCU, KETU, BILU, UROU, CLAIRE, LEUKU, GLUKE, EPSU, BACTU, WBCU, RBCU, CASTS, UCRY      Medications Reviewed:     Current Facility-Administered Medications   Medication Dose Route Frequency    amLODIPine (NORVASC) tablet 5 mg  5 mg Oral DAILY    carvedilol (COREG) tablet 6.25 mg  6.25 mg Oral BID WITH MEALS    sodium chloride (NS) flush 5-10 mL  5-10 mL IntraVENous Q8H    sodium chloride (NS) flush 5-10 mL  5-10 mL IntraVENous PRN    levoFLOXacin (LEVAQUIN) 750 mg in D5W IVPB  750 mg IntraVENous Q24H    acetaminophen (TYLENOL) tablet 650 mg  650 mg Oral Q4H PRN    HYDROcodone-acetaminophen (NORCO) 5-325 mg per tablet 1 Tab  1 Tab Oral Q4H PRN    albuterol (PROVENTIL VENTOLIN) nebulizer solution 5 mg  5 mg Nebulization Q4H RT    bisacodyl (DULCOLAX) tablet 5 mg  5 mg Oral DAILY PRN    enoxaparin (LOVENOX) injection 40 mg  40 mg SubCUTAneous Q24H    arformoterol (BROVANA) neb solution 15 mcg  15 mcg Nebulization BID RT    budesonide (PULMICORT) 500 mcg/2 ml nebulizer suspension  500 mcg Nebulization BID RT    guaiFENesin ER (MUCINEX) tablet 600 mg  600 mg Oral Q12H    hydrALAZINE (APRESOLINE) 20 mg/mL injection 20 mg  20 mg IntraVENous Q6H PRN     ______________________________________________________________________  EXPECTED LENGTH OF STAY: - - -  ACTUAL LENGTH OF STAY:          [address] [facility],

## 2018-10-13 NOTE — PROGRESS NOTES
Problem: Falls - Risk of  Goal: *Absence of Falls  Document Ruchi Fall Risk and appropriate interventions in the flowsheet.    Outcome: Progressing Towards Goal  Fall Risk Interventions:            Medication Interventions: Evaluate medications/consider consulting pharmacy, Teach patient to arise slowly

## 2018-10-13 NOTE — PROGRESS NOTES
Notified MD patient with dry hacking cough, MD ordered cough syrup and start incentive spirometer. 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

## 2018-10-13 NOTE — PROGRESS NOTES
Bedside shift change report given to [address] (oncoming nurse) by Malena Cruz (offgoing nurse). Report included the following information SBAR, Kardex, MAR and Recent Results.

## 2018-10-13 NOTE — ROUTINE PROCESS
Bedside shift change report given to [address] [place] [address] (oncoming nurse) by Kerwinnce Fili RN (offgoing nurse). Report included the following information SBAR, Kardex, MAR and Recent Results.

## 2018-10-14 VITALS
TEMPERATURE: 98.6 F | HEIGHT: 60 IN | RESPIRATION RATE: 16 BRPM | DIASTOLIC BLOOD PRESSURE: 78 MMHG | OXYGEN SATURATION: 97 % | SYSTOLIC BLOOD PRESSURE: 130 MMHG | WEIGHT: 278 LBS | HEART RATE: 103 BPM | BODY MASS INDEX: 54.58 KG/M2

## 2018-10-14 PROBLEM — I10 HYPERTENSION: Status: ACTIVE | Noted: 2018-10-14

## 2018-10-14 PROBLEM — A41.9 SEVERE SEPSIS (HCC): Status: ACTIVE | Noted: 2018-10-14

## 2018-10-14 PROBLEM — J45.901 ASTHMA WITH EXACERBATION: Status: ACTIVE | Noted: 2018-10-14

## 2018-10-14 PROBLEM — R65.20 SEVERE SEPSIS (HCC): Status: ACTIVE | Noted: 2018-10-14

## 2018-10-14 LAB
ANION GAP SERPL CALC-SCNC: 7 MMOL/L (ref 5–15)
BUN SERPL-MCNC: 15 MG/DL (ref 6–20)
BUN/CREAT SERPL: 18 (ref 12–20)
CALCIUM SERPL-MCNC: 9 MG/DL (ref 8.5–10.1)
CHLORIDE SERPL-SCNC: 105 MMOL/L (ref 97–108)
CO2 SERPL-SCNC: 24 MMOL/L (ref 21–32)
CREAT SERPL-MCNC: 0.82 MG/DL (ref 0.55–1.02)
GLUCOSE SERPL-MCNC: 95 MG/DL (ref 65–100)
L PNEUMO1 AG UR QL IA: NEGATIVE
POTASSIUM SERPL-SCNC: 4.7 MMOL/L (ref 3.5–5.1)
SODIUM SERPL-SCNC: 136 MMOL/L (ref 136–145)
SPECIMEN SOURCE: NORMAL

## 2018-10-14 PROCEDURE — 74011000250 HC RX REV CODE- 250: Performed by: INTERNAL MEDICINE

## 2018-10-14 PROCEDURE — 74011250636 HC RX REV CODE- 250/636: Performed by: INTERNAL MEDICINE

## 2018-10-14 PROCEDURE — 36415 COLL VENOUS BLD VENIPUNCTURE: CPT | Performed by: INTERNAL MEDICINE

## 2018-10-14 PROCEDURE — 94640 AIRWAY INHALATION TREATMENT: CPT

## 2018-10-14 PROCEDURE — 80048 BASIC METABOLIC PNL TOTAL CA: CPT | Performed by: INTERNAL MEDICINE

## 2018-10-14 PROCEDURE — 74011250637 HC RX REV CODE- 250/637: Performed by: INTERNAL MEDICINE

## 2018-10-14 RX ORDER — GUAIFENESIN/DEXTROMETHORPHAN 100-10MG/5
10 SYRUP ORAL
Qty: 100 ML | Refills: 0 | Status: SHIPPED | OUTPATIENT
Start: 2018-10-14 | End: 2018-10-24

## 2018-10-14 RX ORDER — PREDNISONE 20 MG/1
40 TABLET ORAL
Qty: 8 TAB | Refills: 0 | Status: SHIPPED | OUTPATIENT
Start: 2018-10-15 | End: 2019-03-19

## 2018-10-14 RX ORDER — AMLODIPINE BESYLATE 5 MG/1
5 TABLET ORAL DAILY
Qty: 30 TAB | Refills: 0 | Status: SHIPPED | OUTPATIENT
Start: 2018-10-15 | End: 2020-01-17

## 2018-10-14 RX ORDER — LEVOFLOXACIN 750 MG/1
750 TABLET ORAL DAILY
Qty: 2 TAB | Refills: 0 | Status: SHIPPED | OUTPATIENT
Start: 2018-10-15 | End: 2019-02-12 | Stop reason: ALTCHOICE

## 2018-10-14 RX ADMIN — ALBUTEROL SULFATE 5 MG: 2.5 SOLUTION RESPIRATORY (INHALATION) at 01:01

## 2018-10-14 RX ADMIN — Medication 10 ML: at 08:57

## 2018-10-14 RX ADMIN — ALBUTEROL SULFATE 5 MG: 2.5 SOLUTION RESPIRATORY (INHALATION) at 07:15

## 2018-10-14 RX ADMIN — GUAIFENESIN 600 MG: 600 TABLET, EXTENDED RELEASE ORAL at 08:57

## 2018-10-14 RX ADMIN — BUDESONIDE 500 MCG: 0.5 INHALANT RESPIRATORY (INHALATION) at 07:15

## 2018-10-14 RX ADMIN — METHYLPREDNISOLONE SODIUM SUCCINATE 40 MG: 40 INJECTION, POWDER, FOR SOLUTION INTRAMUSCULAR; INTRAVENOUS at 08:57

## 2018-10-14 RX ADMIN — ALBUTEROL SULFATE 5 MG: 2.5 SOLUTION RESPIRATORY (INHALATION) at 11:15

## 2018-10-14 RX ADMIN — CARVEDILOL 6.25 MG: 6.25 TABLET, FILM COATED ORAL at 08:06

## 2018-10-14 RX ADMIN — AMLODIPINE BESYLATE 5 MG: 5 TABLET ORAL at 08:56

## 2018-10-14 RX ADMIN — Medication 10 ML: at 08:00

## 2018-10-14 RX ADMIN — LEVOFLOXACIN 750 MG: 5 INJECTION, SOLUTION INTRAVENOUS at 11:03

## 2018-10-14 RX ADMIN — Medication 10 ML: at 08:07

## 2018-10-14 NOTE — DISCHARGE INSTRUCTIONS
Discharge Instructions       PATIENT ID: Rehan Artis  MRN: [id number]   YOB: 1965    DATE OF ADMISSION: 10/12/2018  7:37 AM    DATE OF DISCHARGE: 10/14/2018    PRIMARY CARE PROVIDER: Sabrina Lucio MD     ATTENDING PHYSICIAN: Meme Rose DO  DISCHARGING PROVIDER: Meme Rose DO    To contact this individual call [phone] and ask the  to page. If unavailable ask to be transferred the Adult Hospitalist Department. DISCHARGE DIAGNOSES     Pneumonia   Asthma exacerbation    CONSULTATIONS: None    PROCEDURES/SURGERIES: * No surgery found *    PENDING TEST RESULTS:   At the time of discharge the following test results are still pending: none    FOLLOW UP APPOINTMENTS:   Follow-up Information     Follow up With Details Comments [address] [facility], [address] [place] [address],First Floor  [phone]             ADDITIONAL CARE RECOMMENDATIONS:   Prescriptions sent to pharmacy:  -Levaquin and prednisone: begin tomorrow, Monday, 10/15 and finish entire prescriptions  -Cough medicine as needed  -Amlodipine 5 mg once a day for blood pressure  Schedule a follow up visit this week with your primary care physician. Please have them complete pulmonary function tests to evaluate possible asthma   -Return to work as tolerated     DIET: Regular Diet    ACTIVITY: Activity as tolerated    WOUND CARE: none    EQUIPMENT needed: none    DISCHARGE MEDICATIONS:   See Medication Reconciliation Form    · It is important that you take the medication exactly as they are prescribed. · Keep your medication in the bottles provided by the pharmacist and keep a list of the medication names, dosages, and times to be taken in your wallet. · Do not take other medications without consulting your doctor. NOTIFY YOUR PHYSICIAN FOR ANY OF THE FOLLOWING:   Fever over 101 degrees for 24 hours.    Chest pain, shortness of breath, fever, chills, nausea, vomiting, diarrhea, change in mentation, falling, weakness, bleeding. Severe pain or pain not relieved by medications. Or, any other signs or symptoms that you may have questions about.       DISPOSITION:  x Home With:   OT  PT  HH  RN       SNF/Inpatient Rehab/LTAC    Independent/assisted living    Hospice    Other:     CDMP Checked:   Yes x     PROBLEM LIST Updated:  Yes x       Signed:   Karina Ferrara DO  10/14/2018  10:11 AM

## 2018-10-14 NOTE — PROGRESS NOTES
Problem: Falls - Risk of  Goal: *Absence of Falls  Document Ruchi Fall Risk and appropriate interventions in the flowsheet.    Outcome: Resolved/Met Date Met: 10/14/18  Fall Risk Interventions:            Medication Interventions: Evaluate medications/consider consulting pharmacy

## 2018-10-14 NOTE — PROGRESS NOTES
Bedside and Verbal shift change report given to [place] (oncoming nurse) by Brad Saravia (offgoing nurse). Report included the following information SBAR, Kardex and MAR.

## 2018-10-14 NOTE — DISCHARGE SUMMARY
Discharge Summary       PATIENT ID: Jennifer Schuster  MRN: [id number]   YOB: 1965    DATE OF ADMISSION: 10/12/2018  7:37 AM    DATE OF DISCHARGE: 10/14/2018  PRIMARY CARE PROVIDER: Sahil Butcher MD     ATTENDING PHYSICIAN: Roger Ramesh DO   DISCHARGING PROVIDER: Roger Ramesh DO    To contact this individual call [phone] and ask the  to page. If unavailable ask to be transferred the Adult Hospitalist Department. CONSULTATIONS: None    PROCEDURES/SURGERIES: * No surgery found *    ADMITTING DIAGNOSES & HOSPITAL COURSE:   50year old female with past medical history diet controlled hypertension, DM Type II, presenting to [facility] with worsening shortness of breath and cough for 1.5 weeks. Patient was recently evaluated by PCP and diagnosed with bronchitis. She received z-pack, tylenol with codeine, and inhaler. Patient continued to get worse despite taking medications. She was at work today and told by her co-workers to come to hospital. She has noticed her shortness of breath worsening over the past couple days, with limited ability to walk, and having to lay down. She has had associated cough, chest pain, sweats, lightheadedness. In the ED, , /112, lactic acid 3.6, K 5.1. Chest x-ray concerning for pneumonia. Subsequent CT chest demonstrated multifocal pneumonia. Hospitalist medicine asked to admit. Patient started on antibiotics, breathing treatments, cough suppressant, steroids. She continued to improve and stable for discharge. She was instructed to follow up with her primary care provider for hospital follow up, blood pressure check and pulmonary functions tests.          DISCHARGE DIAGNOSES / PLAN:      Severe sepsis with lactic acidosis secondary to multifocal pneumonia  -failed outpatient azithromycin  -levaquin x 5 days total     Asthma with exacerbation  -duonebs, prednisone x 5 days total  -outpatient PFTs, ?benefit from maintenance inhaler       Tachycardia  -review of outside records show patient has tachycardia 100-110 at baseline  -s/p fluids with improvement   -consider Beta-blocker outpatient if persists       History of DM, Type II, diet controlled  -Ha1c 5.8      Hypertension  -amlodipine           PENDING TEST RESULTS:   At the time of discharge the following test results are still pending: none    FOLLOW UP APPOINTMENTS:    Follow-up Information     Follow up With Details Comments Contact Info    Sahil Butcher MD   [address]  [phone]             ADDITIONAL CARE RECOMMENDATIONS:   Prescriptions sent to pharmacy:  -Levaquin and prednisone: begin tomorrow, Monday, 10/15 and finish entire prescriptions  -Cough medicine as needed  -Amlodipine 5 mg once a day for blood pressure  Schedule a follow up visit this week with your primary care physician. Please have them complete pulmonary function tests to evaluate possible asthma   -Return to work as tolerated     DIET: Regular Diet    ACTIVITY: Activity as tolerated    WOUND CARE: none    EQUIPMENT needed: none      DISCHARGE MEDICATIONS:  Current Discharge Medication List      START taking these medications    Details   amLODIPine (NORVASC) 5 mg tablet Take 1 Tab by mouth daily. Qty: 30 Tab, Refills: 0      guaiFENesin-dextromethorphan (ROBITUSSIN DM) 100-10 mg/5 mL syrup Take 10 mL by mouth every six (6) hours as needed for up to 10 days. Indications: Cough  Qty: 100 mL, Refills: 0      levoFLOXacin (LEVAQUIN) 750 mg tablet Take 1 Tab by mouth daily. Qty: 2 Tab, Refills: 0      predniSONE (DELTASONE) 20 mg tablet Take 2 Tabs by mouth daily (with breakfast). Qty: 8 Tab, Refills: 0         CONTINUE these medications which have NOT CHANGED    Details   olopatadine (PAZEO) 0.7 % drop Administer 1 Drop to both eyes daily. therapeutic multivitamin (THERAGRAN) tablet Take 1 Tab by mouth daily.       raNITIdine (ZANTAC) 150 mg tablet Take 150 mg by mouth daily as needed. Indications: gastroesophageal reflux disease      albuterol (PROVENTIL HFA, VENTOLIN HFA) 90 mcg/actuation inhaler Take 2 Puffs by inhalation every six (6) hours as needed for Wheezing. Qty: 1 Inhaler, Refills: 1    Associated Diagnoses: Asthma               NOTIFY YOUR PHYSICIAN FOR ANY OF THE FOLLOWING:   Fever over 101 degrees for 24 hours. Chest pain, shortness of breath, fever, chills, nausea, vomiting, diarrhea, change in mentation, falling, weakness, bleeding. Severe pain or pain not relieved by medications. Or, any other signs or symptoms that you may have questions about. DISPOSITION:  x  Home With:   OT  PT  HH  RN       Long term SNF/Inpatient Rehab    Independent/assisted living    Hospice    Other:       PATIENT CONDITION AT DISCHARGE:     Functional status    Poor     Deconditioned    x Independent      Cognition   x  Lucid     Forgetful     Dementia      Catheters/lines (plus indication)    [place]     PICC     PEG    x None      Code status   x  Full code     DNR      PHYSICAL EXAMINATION AT DISCHARGE:  Constitutional:  No acute distress, cooperative, pleasant, obese female   ENT:  Oral mucous moist, oropharynx benign. Neck supple,    Resp: diminished breath sounds with bibasilar crackles No accessory muscle use   CV:  tachycardic normal rate, no murmurs, gallops, rubs    GI:  Soft, non distended, non tender. normoactive bowel sounds, no hepatosplenomegaly     Musculoskeletal:  No edema, warm, 2+ pulses throughout    Neurologic:  Moves all extremities.   AAOx3           CHRONIC MEDICAL DIAGNOSES:  Problem List as of 10/14/2018  Date Reviewed: 5/29/2015          Codes Class Noted - Resolved    Severe sepsis ([facility] ICD-10-CM: A41.9, R65.20  ICD-9-CM: 038.9, 995.92  10/14/2018 - Present        Asthma with exacerbation ICD-10-CM: J45.901  ICD-9-CM: 493.92  10/14/2018 - Present        Hypertension ICD-10-CM: I10  ICD-9-CM: 401.9  10/14/2018 - Present        * (Principal)Multifocal pneumonia ICD-10-CM: J18.9  ICD-9-CM: 445  10/12/2018 - Present        Diastolic murmur GFL-52-ZB: I38  ICD-9-CM: 785.2  6/9/2014 - Present        Obesity ICD-10-CM: E66.9  ICD-9-CM: 278.00  6/9/2014 - Present        Asthma ICD-10-CM: J45.909  ICD-9-CM: 493.90  6/30/2013 - Present              Greater than 30 minutes were spent with the patient on counseling and coordination of care    Signed:   Roger Ramesh DO  10/14/2018  10:14 AM

## 2018-10-14 NOTE — ROUTINE PROCESS
Bedside shift change report given to [address] [place] [address] (oncoming nurse) by Martínez Hope RN (offgoing nurse). Report included the following information SBAR, Kardex, MAR and Recent Results.

## 2018-10-15 LAB
FLUID CULTURE, SPNG2: NORMAL
ORGANISM ID, SPNG3: NORMAL
PLEASE NOTE, SPNG4: NORMAL
PROCALCITONIN SERPL-MCNC: <0.02 NG/ML (ref 0–0.08)
S PNEUM AG SPEC QL LA: NEGATIVE
SPECIMEN SOURCE: NORMAL
SPECIMEN, SPNG1: NORMAL

## 2018-10-17 LAB
BACTERIA SPEC CULT: NORMAL
SERVICE CMNT-IMP: NORMAL

## 2018-11-20 ENCOUNTER — HOSPITAL ENCOUNTER (OUTPATIENT)
Dept: CT IMAGING | Age: 53
Discharge: HOME | End: 2018-11-20
Attending: INTERNAL MEDICINE
Payer: COMMERCIAL

## 2018-11-20 ENCOUNTER — HOSPITAL ENCOUNTER (OUTPATIENT)
Dept: NON INVASIVE DIAGNOSTICS | Age: 53
Discharge: HOME | End: 2018-11-20
Attending: INTERNAL MEDICINE
Payer: COMMERCIAL

## 2018-11-20 DIAGNOSIS — R93.89 ABNORMAL CHEST CT: ICD-10-CM

## 2018-11-20 PROCEDURE — 74011250636 HC RX REV CODE- 250/636: Performed by: INTERNAL MEDICINE

## 2018-11-20 PROCEDURE — 93306 TTE W/DOPPLER COMPLETE: CPT

## 2018-11-20 PROCEDURE — 71250 CT THORAX DX C-: CPT

## 2018-11-20 RX ADMIN — PERFLUTREN 2 ML: 6.52 INJECTION, SUSPENSION INTRAVENOUS at 10:39

## 2018-12-05 ENCOUNTER — ANESTHESIA EVENT (OUTPATIENT)
Dept: ENDOSCOPY | Age: 53
End: 2018-12-05
Payer: COMMERCIAL

## 2018-12-05 ENCOUNTER — ANESTHESIA (OUTPATIENT)
Dept: ENDOSCOPY | Age: 53
End: 2018-12-05
Payer: COMMERCIAL

## 2018-12-05 ENCOUNTER — APPOINTMENT (OUTPATIENT)
Dept: CT IMAGING | Age: 53
End: 2018-12-05
Attending: INTERNAL MEDICINE
Payer: COMMERCIAL

## 2018-12-05 ENCOUNTER — HOSPITAL ENCOUNTER (OUTPATIENT)
Age: 53
Setting detail: OUTPATIENT SURGERY
Discharge: HOME | End: 2018-12-05
Attending: INTERNAL MEDICINE | Admitting: INTERNAL MEDICINE
Payer: COMMERCIAL

## 2018-12-05 VITALS
SYSTOLIC BLOOD PRESSURE: 176 MMHG | BODY MASS INDEX: 54.58 KG/M2 | WEIGHT: 278 LBS | TEMPERATURE: 97.8 F | RESPIRATION RATE: 20 BRPM | OXYGEN SATURATION: 98 % | HEIGHT: 60 IN | HEART RATE: 101 BPM | DIASTOLIC BLOOD PRESSURE: 89 MMHG

## 2018-12-05 DIAGNOSIS — R93.89 ABNORMAL CHEST CT: ICD-10-CM

## 2018-12-05 LAB
APPEARANCE FLD: CLEAR
COLOR FLD: COLORLESS
COMMENT, HOLDF: NORMAL
KOH PREP SPEC: NORMAL
LYMPHOCYTES NFR FLD: 39 %
MONOS+MACROS NFR FLD: 55 %
NEUTROPHILS NFR FLD: 1 %
NUC CELL # FLD: 14 /CU MM
OTHER CELL,FOTHC: 5 %
RBC # FLD: 21 /CU MM
SAMPLES BEING HELD,HOLD: NORMAL
SERVICE CMNT-IMP: NORMAL
SPECIMEN SOURCE FLD: ABNORMAL

## 2018-12-05 PROCEDURE — 87102 FUNGUS ISOLATION CULTURE: CPT

## 2018-12-05 PROCEDURE — 74011250636 HC RX REV CODE- 250/636: Performed by: INTERNAL MEDICINE

## 2018-12-05 PROCEDURE — 87186 SC STD MICRODIL/AGAR DIL: CPT

## 2018-12-05 PROCEDURE — 88112 CYTOPATH CELL ENHANCE TECH: CPT

## 2018-12-05 PROCEDURE — 87070 CULTURE OTHR SPECIMN AEROBIC: CPT

## 2018-12-05 PROCEDURE — 77030008684 HC TU ET CUF COVD -B: Performed by: ANESTHESIOLOGY

## 2018-12-05 PROCEDURE — 89050 BODY FLUID CELL COUNT: CPT

## 2018-12-05 PROCEDURE — 76040000020: Performed by: INTERNAL MEDICINE

## 2018-12-05 PROCEDURE — 74011000250 HC RX REV CODE- 250

## 2018-12-05 PROCEDURE — 88172 CYTP DX EVAL FNA 1ST EA SITE: CPT

## 2018-12-05 PROCEDURE — 74011250636 HC RX REV CODE- 250/636

## 2018-12-05 PROCEDURE — 88173 CYTOPATH EVAL FNA REPORT: CPT

## 2018-12-05 PROCEDURE — 87077 CULTURE AEROBIC IDENTIFY: CPT

## 2018-12-05 PROCEDURE — 87205 SMEAR GRAM STAIN: CPT

## 2018-12-05 PROCEDURE — 71250 CT THORAX DX C-: CPT

## 2018-12-05 PROCEDURE — 87210 SMEAR WET MOUNT SALINE/INK: CPT

## 2018-12-05 PROCEDURE — 88305 TISSUE EXAM BY PATHOLOGIST: CPT

## 2018-12-05 PROCEDURE — 77030026438 HC STYL ET INTUB CARD -A: Performed by: ANESTHESIOLOGY

## 2018-12-05 PROCEDURE — 76040000019: Performed by: INTERNAL MEDICINE

## 2018-12-05 PROCEDURE — 87116 MYCOBACTERIA CULTURE: CPT

## 2018-12-05 RX ORDER — MIDAZOLAM HYDROCHLORIDE 1 MG/ML
1 INJECTION, SOLUTION INTRAMUSCULAR; INTRAVENOUS AS NEEDED
Status: DISCONTINUED | OUTPATIENT
Start: 2018-12-05 | End: 2018-12-05 | Stop reason: HOSPADM

## 2018-12-05 RX ORDER — MORPHINE SULFATE 10 MG/ML
2 INJECTION, SOLUTION INTRAMUSCULAR; INTRAVENOUS
Status: DISCONTINUED | OUTPATIENT
Start: 2018-12-05 | End: 2018-12-05 | Stop reason: HOSPADM

## 2018-12-05 RX ORDER — MIDAZOLAM HYDROCHLORIDE 1 MG/ML
0.5 INJECTION, SOLUTION INTRAMUSCULAR; INTRAVENOUS
Status: DISCONTINUED | OUTPATIENT
Start: 2018-12-05 | End: 2018-12-05 | Stop reason: HOSPADM

## 2018-12-05 RX ORDER — KETOROLAC TROMETHAMINE 30 MG/ML
15 INJECTION, SOLUTION INTRAMUSCULAR; INTRAVENOUS
Status: COMPLETED | OUTPATIENT
Start: 2018-12-05 | End: 2018-12-05

## 2018-12-05 RX ORDER — NEOSTIGMINE METHYLSULFATE 1 MG/ML
INJECTION INTRAVENOUS AS NEEDED
Status: DISCONTINUED | OUTPATIENT
Start: 2018-12-05 | End: 2018-12-05 | Stop reason: HOSPADM

## 2018-12-05 RX ORDER — FENTANYL CITRATE 50 UG/ML
25 INJECTION, SOLUTION INTRAMUSCULAR; INTRAVENOUS
Status: DISCONTINUED | OUTPATIENT
Start: 2018-12-05 | End: 2018-12-05 | Stop reason: HOSPADM

## 2018-12-05 RX ORDER — SODIUM CHLORIDE, SODIUM LACTATE, POTASSIUM CHLORIDE, CALCIUM CHLORIDE 600; 310; 30; 20 MG/100ML; MG/100ML; MG/100ML; MG/100ML
25 INJECTION, SOLUTION INTRAVENOUS CONTINUOUS
Status: DISCONTINUED | OUTPATIENT
Start: 2018-12-05 | End: 2018-12-05 | Stop reason: HOSPADM

## 2018-12-05 RX ORDER — SODIUM CHLORIDE 0.9 % (FLUSH) 0.9 %
5-10 SYRINGE (ML) INJECTION AS NEEDED
Status: DISCONTINUED | OUTPATIENT
Start: 2018-12-05 | End: 2018-12-05 | Stop reason: HOSPADM

## 2018-12-05 RX ORDER — ONDANSETRON 2 MG/ML
4 INJECTION INTRAMUSCULAR; INTRAVENOUS AS NEEDED
Status: DISCONTINUED | OUTPATIENT
Start: 2018-12-05 | End: 2018-12-05 | Stop reason: HOSPADM

## 2018-12-05 RX ORDER — SODIUM CHLORIDE, SODIUM LACTATE, POTASSIUM CHLORIDE, CALCIUM CHLORIDE 600; 310; 30; 20 MG/100ML; MG/100ML; MG/100ML; MG/100ML
125 INJECTION, SOLUTION INTRAVENOUS CONTINUOUS
Status: DISCONTINUED | OUTPATIENT
Start: 2018-12-05 | End: 2018-12-05 | Stop reason: HOSPADM

## 2018-12-05 RX ORDER — ONDANSETRON 2 MG/ML
INJECTION INTRAMUSCULAR; INTRAVENOUS AS NEEDED
Status: DISCONTINUED | OUTPATIENT
Start: 2018-12-05 | End: 2018-12-05 | Stop reason: HOSPADM

## 2018-12-05 RX ORDER — SUCCINYLCHOLINE CHLORIDE 20 MG/ML
INJECTION INTRAMUSCULAR; INTRAVENOUS AS NEEDED
Status: DISCONTINUED | OUTPATIENT
Start: 2018-12-05 | End: 2018-12-05 | Stop reason: HOSPADM

## 2018-12-05 RX ORDER — FENTANYL CITRATE 50 UG/ML
50 INJECTION, SOLUTION INTRAMUSCULAR; INTRAVENOUS AS NEEDED
Status: DISCONTINUED | OUTPATIENT
Start: 2018-12-05 | End: 2018-12-05 | Stop reason: HOSPADM

## 2018-12-05 RX ORDER — OXYCODONE HYDROCHLORIDE 5 MG/1
5 TABLET ORAL AS NEEDED
Status: DISCONTINUED | OUTPATIENT
Start: 2018-12-05 | End: 2018-12-05 | Stop reason: HOSPADM

## 2018-12-05 RX ORDER — METOPROLOL TARTRATE 5 MG/5ML
INJECTION INTRAVENOUS AS NEEDED
Status: DISCONTINUED | OUTPATIENT
Start: 2018-12-05 | End: 2018-12-05 | Stop reason: HOSPADM

## 2018-12-05 RX ORDER — SODIUM CHLORIDE 9 MG/ML
25 INJECTION, SOLUTION INTRAVENOUS CONTINUOUS
Status: DISCONTINUED | OUTPATIENT
Start: 2018-12-05 | End: 2018-12-05 | Stop reason: HOSPADM

## 2018-12-05 RX ORDER — GLYCOPYRROLATE 0.2 MG/ML
INJECTION INTRAMUSCULAR; INTRAVENOUS AS NEEDED
Status: DISCONTINUED | OUTPATIENT
Start: 2018-12-05 | End: 2018-12-05 | Stop reason: HOSPADM

## 2018-12-05 RX ORDER — SODIUM CHLORIDE, SODIUM LACTATE, POTASSIUM CHLORIDE, CALCIUM CHLORIDE 600; 310; 30; 20 MG/100ML; MG/100ML; MG/100ML; MG/100ML
INJECTION, SOLUTION INTRAVENOUS
Status: DISCONTINUED | OUTPATIENT
Start: 2018-12-05 | End: 2018-12-05 | Stop reason: HOSPADM

## 2018-12-05 RX ORDER — SODIUM CHLORIDE 9 MG/ML
INJECTION, SOLUTION INTRAVENOUS
Status: DISCONTINUED | OUTPATIENT
Start: 2018-12-05 | End: 2018-12-05 | Stop reason: HOSPADM

## 2018-12-05 RX ORDER — SODIUM CHLORIDE 0.9 % (FLUSH) 0.9 %
5-10 SYRINGE (ML) INJECTION EVERY 8 HOURS
Status: DISCONTINUED | OUTPATIENT
Start: 2018-12-05 | End: 2018-12-05 | Stop reason: HOSPADM

## 2018-12-05 RX ORDER — LIDOCAINE HYDROCHLORIDE AND EPINEPHRINE 10; 10 MG/ML; UG/ML
INJECTION, SOLUTION INFILTRATION; PERINEURAL
Status: DISCONTINUED
Start: 2018-12-05 | End: 2018-12-05 | Stop reason: HOSPADM

## 2018-12-05 RX ORDER — LIDOCAINE HYDROCHLORIDE 20 MG/ML
INJECTION, SOLUTION EPIDURAL; INFILTRATION; INTRACAUDAL; PERINEURAL AS NEEDED
Status: DISCONTINUED | OUTPATIENT
Start: 2018-12-05 | End: 2018-12-05 | Stop reason: HOSPADM

## 2018-12-05 RX ORDER — PROPOFOL 10 MG/ML
INJECTION, EMULSION INTRAVENOUS AS NEEDED
Status: DISCONTINUED | OUTPATIENT
Start: 2018-12-05 | End: 2018-12-05 | Stop reason: HOSPADM

## 2018-12-05 RX ORDER — LIDOCAINE HYDROCHLORIDE 10 MG/ML
0.1 INJECTION, SOLUTION EPIDURAL; INFILTRATION; INTRACAUDAL; PERINEURAL AS NEEDED
Status: DISCONTINUED | OUTPATIENT
Start: 2018-12-05 | End: 2018-12-05 | Stop reason: HOSPADM

## 2018-12-05 RX ORDER — ROCURONIUM BROMIDE 10 MG/ML
INJECTION, SOLUTION INTRAVENOUS AS NEEDED
Status: DISCONTINUED | OUTPATIENT
Start: 2018-12-05 | End: 2018-12-05 | Stop reason: HOSPADM

## 2018-12-05 RX ADMIN — SUCCINYLCHOLINE CHLORIDE 40 MG: 20 INJECTION INTRAMUSCULAR; INTRAVENOUS at 13:05

## 2018-12-05 RX ADMIN — SODIUM CHLORIDE, SODIUM LACTATE, POTASSIUM CHLORIDE, CALCIUM CHLORIDE: 600; 310; 30; 20 INJECTION, SOLUTION INTRAVENOUS at 13:22

## 2018-12-05 RX ADMIN — ONDANSETRON 4 MG: 2 INJECTION INTRAMUSCULAR; INTRAVENOUS at 13:33

## 2018-12-05 RX ADMIN — ROCURONIUM BROMIDE 5 MG: 10 INJECTION, SOLUTION INTRAVENOUS at 12:25

## 2018-12-05 RX ADMIN — SUCCINYLCHOLINE CHLORIDE 200 MG: 20 INJECTION INTRAMUSCULAR; INTRAVENOUS at 12:26

## 2018-12-05 RX ADMIN — LIDOCAINE HYDROCHLORIDE 100 MG: 20 INJECTION, SOLUTION EPIDURAL; INFILTRATION; INTRACAUDAL; PERINEURAL at 12:24

## 2018-12-05 RX ADMIN — GLYCOPYRROLATE 0.4 MG: 0.2 INJECTION INTRAMUSCULAR; INTRAVENOUS at 13:15

## 2018-12-05 RX ADMIN — METOPROLOL TARTRATE 1 MG: 5 INJECTION INTRAVENOUS at 12:50

## 2018-12-05 RX ADMIN — PROPOFOL 100 MG: 10 INJECTION, EMULSION INTRAVENOUS at 13:05

## 2018-12-05 RX ADMIN — ROCURONIUM BROMIDE 45 MG: 10 INJECTION, SOLUTION INTRAVENOUS at 12:30

## 2018-12-05 RX ADMIN — NEOSTIGMINE METHYLSULFATE 3 MG: 1 INJECTION INTRAVENOUS at 13:15

## 2018-12-05 RX ADMIN — PROPOFOL 200 MG: 10 INJECTION, EMULSION INTRAVENOUS at 12:24

## 2018-12-05 RX ADMIN — SODIUM CHLORIDE: 9 INJECTION, SOLUTION INTRAVENOUS at 12:21

## 2018-12-05 RX ADMIN — METOPROLOL TARTRATE 1 MG: 5 INJECTION INTRAVENOUS at 12:45

## 2018-12-05 RX ADMIN — KETOROLAC TROMETHAMINE 15 MG: 30 INJECTION, SOLUTION INTRAMUSCULAR at 14:13

## 2018-12-05 RX ADMIN — GLYCOPYRROLATE 0.2 MG: 0.2 INJECTION INTRAMUSCULAR; INTRAVENOUS at 12:28

## 2018-12-05 RX ADMIN — PROPOFOL 50 MG: 10 INJECTION, EMULSION INTRAVENOUS at 12:26

## 2018-12-05 NOTE — PROGRESS NOTES
Patient and  given discharge report. Son Eli Kelly) called and filled in on mothers condition. Patient denies chest pain, denies SOB upon discharge. Discharge instructions given and verbalized understanding. Prior to discharge, patient reports Head ache pain 4-5/10. MD made aware and requests Toradol 15 mg IV given. 20 minutes following IV administration, patient reports 0/10 pain.

## 2018-12-05 NOTE — ANESTHESIA PREPROCEDURE EVALUATION
Anesthetic History   No history of anesthetic complications            Review of Systems / Medical History  Patient summary reviewed, nursing notes reviewed and pertinent labs reviewed    Pulmonary        Sleep apnea    Asthma        Neuro/Psych   Within defined limits           Cardiovascular  Within defined limits  Hypertension                   GI/Hepatic/Renal  Within defined limits              Endo/Other  Within defined limits  Diabetes    Morbid obesity     Other Findings              Physical Exam    Airway  Mallampati: II  TM Distance: > 6 cm  Neck ROM: normal range of motion   Mouth opening: Normal     Cardiovascular  Regular rate and rhythm,  S1 and S2 normal,  no murmur, click, rub, or gallop             Dental  No notable dental hx       Pulmonary  Breath sounds clear to auscultation               Abdominal  GI exam deferred       Other Findings            Anesthetic Plan    ASA: 3  Anesthesia type: general          Induction: Intravenous  Anesthetic plan and risks discussed with: Patient

## 2018-12-05 NOTE — PROCEDURES
[facility]  Bronchoscopy Report    Procedure: Diagnostic bronchoscopy. Indication: Abnormal chest imaging    Consent/Treatment: Informed consent was obtained from the  patient after risks, benefits and alternatives were explained. Timeout verified the correct patient and correct procedure. Anesthesia:   General anesthesia was performed by anesthesiology        Procedure Details:   -- The bronchoscope was introduced through an endotracheal tube. -- The vocal cords were not seen. -- The trachea and deena were completely inspected and were found to be normal.  -- The right-sided endobronchial anatomy was completely inspected and was found to be normal.  -- The left-sided endobronchial anatomy was completely inspected and was found to be normal.     Specimens: The bronchoscope was wedged in the KITA superior division and bronchoalveolar lavage was performed; material was sent for  microbiology, cytology, AFB smear and culture, fungal culture and cell count and diff     TBLBX X 8 done of lingular consolidation using [employer] navigational system  cyto brush under veran guidance of this consolidation also done.     Rapid On-Site Evaluation: A preliminary diagnosis of ? reactive lung parenchyma by on site patholgist- additional tissue obtained for cell block    Complications: none    Estimated Blood Loss: Minimal    Michael Grimes MD

## 2018-12-05 NOTE — H&P
PCCM  Pt seen and examined. No new complaints. Risks and benefits of procedure explained and pt wishes to proceed. Impression:  Persistent KITA ASD ? BOOP ? RAYSHAWN    Plan:  veran navigational bronch - plan BAL KITA for micro and TBLBX under navigational guidance.

## 2018-12-05 NOTE — DISCHARGE INSTRUCTIONS
Name: Ml Gant   : 1965   MRN: [id number]   Date: 2018 1:22 PM            BRONCHOSCOPY / EUS / EBUS DISCHARGE INSTRUCTIONS  Discomfort:  Sore throat- throat lozenges or warm salt water gargle  redness at IV site- apply warm compress to area; if redness or soreness persist- contact your physician  Gaseous discomfort- walking, belching will help relieve any discomfort  Should not operate a vehicle for at least 12 hours  You should not engage in an occupation involving machinery or appliances for rest of today  You should not drink alcoholic beverages for at least 12 hours  Avoid making any critical decisions for at least 24 hour  Blood tinged secretions - this should stop within 2-3 hours  DIET  Nothing by mouth- do not eat or drink for two hours. You may eat and drink after 5 pm   You may resume your regular diet - however -  remember your colon is empty and a heavy meal will    produce gas. Avoid these foods:  vegetables, fried / greasy foods, carbonated drinks  MEDICATIONS:      ACTIVITY  You may resume your normal daily activities however it is recommended that you spend the remainder of the day resting -  avoid any strenuous activity. CALL M.D. ANY SIGN OF   Increasing pain, nausea, vomiting  New increased bleeding  Fever (chills)  Pain in chest area  Bloody discharge from nose or mouth  Shortness of breath  Bubbles under the skin around the collarbone. These may crackle and pop when you press on them. Coughing up more than ½ cup of blood. Call [phone] office for the following  Results of procedure / biopsy in 7 days  Appointment in 7-10 days  Telephone #  [phone]  Additional instructions: If you have not heard the results of your test by next friday please call the phone number listed above.

## 2018-12-05 NOTE — ANESTHESIA POSTPROCEDURE EVALUATION
Procedure(s):  BRONCHOSCOPY NAVIGATIONAL   ENDOSCOPIC BRUSHING  BRONCHIAL WASHINGS FLEXIBLE. Anesthesia Post Evaluation        Patient location during evaluation: PACU  Patient participation: complete - patient participated  Level of consciousness: awake and alert  Pain management: adequate  Airway patency: patent  Anesthetic complications: no  Cardiovascular status: acceptable  Respiratory status: acceptable  Hydration status: acceptable  Comments: I have seen and evaluated the patient and is ready for discharge.  Maverick Dallas MD    Post anesthesia nausea and vomiting:  none      Visit Vitals  /89   Pulse (!) 101   Temp 36.6 °C (97.8 °F)   Resp 20   Ht 5' (1.524 m)   Wt 126.1 kg (278 lb)   SpO2 98%   BMI 54.29 kg/m²

## 2018-12-07 LAB
BACTERIA SPEC CULT: NORMAL
GRAM STN SPEC: NORMAL
SERVICE CMNT-IMP: NORMAL

## 2018-12-11 LAB
BACTERIA SPEC CULT: ABNORMAL
BACTERIA SPEC CULT: ABNORMAL
GRAM STN SPEC: ABNORMAL
GRAM STN SPEC: ABNORMAL
SERVICE CMNT-IMP: ABNORMAL

## 2018-12-14 ENCOUNTER — HOSPITAL ENCOUNTER (OUTPATIENT)
Dept: MAMMOGRAPHY | Age: 53
Discharge: HOME | End: 2018-12-14
Attending: INTERNAL MEDICINE
Payer: COMMERCIAL

## 2018-12-14 DIAGNOSIS — Z12.31 VISIT FOR SCREENING MAMMOGRAM: ICD-10-CM

## 2018-12-14 PROCEDURE — 77067 SCR MAMMO BI INCL CAD: CPT

## 2019-01-05 LAB
BACTERIA SPEC CULT: NORMAL
SERVICE CMNT-IMP: NORMAL

## 2019-01-19 LAB
ACID FAST STN SPEC: NEGATIVE
ACID FAST STN SPEC: NEGATIVE
MYCOBACTERIUM SPEC QL CULT: NEGATIVE
MYCOBACTERIUM SPEC QL CULT: NEGATIVE
SPECIMEN PREPARATION: NORMAL
SPECIMEN PREPARATION: NORMAL
SPECIMEN SOURCE: NORMAL
SPECIMEN SOURCE: NORMAL

## 2019-01-30 ENCOUNTER — HOSPITAL ENCOUNTER (OUTPATIENT)
Dept: CT IMAGING | Age: 54
Discharge: HOME | End: 2019-01-30
Attending: NURSE PRACTITIONER
Payer: COMMERCIAL

## 2019-01-30 DIAGNOSIS — J84.116 CRYPTOGENIC ORGANIZING PNEUMONIA (HCC): ICD-10-CM

## 2019-01-30 PROCEDURE — 71250 CT THORAX DX C-: CPT

## 2019-02-12 ENCOUNTER — OFFICE VISIT (OUTPATIENT)
Dept: SURGERY | Age: 54
End: 2019-02-12

## 2019-02-12 VITALS
OXYGEN SATURATION: 96 % | HEART RATE: 120 BPM | RESPIRATION RATE: 20 BRPM | BODY MASS INDEX: 54.58 KG/M2 | SYSTOLIC BLOOD PRESSURE: 157 MMHG | WEIGHT: 278 LBS | DIASTOLIC BLOOD PRESSURE: 107 MMHG | HEIGHT: 60 IN

## 2019-02-12 DIAGNOSIS — E66.01 MORBID OBESITY (HCC): ICD-10-CM

## 2019-02-12 DIAGNOSIS — J84.116 CRYPTOGENIC ORGANIZING PNEUMONIA (HCC): Primary | ICD-10-CM

## 2019-02-12 DIAGNOSIS — J84.9 ILD (INTERSTITIAL LUNG DISEASE) (HCC): ICD-10-CM

## 2019-02-12 RX ORDER — REPAGLINIDE 1 MG/1
TABLET ORAL
Refills: 2 | COMMUNITY
Start: 2018-12-19 | End: 2020-01-17

## 2019-02-12 RX ORDER — DILTIAZEM HYDROCHLORIDE 240 MG/1
CAPSULE, EXTENDED RELEASE ORAL
Refills: 2 | COMMUNITY
Start: 2018-12-19

## 2019-02-12 RX ORDER — ENALAPRIL MALEATE 5 MG/1
5 TABLET ORAL DAILY
Refills: 1 | COMMUNITY
Start: 2018-12-15

## 2019-02-12 RX ORDER — IPRATROPIUM BROMIDE AND ALBUTEROL SULFATE 2.5; .5 MG/3ML; MG/3ML
SOLUTION RESPIRATORY (INHALATION)
Refills: 5 | COMMUNITY
Start: 2019-01-22 | End: 2020-01-17

## 2019-02-12 RX ORDER — ERGOCALCIFEROL 1.25 MG/1
CAPSULE ORAL
Refills: 0 | COMMUNITY
Start: 2019-01-15 | End: 2020-01-17

## 2019-02-12 RX ORDER — PREDNISONE 10 MG/1
TABLET ORAL
Refills: 2 | COMMUNITY
Start: 2019-02-05 | End: 2020-01-17

## 2019-02-12 RX ORDER — HYDRALAZINE HYDROCHLORIDE 10 MG/1
TABLET, FILM COATED ORAL AS NEEDED
COMMUNITY
Start: 2018-12-19 | End: 2020-01-17

## 2019-02-12 RX ORDER — METFORMIN HYDROCHLORIDE 500 MG/1
TABLET, EXTENDED RELEASE ORAL
Refills: 2 | Status: ON HOLD | COMMUNITY
Start: 2018-12-19 | End: 2020-01-18 | Stop reason: SDUPTHER

## 2019-02-12 RX ORDER — CLONIDINE HYDROCHLORIDE 0.1 MG/1
TABLET ORAL
Refills: 1 | COMMUNITY
Start: 2019-02-07 | End: 2020-01-17

## 2019-02-12 RX ORDER — SULFAMETHOXAZOLE AND TRIMETHOPRIM 800; 160 MG/1; MG/1
TABLET ORAL
Refills: 2 | COMMUNITY
Start: 2019-02-04 | End: 2020-01-17

## 2019-02-12 NOTE — PROGRESS NOTES
Asked to see Mrs Don Altamirano re: possible lung biopsy    Referred by Dr. Melissa Dyson    Mrs Don Altamirano is a pleasant 49 y/o lady with a past medical history significant for morbid obesity, HTN and asthma. For the past year or so she has been evaluated and treated by Pulmonary medicine for increasing dyspnea as well a radiographic changes. She has been treated with Antibiotics and steroids. She feels like she hasn't gotten any better and her wheezing and dyspnea are getting worse. Denies pleuritic chest pain.      No Known Allergies    PMHx: obesity, HTN, HLD, DMII, asthma    PSHx: hysterectomy, Lap chas    SocHx: non smoker    Family History   Problem Relation Age of Onset    Other Mother         CAD   [facility] Hypertension Mother     Hypertension Father     Kidney Disease Father         End Stage Renal, Dialysis    Hypertension Brother     Diabetes Paternal Uncle     Other Maternal Grandmother         CAD    Heart Attack Maternal Grandmother     Cancer Maternal Grandfather         Throat Cancer    Dementia Paternal Grandmother         Alzheimer's Dementia    Cancer Paternal Grandfather         Colon Cancer    Breast Cancer Maternal Aunt 62        mothers 1/2 sister    Diabetes Paternal Uncle        Outpatient Medications Marked as Taking for the 2/12/19 encounter (Office Visit) with Hesham Starkey MD   Medication Sig Dispense Refill    cloNIDine HCl (CATAPRES) 0.1 mg tablet TAKE 1 TABLET EVERY 8 HOURS ORALLY  1    DILT- mg XR capsule TAKE 1 CAPSULE BY MOUTH EVERY DAY ON EMPTY STOMACH IN THE MORNING  2    enalapril (VASOTEC) 20 mg tablet TAKE 1 TABLET BY MOUTH EVERY DAY  1    ergocalciferol (ERGOCALCIFEROL) 50,000 unit capsule TAKE 1 CAPSULE BY MOUTH ONCE A WEEK  0    hydrALAZINE (APRESOLINE) 10 mg tablet       albuterol-ipratropium (DUO-NEB) 2.5 mg-0.5 mg/3 ml nebu USE 1 VIAL VIA NEBULIZER EVERY 6 HOURS  5    metFORMIN ER (GLUCOPHAGE XR) 500 mg tablet TAKE 2 TABLETS BY MOUTH TWICE A DAY  2    repaglinide (PRANDIN) 1 mg tablet TAKE 1 TABLET BY MOUTH TWICE A DAY 15-30 MINUTES BEFORE MEALS  2    trimethoprim-sulfamethoxazole (BACTRIM DS, SEPTRA DS) 160-800 mg per tablet TAKE 1 TABLET BY MOUTH 3 TIMES A WEEK, MONDAY, WEDNESDAY, & FRIDAY  2    predniSONE (DELTASONE) 10 mg tablet TAKE 3 TABLETS BY MOUTH DAILY  2    amLODIPine (NORVASC) 5 mg tablet Take 1 Tab by mouth daily. 30 Tab 0    olopatadine (PAZEO) 0.7 % drop Administer 1 Drop to both eyes daily.  therapeutic multivitamin (THERAGRAN) tablet Take 1 Tab by mouth daily.  raNITIdine (ZANTAC) 150 mg tablet Take 150 mg by mouth daily as needed. Indications: gastroesophageal reflux disease      albuterol (PROVENTIL HFA, VENTOLIN HFA) 90 mcg/actuation inhaler Take 2 Puffs by inhalation every six (6) hours as needed for Wheezing. 1 Inhaler 1       ROS:    Constitutional- endorses weight gain from steroids  HEENT- denies dysphagia  Neuro- denies syncope  Optho- no recent changes in vision  Resp- dyspnea and wheezing  CV- denies angina or palpitations  GI- denies abd pain  - no complaints  ID- denies recent fevers  Vasc- denies claudication  MSK joint pain    Blood pressure (!) 157/107, pulse (!) 120, resp. rate 20, height 5' (1.524 m), weight 278 lb (126.1 kg), SpO2 96 %. On exam she is seated upright  Alert and oriented  obese  \"breathy\" speech, normal affect  Not tachypneic  No goiter  Breath sounds faint wheezes bilaterally  Rrr, no murmurs  Radial pulses palp b/l  Abd SNTND, no organomegaly  Mild pedal edema      ==============================    Cr 0.82    ==============================    I have personally reviewed her Chest CT. There is a non specific consolidative appearance of the lung and with septal thickening and ground glass opacities. The left lung appears to be more affected than the right.   Some associated lymphadenoapthy    ==============================    PFTs-  FeV1  102%  DLCO 52%    ==============================    Diagnoses  1:  Cryptogenic organizing pneumonia  2: morbid obesity    =============================    Mrs. Ankita Fong has increasing dyspnea and increasing limitations to her ability to carry out routine tasks. She is no longer as mobile or active as she used to be. Certainly her radiographic findings do support an element of pulmonary disease. From a purely pulmonary standpoint I do think that a lung biopsy would help diagnose what is going on in her lungs and help guide treatment. I would recommend a left VATS lung biopsy. From an overall health and respiratory standpoint I think her dyspnea and limitations to her daily activities are multifactorial.  I had a franco discussion with her that her morbid obesity was also greatly contributing to her dyspnea and respiratory issues. She is a moderate to high surgical risk based on her obesity and BMI>50. Despite the known mere-operative risks, and knowing that a Lung biopsy and subsequent diagnosis/treatment may not make her a lot better, she would like to proceed so that she at least has peace of mind about knowing what is wrong with her lungs. Will post for MArch per patient request.  I would also like to discuss her case again with Dr. Gary Norris. Thank you for allowing us to care for Mrs Damian Ryan spent 60 minutes with Sherie Torre and their family, greater than 50% of which was spent in counseling and education reviewing their films, discussing surgical options and formulating a future care plan.

## 2019-02-12 NOTE — PROGRESS NOTES
New Patient. Abnormal xray. 1. Have you been to the ER, urgent care clinic since your last visit? Hospitalized since your last visit? No    2. Have you seen or consulted any other health care providers outside of the [facility] since your last visit? Include any pap smears or colon screening. Yes, [facility].

## 2019-02-13 ENCOUNTER — TELEPHONE (OUTPATIENT)
Dept: SURGERY | Age: 54
End: 2019-02-13

## 2019-02-13 NOTE — TELEPHONE ENCOUNTER
Patient returned my call. She wanted to confirm that the procedure was being done laparoscopically, as was told to her by the . I confirmed that it was not an open procedure (VATS). She also asked how long she would be out of work. I advised her that it would be 4-6 weeks, more likely 6 weeks. The patient verbalized understanding of all.

## 2019-03-11 NOTE — PROGRESS NOTES
Bedside and Verbal shift change report given to Chadwick (oncoming nurse) by Shirley Driver (offgoing nurse). Report included the following information SBAR, Kardex and MAR. no loss of consciousness, no gait abnormality, no headache, no sensory deficits, and no weakness.

## 2019-03-19 ENCOUNTER — HOSPITAL ENCOUNTER (OUTPATIENT)
Dept: PREADMISSION TESTING | Age: 54
Discharge: HOME | End: 2019-03-19
Payer: COMMERCIAL

## 2019-03-19 VITALS
SYSTOLIC BLOOD PRESSURE: 149 MMHG | WEIGHT: 293 LBS | HEART RATE: 99 BPM | HEIGHT: 60 IN | TEMPERATURE: 98.8 F | OXYGEN SATURATION: 99 % | DIASTOLIC BLOOD PRESSURE: 94 MMHG | BODY MASS INDEX: 57.52 KG/M2

## 2019-03-19 LAB
ABO + RH BLD: NORMAL
AMORPH CRY URNS QL MICRO: ABNORMAL
ANION GAP SERPL CALC-SCNC: 6 MMOL/L (ref 5–15)
APPEARANCE UR: ABNORMAL
BACTERIA URNS QL MICRO: NEGATIVE /HPF
BILIRUB UR QL: NEGATIVE
BLOOD GROUP ANTIBODIES SERPL: NORMAL
BUN SERPL-MCNC: 15 MG/DL (ref 6–20)
BUN/CREAT SERPL: 13 (ref 12–20)
CALCIUM SERPL-MCNC: 9 MG/DL (ref 8.5–10.1)
CHLORIDE SERPL-SCNC: 107 MMOL/L (ref 97–108)
CO2 SERPL-SCNC: 29 MMOL/L (ref 21–32)
COLOR UR: ABNORMAL
CREAT SERPL-MCNC: 1.12 MG/DL (ref 0.55–1.02)
EPITH CASTS URNS QL MICRO: ABNORMAL /LPF
ERYTHROCYTE [DISTWIDTH] IN BLOOD BY AUTOMATED COUNT: 13.9 % (ref 11.5–14.5)
EST. AVERAGE GLUCOSE BLD GHB EST-MCNC: 126 MG/DL
GLUCOSE SERPL-MCNC: 110 MG/DL (ref 65–100)
GLUCOSE UR STRIP.AUTO-MCNC: NEGATIVE MG/DL
HBA1C MFR BLD: 6 % (ref 4.2–6.3)
HCT VFR BLD AUTO: 34.4 % (ref 35–47)
HGB BLD-MCNC: 10.4 G/DL (ref 11.5–16)
HGB UR QL STRIP: NEGATIVE
KETONES UR QL STRIP.AUTO: NEGATIVE MG/DL
LEUKOCYTE ESTERASE UR QL STRIP.AUTO: NEGATIVE
MCH RBC QN AUTO: 29.6 PG (ref 26–34)
MCHC RBC AUTO-ENTMCNC: 30.2 G/DL (ref 30–36.5)
MCV RBC AUTO: 98 FL (ref 80–99)
MUCOUS THREADS URNS QL MICRO: ABNORMAL /LPF
NITRITE UR QL STRIP.AUTO: NEGATIVE
NRBC # BLD: 0 K/UL (ref 0–0.01)
NRBC BLD-RTO: 0 PER 100 WBC
PH UR STRIP: 7.5 [PH] (ref 5–8)
PLATELET # BLD AUTO: 327 K/UL (ref 150–400)
PMV BLD AUTO: 10.6 FL (ref 8.9–12.9)
POTASSIUM SERPL-SCNC: 4.3 MMOL/L (ref 3.5–5.1)
PROT UR STRIP-MCNC: ABNORMAL MG/DL
RBC # BLD AUTO: 3.51 M/UL (ref 3.8–5.2)
RBC #/AREA URNS HPF: ABNORMAL /HPF (ref 0–5)
SODIUM SERPL-SCNC: 142 MMOL/L (ref 136–145)
SP GR UR REFRACTOMETRY: 1.03 (ref 1–1.03)
SPECIMEN EXP DATE BLD: NORMAL
UA: UC IF INDICATED,UAUC: ABNORMAL
UROBILINOGEN UR QL STRIP.AUTO: 0.2 EU/DL (ref 0.2–1)
WBC # BLD AUTO: 8.1 K/UL (ref 3.6–11)
WBC URNS QL MICRO: ABNORMAL /HPF (ref 0–4)

## 2019-03-19 PROCEDURE — 36415 COLL VENOUS BLD VENIPUNCTURE: CPT

## 2019-03-19 PROCEDURE — 81001 URINALYSIS AUTO W/SCOPE: CPT

## 2019-03-19 PROCEDURE — 86900 BLOOD TYPING SEROLOGIC ABO: CPT

## 2019-03-19 PROCEDURE — 83036 HEMOGLOBIN GLYCOSYLATED A1C: CPT

## 2019-03-19 PROCEDURE — 85027 COMPLETE CBC AUTOMATED: CPT

## 2019-03-19 PROCEDURE — 80048 BASIC METABOLIC PNL TOTAL CA: CPT

## 2019-03-19 RX ORDER — GUAIFENESIN 100 MG/5ML
81 LIQUID (ML) ORAL DAILY
COMMUNITY

## 2019-03-19 RX ORDER — MELATONIN 5 MG
5 CAPSULE ORAL
COMMUNITY
End: 2020-01-17

## 2019-03-19 NOTE — PERIOP NOTES
PREOPERATIVE INSTRUCTIONS REVIEWED WITH PATIENT. PATIENT GIVEN TWO-- SIX PACKS OF CHG WIPES. INSTRUCTIONS REVIEWED ON USE OF CHG WIPES. PATIENT GIVEN SSI INFECTIONS SHEET. PATIENT WAS GIVEN THE OPPORTUNITY TO ASK QUESTIONS ON THE INFORMATION PROVIDED. THERON DIA) NOTIFIED OF PATIENT WEIGHT:  310 LBS.

## 2019-03-21 ENCOUNTER — TELEPHONE (OUTPATIENT)
Dept: SURGERY | Age: 54
End: 2019-03-21

## 2019-03-21 NOTE — PERIOP NOTES
EKG reviewed by anesthesia and patient needs a more recent EKG due to sinus tachycardia. Patient called at home and an appointment was scheduled for her to return to [facility] at 8:00am on 3/22/19 for EKG. Surgery is scheduled for 3/25/19.

## 2019-03-21 NOTE — TELEPHONE ENCOUNTER
Patient called stating that she is having surgery on Monday, 3/25. Her job is giving her a hard time b/c she has been out for an extended time due to illness and they are requesting documentation on why she needs the surgery. She stated that it can be addressed to \"To Whom It May Concern. \"   Fax it directly to her. Fax # [phone]    I advised the patient that I spoke with Dr. Kindra Cooper and he stated that he would write something up tomorrow and I would fax it to her tomorrow. The patient verbalized understanding of all.

## 2019-03-22 ENCOUNTER — HOSPITAL ENCOUNTER (OUTPATIENT)
Dept: PREADMISSION TESTING | Age: 54
Discharge: HOME | DRG: 907 | End: 2019-03-22
Payer: COMMERCIAL

## 2019-03-22 PROCEDURE — 93005 ELECTROCARDIOGRAM TRACING: CPT

## 2019-03-22 NOTE — TELEPHONE ENCOUNTER
Called patient to let her know I have the letter that Dr. Dilcia Gray wrote. Advised her that I was faxing it to her and to please call me back to let me know that she received it.

## 2019-03-24 ENCOUNTER — ANESTHESIA EVENT (OUTPATIENT)
Dept: SURGERY | Age: 54
DRG: 907 | End: 2019-03-24
Payer: COMMERCIAL

## 2019-03-24 LAB
ATRIAL RATE: 100 BPM
CALCULATED P AXIS, ECG09: 35 DEGREES
CALCULATED R AXIS, ECG10: -9 DEGREES
DIAGNOSIS, 93000: NORMAL
P-R INTERVAL, ECG05: 156 MS
Q-T INTERVAL, ECG07: 354 MS
QRS DURATION, ECG06: 68 MS
QTC CALCULATION (BEZET), ECG08: 456 MS
VENTRICULAR RATE, ECG03: 100 BPM

## 2019-03-25 ENCOUNTER — APPOINTMENT (OUTPATIENT)
Dept: GENERAL RADIOLOGY | Age: 54
DRG: 907 | End: 2019-03-25
Attending: THORACIC SURGERY (CARDIOTHORACIC VASCULAR SURGERY)
Payer: COMMERCIAL

## 2019-03-25 ENCOUNTER — ANESTHESIA (OUTPATIENT)
Dept: SURGERY | Age: 54
DRG: 907 | End: 2019-03-25
Payer: COMMERCIAL

## 2019-03-25 ENCOUNTER — HOSPITAL ENCOUNTER (INPATIENT)
Age: 54
LOS: 3 days | Discharge: HOME | DRG: 907 | End: 2019-03-28
Attending: THORACIC SURGERY (CARDIOTHORACIC VASCULAR SURGERY) | Admitting: THORACIC SURGERY (CARDIOTHORACIC VASCULAR SURGERY)
Payer: COMMERCIAL

## 2019-03-25 DIAGNOSIS — G89.18 POST-OP PAIN: Primary | ICD-10-CM

## 2019-03-25 PROBLEM — J84.9 ILD (INTERSTITIAL LUNG DISEASE) (HCC): Status: ACTIVE | Noted: 2019-03-25

## 2019-03-25 LAB
GLUCOSE BLD STRIP.AUTO-MCNC: 122 MG/DL (ref 65–100)
GLUCOSE BLD STRIP.AUTO-MCNC: 122 MG/DL (ref 65–100)
GLUCOSE BLD STRIP.AUTO-MCNC: 146 MG/DL (ref 65–100)
GLUCOSE BLD STRIP.AUTO-MCNC: 94 MG/DL (ref 65–100)
SERVICE CMNT-IMP: ABNORMAL
SERVICE CMNT-IMP: NORMAL

## 2019-03-25 PROCEDURE — 0BBJ0ZX EXCISION OF LEFT LOWER LUNG LOBE, OPEN APPROACH, DIAGNOSTIC: ICD-10-PCS | Performed by: THORACIC SURGERY (CARDIOTHORACIC VASCULAR SURGERY)

## 2019-03-25 PROCEDURE — 74011000250 HC RX REV CODE- 250

## 2019-03-25 PROCEDURE — 65660000000 HC RM CCU STEPDOWN

## 2019-03-25 PROCEDURE — 88305 TISSUE EXAM BY PATHOLOGIST: CPT

## 2019-03-25 PROCEDURE — 77030022474 HC RELD STPLR ENDO GIA COVD -C: Performed by: THORACIC SURGERY (CARDIOTHORACIC VASCULAR SURGERY)

## 2019-03-25 PROCEDURE — 0BB90ZX EXCISION OF LINGULA BRONCHUS, OPEN APPROACH, DIAGNOSTIC: ICD-10-PCS | Performed by: THORACIC SURGERY (CARDIOTHORACIC VASCULAR SURGERY)

## 2019-03-25 PROCEDURE — 74011250637 HC RX REV CODE- 250/637: Performed by: THORACIC SURGERY (CARDIOTHORACIC VASCULAR SURGERY)

## 2019-03-25 PROCEDURE — 76010000171 HC OR TIME 2 TO 2.5 HR INTENSV-TIER 1: Performed by: THORACIC SURGERY (CARDIOTHORACIC VASCULAR SURGERY)

## 2019-03-25 PROCEDURE — 77030012390 HC DRN CHST BTL GTNG -B: Performed by: THORACIC SURGERY (CARDIOTHORACIC VASCULAR SURGERY)

## 2019-03-25 PROCEDURE — 77030039266 HC ADH SKN EXOFIN S2SG -A: Performed by: THORACIC SURGERY (CARDIOTHORACIC VASCULAR SURGERY)

## 2019-03-25 PROCEDURE — 74011250636 HC RX REV CODE- 250/636: Performed by: THORACIC SURGERY (CARDIOTHORACIC VASCULAR SURGERY)

## 2019-03-25 PROCEDURE — 77030031139 HC SUT VCRL2 J&J -A: Performed by: THORACIC SURGERY (CARDIOTHORACIC VASCULAR SURGERY)

## 2019-03-25 PROCEDURE — 3E0T3BZ INTRODUCTION OF ANESTHETIC AGENT INTO PERIPHERAL NERVES AND PLEXI, PERCUTANEOUS APPROACH: ICD-10-PCS | Performed by: THORACIC SURGERY (CARDIOTHORACIC VASCULAR SURGERY)

## 2019-03-25 PROCEDURE — C2615 SEALANT, PULMONARY, LIQUID: HCPCS | Performed by: THORACIC SURGERY (CARDIOTHORACIC VASCULAR SURGERY)

## 2019-03-25 PROCEDURE — 77030034696 HC CATH URETH FOL 2W BARD -A: Performed by: THORACIC SURGERY (CARDIOTHORACIC VASCULAR SURGERY)

## 2019-03-25 PROCEDURE — 77030018846 HC SOL IRR STRL H20 ICUM -A: Performed by: THORACIC SURGERY (CARDIOTHORACIC VASCULAR SURGERY)

## 2019-03-25 PROCEDURE — 77030008771 HC TU NG SALEM SUMP -A: Performed by: ANESTHESIOLOGY

## 2019-03-25 PROCEDURE — 77030032490 HC SLV COMPR SCD KNE COVD -B: Performed by: THORACIC SURGERY (CARDIOTHORACIC VASCULAR SURGERY)

## 2019-03-25 PROCEDURE — 76210000016 HC OR PH I REC 1 TO 1.5 HR: Performed by: THORACIC SURGERY (CARDIOTHORACIC VASCULAR SURGERY)

## 2019-03-25 PROCEDURE — 0BJL4ZZ INSPECTION OF LEFT LUNG, PERCUTANEOUS ENDOSCOPIC APPROACH: ICD-10-PCS | Performed by: THORACIC SURGERY (CARDIOTHORACIC VASCULAR SURGERY)

## 2019-03-25 PROCEDURE — 71045 X-RAY EXAM CHEST 1 VIEW: CPT

## 2019-03-25 PROCEDURE — 87176 TISSUE HOMOGENIZATION CULTR: CPT

## 2019-03-25 PROCEDURE — 74011000250 HC RX REV CODE- 250: Performed by: THORACIC SURGERY (CARDIOTHORACIC VASCULAR SURGERY)

## 2019-03-25 PROCEDURE — 77030018702 HC CLP LIG TI TELE -A: Performed by: THORACIC SURGERY (CARDIOTHORACIC VASCULAR SURGERY)

## 2019-03-25 PROCEDURE — 74011250636 HC RX REV CODE- 250/636

## 2019-03-25 PROCEDURE — 87102 FUNGUS ISOLATION CULTURE: CPT

## 2019-03-25 PROCEDURE — 87077 CULTURE AEROBIC IDENTIFY: CPT

## 2019-03-25 PROCEDURE — 88307 TISSUE EXAM BY PATHOLOGIST: CPT

## 2019-03-25 PROCEDURE — 87205 SMEAR GRAM STAIN: CPT

## 2019-03-25 PROCEDURE — 87186 SC STD MICRODIL/AGAR DIL: CPT

## 2019-03-25 PROCEDURE — 82962 GLUCOSE BLOOD TEST: CPT

## 2019-03-25 PROCEDURE — C1729 CATH, DRAINAGE: HCPCS | Performed by: THORACIC SURGERY (CARDIOTHORACIC VASCULAR SURGERY)

## 2019-03-25 PROCEDURE — 87116 MYCOBACTERIA CULTURE: CPT

## 2019-03-25 PROCEDURE — 77030020782 HC GWN BAIR PAWS FLX 3M -B

## 2019-03-25 PROCEDURE — 77030018673: Performed by: THORACIC SURGERY (CARDIOTHORACIC VASCULAR SURGERY)

## 2019-03-25 PROCEDURE — 77030014008 HC SPNG HEMSTAT J&J -C: Performed by: THORACIC SURGERY (CARDIOTHORACIC VASCULAR SURGERY)

## 2019-03-25 PROCEDURE — 74011250636 HC RX REV CODE- 250/636: Performed by: ANESTHESIOLOGY

## 2019-03-25 PROCEDURE — 77030003666 HC NDL SPINAL BD -A: Performed by: THORACIC SURGERY (CARDIOTHORACIC VASCULAR SURGERY)

## 2019-03-25 PROCEDURE — 76060000035 HC ANESTHESIA 2 TO 2.5 HR: Performed by: THORACIC SURGERY (CARDIOTHORACIC VASCULAR SURGERY)

## 2019-03-25 PROCEDURE — 94640 AIRWAY INHALATION TREATMENT: CPT

## 2019-03-25 PROCEDURE — 77030013079 HC BLNKT BAIR HGGR 3M -A: Performed by: ANESTHESIOLOGY

## 2019-03-25 PROCEDURE — 77030011264 HC ELECTRD BLD EXT COVD -A: Performed by: THORACIC SURGERY (CARDIOTHORACIC VASCULAR SURGERY)

## 2019-03-25 PROCEDURE — 77030027138 HC INCENT SPIROMETER -A

## 2019-03-25 PROCEDURE — 77030037366 HC STPLR ENDO TRI-STPLR COVD -C: Performed by: THORACIC SURGERY (CARDIOTHORACIC VASCULAR SURGERY)

## 2019-03-25 PROCEDURE — 77030002996 HC SUT SLK J&J -A: Performed by: THORACIC SURGERY (CARDIOTHORACIC VASCULAR SURGERY)

## 2019-03-25 PROCEDURE — 77030035051: Performed by: THORACIC SURGERY (CARDIOTHORACIC VASCULAR SURGERY)

## 2019-03-25 PROCEDURE — 77030018836 HC SOL IRR NACL ICUM -A: Performed by: THORACIC SURGERY (CARDIOTHORACIC VASCULAR SURGERY)

## 2019-03-25 PROCEDURE — 87075 CULTR BACTERIA EXCEPT BLOOD: CPT

## 2019-03-25 PROCEDURE — 77030011640 HC PAD GRND REM COVD -A: Performed by: THORACIC SURGERY (CARDIOTHORACIC VASCULAR SURGERY)

## 2019-03-25 PROCEDURE — 77030008671 HC TU ENDO/BRNC CUF COVD -B: Performed by: ANESTHESIOLOGY

## 2019-03-25 RX ORDER — SODIUM CHLORIDE 0.9 % (FLUSH) 0.9 %
5-40 SYRINGE (ML) INJECTION AS NEEDED
Status: DISCONTINUED | OUTPATIENT
Start: 2019-03-25 | End: 2019-03-28 | Stop reason: HOSPADM

## 2019-03-25 RX ORDER — ALBUTEROL SULFATE 0.83 MG/ML
2.5 SOLUTION RESPIRATORY (INHALATION)
Status: DISCONTINUED | OUTPATIENT
Start: 2019-03-25 | End: 2019-03-28 | Stop reason: HOSPADM

## 2019-03-25 RX ORDER — LABETALOL HCL 20 MG/4 ML
SYRINGE (ML) INTRAVENOUS
Status: COMPLETED
Start: 2019-03-25 | End: 2019-03-25

## 2019-03-25 RX ORDER — MIDAZOLAM HYDROCHLORIDE 1 MG/ML
1 INJECTION, SOLUTION INTRAMUSCULAR; INTRAVENOUS AS NEEDED
Status: DISCONTINUED | OUTPATIENT
Start: 2019-03-25 | End: 2019-03-25 | Stop reason: HOSPADM

## 2019-03-25 RX ORDER — ERGOCALCIFEROL 1.25 MG/1
50000 CAPSULE ORAL
Status: DISCONTINUED | OUTPATIENT
Start: 2019-03-25 | End: 2019-03-28 | Stop reason: HOSPADM

## 2019-03-25 RX ORDER — MIDAZOLAM HYDROCHLORIDE 1 MG/ML
0.5 INJECTION, SOLUTION INTRAMUSCULAR; INTRAVENOUS
Status: DISCONTINUED | OUTPATIENT
Start: 2019-03-25 | End: 2019-03-25 | Stop reason: HOSPADM

## 2019-03-25 RX ORDER — SODIUM CHLORIDE 0.9 % (FLUSH) 0.9 %
5-40 SYRINGE (ML) INJECTION EVERY 8 HOURS
Status: DISCONTINUED | OUTPATIENT
Start: 2019-03-25 | End: 2019-03-25 | Stop reason: HOSPADM

## 2019-03-25 RX ORDER — KETOROLAC TROMETHAMINE 30 MG/ML
15 INJECTION, SOLUTION INTRAMUSCULAR; INTRAVENOUS EVERY 6 HOURS
Status: COMPLETED | OUTPATIENT
Start: 2019-03-25 | End: 2019-03-28

## 2019-03-25 RX ORDER — FENTANYL CITRATE 50 UG/ML
INJECTION, SOLUTION INTRAMUSCULAR; INTRAVENOUS AS NEEDED
Status: DISCONTINUED | OUTPATIENT
Start: 2019-03-25 | End: 2019-03-25 | Stop reason: HOSPADM

## 2019-03-25 RX ORDER — GLYCOPYRROLATE 0.2 MG/ML
INJECTION INTRAMUSCULAR; INTRAVENOUS AS NEEDED
Status: DISCONTINUED | OUTPATIENT
Start: 2019-03-25 | End: 2019-03-25 | Stop reason: HOSPADM

## 2019-03-25 RX ORDER — LABETALOL HYDROCHLORIDE 5 MG/ML
10 INJECTION, SOLUTION INTRAVENOUS ONCE
Status: COMPLETED | OUTPATIENT
Start: 2019-03-25 | End: 2019-03-25

## 2019-03-25 RX ORDER — OXYCODONE AND ACETAMINOPHEN 5; 325 MG/1; MG/1
2 TABLET ORAL
Status: DISCONTINUED | OUTPATIENT
Start: 2019-03-25 | End: 2019-03-28 | Stop reason: HOSPADM

## 2019-03-25 RX ORDER — SODIUM CHLORIDE 9 MG/ML
25 INJECTION, SOLUTION INTRAVENOUS CONTINUOUS
Status: DISCONTINUED | OUTPATIENT
Start: 2019-03-25 | End: 2019-03-25 | Stop reason: HOSPADM

## 2019-03-25 RX ORDER — PREDNISONE 10 MG/1
30 TABLET ORAL
Status: DISCONTINUED | OUTPATIENT
Start: 2019-03-26 | End: 2019-03-28 | Stop reason: HOSPADM

## 2019-03-25 RX ORDER — ONDANSETRON 2 MG/ML
INJECTION INTRAMUSCULAR; INTRAVENOUS AS NEEDED
Status: DISCONTINUED | OUTPATIENT
Start: 2019-03-25 | End: 2019-03-25 | Stop reason: HOSPADM

## 2019-03-25 RX ORDER — MIDAZOLAM HYDROCHLORIDE 1 MG/ML
INJECTION, SOLUTION INTRAMUSCULAR; INTRAVENOUS AS NEEDED
Status: DISCONTINUED | OUTPATIENT
Start: 2019-03-25 | End: 2019-03-25 | Stop reason: HOSPADM

## 2019-03-25 RX ORDER — LIDOCAINE HYDROCHLORIDE 10 MG/ML
0.1 INJECTION, SOLUTION EPIDURAL; INFILTRATION; INTRACAUDAL; PERINEURAL AS NEEDED
Status: DISCONTINUED | OUTPATIENT
Start: 2019-03-25 | End: 2019-03-25 | Stop reason: HOSPADM

## 2019-03-25 RX ORDER — CLONIDINE HYDROCHLORIDE 0.1 MG/1
0.1 TABLET ORAL 2 TIMES DAILY
Status: DISCONTINUED | OUTPATIENT
Start: 2019-03-25 | End: 2019-03-28 | Stop reason: HOSPADM

## 2019-03-25 RX ORDER — ONDANSETRON 2 MG/ML
4 INJECTION INTRAMUSCULAR; INTRAVENOUS AS NEEDED
Status: DISCONTINUED | OUTPATIENT
Start: 2019-03-25 | End: 2019-03-25 | Stop reason: HOSPADM

## 2019-03-25 RX ORDER — MAGNESIUM SULFATE HEPTAHYDRATE 40 MG/ML
INJECTION, SOLUTION INTRAVENOUS AS NEEDED
Status: DISCONTINUED | OUTPATIENT
Start: 2019-03-25 | End: 2019-03-25 | Stop reason: HOSPADM

## 2019-03-25 RX ORDER — IPRATROPIUM BROMIDE AND ALBUTEROL SULFATE 2.5; .5 MG/3ML; MG/3ML
SOLUTION RESPIRATORY (INHALATION)
Status: COMPLETED
Start: 2019-03-25 | End: 2019-03-25

## 2019-03-25 RX ORDER — AMLODIPINE BESYLATE 5 MG/1
10 TABLET ORAL DAILY
Status: DISCONTINUED | OUTPATIENT
Start: 2019-03-26 | End: 2019-03-28 | Stop reason: HOSPADM

## 2019-03-25 RX ORDER — DEXMEDETOMIDINE HYDROCHLORIDE 4 UG/ML
INJECTION, SOLUTION INTRAVENOUS AS NEEDED
Status: DISCONTINUED | OUTPATIENT
Start: 2019-03-25 | End: 2019-03-25 | Stop reason: HOSPADM

## 2019-03-25 RX ORDER — PROPOFOL 10 MG/ML
INJECTION, EMULSION INTRAVENOUS AS NEEDED
Status: DISCONTINUED | OUTPATIENT
Start: 2019-03-25 | End: 2019-03-25 | Stop reason: HOSPADM

## 2019-03-25 RX ORDER — LIDOCAINE HYDROCHLORIDE 20 MG/ML
INJECTION, SOLUTION EPIDURAL; INFILTRATION; INTRACAUDAL; PERINEURAL AS NEEDED
Status: DISCONTINUED | OUTPATIENT
Start: 2019-03-25 | End: 2019-03-25 | Stop reason: HOSPADM

## 2019-03-25 RX ORDER — METFORMIN HYDROCHLORIDE 500 MG/1
1000 TABLET, EXTENDED RELEASE ORAL 2 TIMES DAILY WITH MEALS
Status: DISCONTINUED | OUTPATIENT
Start: 2019-03-25 | End: 2019-03-28 | Stop reason: HOSPADM

## 2019-03-25 RX ORDER — FENTANYL CITRATE 50 UG/ML
25 INJECTION, SOLUTION INTRAMUSCULAR; INTRAVENOUS
Status: COMPLETED | OUTPATIENT
Start: 2019-03-25 | End: 2019-03-25

## 2019-03-25 RX ORDER — ALBUTEROL SULFATE 90 UG/1
2 AEROSOL, METERED RESPIRATORY (INHALATION)
Status: DISCONTINUED | OUTPATIENT
Start: 2019-03-25 | End: 2019-03-25

## 2019-03-25 RX ORDER — SODIUM CHLORIDE 0.9 % (FLUSH) 0.9 %
5-40 SYRINGE (ML) INJECTION AS NEEDED
Status: DISCONTINUED | OUTPATIENT
Start: 2019-03-25 | End: 2019-03-25 | Stop reason: HOSPADM

## 2019-03-25 RX ORDER — ROCURONIUM BROMIDE 10 MG/ML
INJECTION, SOLUTION INTRAVENOUS AS NEEDED
Status: DISCONTINUED | OUTPATIENT
Start: 2019-03-25 | End: 2019-03-25 | Stop reason: HOSPADM

## 2019-03-25 RX ORDER — GUAIFENESIN 100 MG/5ML
81 LIQUID (ML) ORAL DAILY
Status: DISCONTINUED | OUTPATIENT
Start: 2019-03-26 | End: 2019-03-28 | Stop reason: HOSPADM

## 2019-03-25 RX ORDER — NALOXONE HYDROCHLORIDE 0.4 MG/ML
0.4 INJECTION, SOLUTION INTRAMUSCULAR; INTRAVENOUS; SUBCUTANEOUS AS NEEDED
Status: DISCONTINUED | OUTPATIENT
Start: 2019-03-25 | End: 2019-03-28 | Stop reason: HOSPADM

## 2019-03-25 RX ORDER — SODIUM CHLORIDE, SODIUM LACTATE, POTASSIUM CHLORIDE, CALCIUM CHLORIDE 600; 310; 30; 20 MG/100ML; MG/100ML; MG/100ML; MG/100ML
125 INJECTION, SOLUTION INTRAVENOUS CONTINUOUS
Status: DISCONTINUED | OUTPATIENT
Start: 2019-03-25 | End: 2019-03-25 | Stop reason: HOSPADM

## 2019-03-25 RX ORDER — SODIUM CHLORIDE, SODIUM LACTATE, POTASSIUM CHLORIDE, CALCIUM CHLORIDE 600; 310; 30; 20 MG/100ML; MG/100ML; MG/100ML; MG/100ML
INJECTION, SOLUTION INTRAVENOUS
Status: DISCONTINUED | OUTPATIENT
Start: 2019-03-25 | End: 2019-03-25 | Stop reason: HOSPADM

## 2019-03-25 RX ORDER — SODIUM CHLORIDE 0.9 % (FLUSH) 0.9 %
5-40 SYRINGE (ML) INJECTION EVERY 8 HOURS
Status: DISCONTINUED | OUTPATIENT
Start: 2019-03-25 | End: 2019-03-28 | Stop reason: HOSPADM

## 2019-03-25 RX ORDER — ENOXAPARIN SODIUM 100 MG/ML
40 INJECTION SUBCUTANEOUS EVERY 24 HOURS
Status: DISCONTINUED | OUTPATIENT
Start: 2019-03-26 | End: 2019-03-28 | Stop reason: HOSPADM

## 2019-03-25 RX ORDER — HYDROMORPHONE HYDROCHLORIDE 1 MG/ML
0.2 INJECTION, SOLUTION INTRAMUSCULAR; INTRAVENOUS; SUBCUTANEOUS
Status: COMPLETED | OUTPATIENT
Start: 2019-03-25 | End: 2019-03-25

## 2019-03-25 RX ORDER — DIPHENHYDRAMINE HYDROCHLORIDE 50 MG/ML
12.5 INJECTION, SOLUTION INTRAMUSCULAR; INTRAVENOUS AS NEEDED
Status: DISCONTINUED | OUTPATIENT
Start: 2019-03-25 | End: 2019-03-25 | Stop reason: HOSPADM

## 2019-03-25 RX ORDER — REPAGLINIDE 2 MG/1
1 TABLET ORAL
Status: DISCONTINUED | OUTPATIENT
Start: 2019-03-25 | End: 2019-03-28 | Stop reason: HOSPADM

## 2019-03-25 RX ORDER — CEFAZOLIN SODIUM/WATER 2 G/20 ML
2 SYRINGE (ML) INTRAVENOUS ONCE
Status: DISCONTINUED | OUTPATIENT
Start: 2019-03-25 | End: 2019-03-25 | Stop reason: DRUGHIGH

## 2019-03-25 RX ORDER — IPRATROPIUM BROMIDE AND ALBUTEROL SULFATE 2.5; .5 MG/3ML; MG/3ML
3 SOLUTION RESPIRATORY (INHALATION)
Status: DISCONTINUED | OUTPATIENT
Start: 2019-03-25 | End: 2019-03-28 | Stop reason: HOSPADM

## 2019-03-25 RX ORDER — OXYCODONE AND ACETAMINOPHEN 5; 325 MG/1; MG/1
1 TABLET ORAL AS NEEDED
Status: DISCONTINUED | OUTPATIENT
Start: 2019-03-25 | End: 2019-03-25 | Stop reason: HOSPADM

## 2019-03-25 RX ORDER — SUCCINYLCHOLINE CHLORIDE 20 MG/ML
INJECTION INTRAMUSCULAR; INTRAVENOUS AS NEEDED
Status: DISCONTINUED | OUTPATIENT
Start: 2019-03-25 | End: 2019-03-25 | Stop reason: HOSPADM

## 2019-03-25 RX ORDER — ESMOLOL HYDROCHLORIDE 10 MG/ML
INJECTION INTRAVENOUS AS NEEDED
Status: DISCONTINUED | OUTPATIENT
Start: 2019-03-25 | End: 2019-03-25 | Stop reason: HOSPADM

## 2019-03-25 RX ORDER — DILTIAZEM HYDROCHLORIDE 240 MG/1
240 CAPSULE, COATED, EXTENDED RELEASE ORAL DAILY
Status: DISCONTINUED | OUTPATIENT
Start: 2019-03-26 | End: 2019-03-25

## 2019-03-25 RX ORDER — ONDANSETRON 2 MG/ML
4 INJECTION INTRAMUSCULAR; INTRAVENOUS
Status: DISCONTINUED | OUTPATIENT
Start: 2019-03-25 | End: 2019-03-28 | Stop reason: HOSPADM

## 2019-03-25 RX ORDER — AMOXICILLIN 250 MG
1 CAPSULE ORAL DAILY
Status: DISCONTINUED | OUTPATIENT
Start: 2019-03-26 | End: 2019-03-28 | Stop reason: HOSPADM

## 2019-03-25 RX ORDER — MORPHINE SULFATE 10 MG/ML
2 INJECTION, SOLUTION INTRAMUSCULAR; INTRAVENOUS
Status: DISCONTINUED | OUTPATIENT
Start: 2019-03-25 | End: 2019-03-25 | Stop reason: HOSPADM

## 2019-03-25 RX ORDER — HYDROMORPHONE HYDROCHLORIDE 1 MG/ML
2 INJECTION, SOLUTION INTRAMUSCULAR; INTRAVENOUS; SUBCUTANEOUS
Status: DISCONTINUED | OUTPATIENT
Start: 2019-03-25 | End: 2019-03-28 | Stop reason: HOSPADM

## 2019-03-25 RX ORDER — KETOTIFEN FUMARATE 0.35 MG/ML
1 SOLUTION/ DROPS OPHTHALMIC 2 TIMES DAILY
Status: DISCONTINUED | OUTPATIENT
Start: 2019-03-25 | End: 2019-03-28 | Stop reason: HOSPADM

## 2019-03-25 RX ORDER — NEOSTIGMINE METHYLSULFATE 1 MG/ML
INJECTION INTRAVENOUS AS NEEDED
Status: DISCONTINUED | OUTPATIENT
Start: 2019-03-25 | End: 2019-03-25 | Stop reason: HOSPADM

## 2019-03-25 RX ORDER — ACETAMINOPHEN 10 MG/ML
INJECTION, SOLUTION INTRAVENOUS AS NEEDED
Status: DISCONTINUED | OUTPATIENT
Start: 2019-03-25 | End: 2019-03-25 | Stop reason: HOSPADM

## 2019-03-25 RX ORDER — FENTANYL CITRATE 50 UG/ML
50 INJECTION, SOLUTION INTRAMUSCULAR; INTRAVENOUS AS NEEDED
Status: DISCONTINUED | OUTPATIENT
Start: 2019-03-25 | End: 2019-03-25 | Stop reason: HOSPADM

## 2019-03-25 RX ORDER — SULFAMETHOXAZOLE AND TRIMETHOPRIM 800; 160 MG/1; MG/1
1 TABLET ORAL
Status: DISCONTINUED | OUTPATIENT
Start: 2019-03-25 | End: 2019-03-28 | Stop reason: HOSPADM

## 2019-03-25 RX ORDER — HYDROMORPHONE HYDROCHLORIDE 1 MG/ML
INJECTION, SOLUTION INTRAMUSCULAR; INTRAVENOUS; SUBCUTANEOUS AS NEEDED
Status: DISCONTINUED | OUTPATIENT
Start: 2019-03-25 | End: 2019-03-25 | Stop reason: HOSPADM

## 2019-03-25 RX ORDER — LISINOPRIL 20 MG/1
20 TABLET ORAL DAILY
Status: DISCONTINUED | OUTPATIENT
Start: 2019-03-26 | End: 2019-03-28 | Stop reason: HOSPADM

## 2019-03-25 RX ADMIN — IPRATROPIUM BROMIDE AND ALBUTEROL SULFATE 3 ML: .5; 3 SOLUTION RESPIRATORY (INHALATION) at 13:44

## 2019-03-25 RX ADMIN — KETOROLAC TROMETHAMINE 15 MG: 30 INJECTION, SOLUTION INTRAMUSCULAR; INTRAVENOUS at 13:25

## 2019-03-25 RX ADMIN — MORPHINE SULFATE 2 MG: 10 INJECTION, SOLUTION INTRAMUSCULAR; INTRAVENOUS at 11:01

## 2019-03-25 RX ADMIN — PROPOFOL 50 MG: 10 INJECTION, EMULSION INTRAVENOUS at 07:45

## 2019-03-25 RX ADMIN — CEFAZOLIN 3 G: 1 INJECTION, POWDER, FOR SOLUTION INTRAMUSCULAR; INTRAVENOUS; PARENTERAL at 08:17

## 2019-03-25 RX ADMIN — ACETAMINOPHEN 1000 MG: 10 INJECTION, SOLUTION INTRAVENOUS at 08:22

## 2019-03-25 RX ADMIN — FENTANYL CITRATE 25 MCG: 50 INJECTION INTRAMUSCULAR; INTRAVENOUS at 10:41

## 2019-03-25 RX ADMIN — MORPHINE SULFATE 2 MG: 10 INJECTION, SOLUTION INTRAMUSCULAR; INTRAVENOUS at 10:15

## 2019-03-25 RX ADMIN — MORPHINE SULFATE 2 MG: 10 INJECTION, SOLUTION INTRAMUSCULAR; INTRAVENOUS at 10:40

## 2019-03-25 RX ADMIN — PROPOFOL 100 MG: 10 INJECTION, EMULSION INTRAVENOUS at 07:49

## 2019-03-25 RX ADMIN — SODIUM CHLORIDE, SODIUM LACTATE, POTASSIUM CHLORIDE, CALCIUM CHLORIDE: 600; 310; 30; 20 INJECTION, SOLUTION INTRAVENOUS at 07:26

## 2019-03-25 RX ADMIN — LABETALOL 20 MG/4 ML (5 MG/ML) INTRAVENOUS SYRINGE 10 MG: at 10:39

## 2019-03-25 RX ADMIN — HYDROMORPHONE HYDROCHLORIDE 0.6 MG: 1 INJECTION, SOLUTION INTRAMUSCULAR; INTRAVENOUS; SUBCUTANEOUS at 09:54

## 2019-03-25 RX ADMIN — MIDAZOLAM HYDROCHLORIDE 2 MG: 1 INJECTION, SOLUTION INTRAMUSCULAR; INTRAVENOUS at 07:26

## 2019-03-25 RX ADMIN — Medication 10 ML: at 12:05

## 2019-03-25 RX ADMIN — PROPOFOL 100 MG: 10 INJECTION, EMULSION INTRAVENOUS at 09:11

## 2019-03-25 RX ADMIN — SUCCINYLCHOLINE CHLORIDE 200 MG: 20 INJECTION INTRAMUSCULAR; INTRAVENOUS at 07:40

## 2019-03-25 RX ADMIN — IPRATROPIUM BROMIDE AND ALBUTEROL SULFATE 3 ML: .5; 3 SOLUTION RESPIRATORY (INHALATION) at 11:39

## 2019-03-25 RX ADMIN — MORPHINE SULFATE 2 MG: 10 INJECTION, SOLUTION INTRAMUSCULAR; INTRAVENOUS at 10:54

## 2019-03-25 RX ADMIN — FENTANYL CITRATE 25 MCG: 50 INJECTION INTRAMUSCULAR; INTRAVENOUS at 10:31

## 2019-03-25 RX ADMIN — DEXMEDETOMIDINE HYDROCHLORIDE 12 MCG: 4 INJECTION, SOLUTION INTRAVENOUS at 08:16

## 2019-03-25 RX ADMIN — HYDROMORPHONE HYDROCHLORIDE 0.6 MG: 1 INJECTION, SOLUTION INTRAMUSCULAR; INTRAVENOUS; SUBCUTANEOUS at 09:42

## 2019-03-25 RX ADMIN — SULFAMETHOXAZOLE AND TRIMETHOPRIM 1 TABLET: 800; 160 TABLET ORAL at 21:22

## 2019-03-25 RX ADMIN — LABETALOL HYDROCHLORIDE 10 MG: 5 INJECTION, SOLUTION INTRAVENOUS at 10:39

## 2019-03-25 RX ADMIN — ESMOLOL HYDROCHLORIDE 20 MG: 10 INJECTION INTRAVENOUS at 08:33

## 2019-03-25 RX ADMIN — MIDAZOLAM 0.5 MG: 1 INJECTION INTRAMUSCULAR; INTRAVENOUS at 10:28

## 2019-03-25 RX ADMIN — HYDROMORPHONE HYDROCHLORIDE 0.4 MG: 1 INJECTION, SOLUTION INTRAMUSCULAR; INTRAVENOUS; SUBCUTANEOUS at 09:51

## 2019-03-25 RX ADMIN — FENTANYL CITRATE 25 MCG: 50 INJECTION INTRAMUSCULAR; INTRAVENOUS at 10:52

## 2019-03-25 RX ADMIN — Medication 10 ML: at 13:25

## 2019-03-25 RX ADMIN — HYDROMORPHONE HYDROCHLORIDE 0.2 MG: 1 INJECTION, SOLUTION INTRAMUSCULAR; INTRAVENOUS; SUBCUTANEOUS at 12:00

## 2019-03-25 RX ADMIN — NEOSTIGMINE METHYLSULFATE 4 MG: 1 INJECTION INTRAVENOUS at 09:07

## 2019-03-25 RX ADMIN — CLONIDINE HYDROCHLORIDE 0.1 MG: 0.1 TABLET ORAL at 17:48

## 2019-03-25 RX ADMIN — GLYCOPYRROLATE 0.4 MG: 0.2 INJECTION INTRAMUSCULAR; INTRAVENOUS at 09:07

## 2019-03-25 RX ADMIN — PROPOFOL 200 MG: 10 INJECTION, EMULSION INTRAVENOUS at 07:40

## 2019-03-25 RX ADMIN — KETOTIFEN FUMARATE 1 DROP: 0.35 SOLUTION/ DROPS OPHTHALMIC at 17:47

## 2019-03-25 RX ADMIN — FENTANYL CITRATE 100 MCG: 50 INJECTION, SOLUTION INTRAMUSCULAR; INTRAVENOUS at 07:40

## 2019-03-25 RX ADMIN — MORPHINE SULFATE 2 MG: 10 INJECTION, SOLUTION INTRAMUSCULAR; INTRAVENOUS at 10:20

## 2019-03-25 RX ADMIN — ONDANSETRON 4 MG: 2 INJECTION INTRAMUSCULAR; INTRAVENOUS at 10:06

## 2019-03-25 RX ADMIN — Medication 10 ML: at 21:22

## 2019-03-25 RX ADMIN — ROCURONIUM BROMIDE 20 MG: 10 INJECTION, SOLUTION INTRAVENOUS at 08:01

## 2019-03-25 RX ADMIN — ROCURONIUM BROMIDE 10 MG: 10 INJECTION, SOLUTION INTRAVENOUS at 07:40

## 2019-03-25 RX ADMIN — HYDROMORPHONE HYDROCHLORIDE 0.4 MG: 1 INJECTION, SOLUTION INTRAMUSCULAR; INTRAVENOUS; SUBCUTANEOUS at 09:47

## 2019-03-25 RX ADMIN — HYDROMORPHONE HYDROCHLORIDE 0.2 MG: 1 INJECTION, SOLUTION INTRAMUSCULAR; INTRAVENOUS; SUBCUTANEOUS at 11:45

## 2019-03-25 RX ADMIN — KETOROLAC TROMETHAMINE 15 MG: 30 INJECTION, SOLUTION INTRAMUSCULAR; INTRAVENOUS at 23:51

## 2019-03-25 RX ADMIN — LIDOCAINE HYDROCHLORIDE 100 MG: 20 INJECTION, SOLUTION EPIDURAL; INFILTRATION; INTRACAUDAL; PERINEURAL at 07:40

## 2019-03-25 RX ADMIN — ONDANSETRON 4 MG: 2 INJECTION INTRAMUSCULAR; INTRAVENOUS at 09:24

## 2019-03-25 RX ADMIN — METFORMIN HYDROCHLORIDE 1000 MG: 500 TABLET, EXTENDED RELEASE ORAL at 16:18

## 2019-03-25 RX ADMIN — ESMOLOL HYDROCHLORIDE 20 MG: 10 INJECTION INTRAVENOUS at 08:27

## 2019-03-25 RX ADMIN — ERGOCALCIFEROL 50000 UNITS: 1.25 CAPSULE ORAL at 16:18

## 2019-03-25 RX ADMIN — PROPOFOL 50 MG: 10 INJECTION, EMULSION INTRAVENOUS at 08:26

## 2019-03-25 RX ADMIN — FENTANYL CITRATE 25 MCG: 50 INJECTION INTRAMUSCULAR; INTRAVENOUS at 10:11

## 2019-03-25 RX ADMIN — REPAGLINIDE 1 MG: 2 TABLET ORAL at 16:19

## 2019-03-25 RX ADMIN — MAGNESIUM SULFATE HEPTAHYDRATE 2 G: 40 INJECTION, SOLUTION INTRAVENOUS at 08:28

## 2019-03-25 RX ADMIN — KETOROLAC TROMETHAMINE 15 MG: 30 INJECTION, SOLUTION INTRAMUSCULAR; INTRAVENOUS at 17:47

## 2019-03-25 RX ADMIN — SUCCINYLCHOLINE CHLORIDE 100 MG: 20 INJECTION INTRAMUSCULAR; INTRAVENOUS at 07:49

## 2019-03-25 RX ADMIN — CLONIDINE HYDROCHLORIDE 0.1 MG: 0.1 TABLET ORAL at 13:25

## 2019-03-25 RX ADMIN — MIDAZOLAM 0.5 MG: 1 INJECTION INTRAMUSCULAR; INTRAVENOUS at 11:00

## 2019-03-25 RX ADMIN — OXYCODONE AND ACETAMINOPHEN 2 TABLET: 5; 325 TABLET ORAL at 22:09

## 2019-03-25 NOTE — PERIOP NOTES
TRANSFER - OUT REPORT:    Verbal report given to radha on Chester Button  being transferred to 441(unit) for routine post - op       Report consisted of patients Situation, Background, Assessment and   Recommendations(SBAR). Time Pre op antibiotic given: 3 grams ancef at 0817am  Anesthesia Stop time: 9545  Wooten Present on Transfer to floor:no  Order for Wooten on Chart:no  Discharge Prescriptions with Chart:no    Information from the following report(s) SBAR, OR Summary, Procedure Summary, Intake/Output, MAR, Recent Results and Cardiac Rhythm sinus tach was reviewed with the receiving nurse. Opportunity for questions and clarification was provided. Is the patient on 02? YES       L/Min 3       Other     Is the patient on a monitor? YES    Is the nurse transporting with the patient? YES    Surgical Waiting Area notified of patient's transfer from PACU? YES      The following personal items collected during your admission accompanied patient upon transfer:   Dental Appliance: Dental Appliances: None  Vision:    Hearing Aid:    Jewelry: Jewelry: None  Clothing: Clothing: Footwear, Shirt, Pants(clothes to [employer])  Other Valuables:  Other Valuables: None  Valuables sent to safe:      Clothing bag x 1 on bed

## 2019-03-25 NOTE — PERIOP NOTES
Family called to inform of patient's progress, no one available at time of call, left message with volunteer. Normal saline and sterile water to field.

## 2019-03-25 NOTE — PROGRESS NOTES
1300 - patient on unit from pacu. .  CT. Patient alert, oriented. Drowsy. - Primary Nurse Gabriela Estrada RN and Oxana Valencia RN performed a dual skin assessment on this patient Impairment noted- see wound doc flow sheet  Chintan score is 21    1940 - Bedside and Verbal shift change report given to chung (oncoming nurse) by Vanessa Capps (offgoing nurse).  Report included the following information SBAR, Kardex, Procedure Summary, Intake/Output, MAR, Recent Results and Cardiac Rhythm ST.

## 2019-03-25 NOTE — ROUTINE PROCESS
TRANSFER - IN REPORT:    Verbal report received from Anai(name) on Akash Romano  being received from [facility]) for routine post - op      Report consisted of patients Situation, Background, Assessment and   Recommendations(SBAR). Information from the following report(s) SBAR, Kardex, Intake/Output, MAR, Recent Results and Cardiac Rhythm ST was reviewed with the receiving nurse. Opportunity for questions and clarification was provided. Assessment completed upon patients arrival to unit and care assumed.

## 2019-03-25 NOTE — H&P
Asked to see Mrs Katey Dorsey re: possible lung biopsy     Referred by Dr. Tolu Bell     Mrs Katey Dorsey is a pleasant 47 y/o lady with a past medical history significant for morbid obesity, HTN and asthma. For the past year or so she has been evaluated and treated by Pulmonary medicine for increasing dyspnea as well a radiographic changes. She has been treated with Antibiotics and steroids. She feels like she hasn't gotten any better and her wheezing and dyspnea are getting worse.   Denies pleuritic chest pain.      No Known Allergies     PMHx: obesity, HTN, HLD, DMII, asthma     PSHx: hysterectomy, Lap chas     SocHx: non smoker           Family History   Problem Relation Age of Onset    Other Mother           CAD    Hypertension Mother      Hypertension Father      Kidney Disease Father           End Stage Renal, Dialysis    Hypertension Brother      Diabetes Paternal Uncle      Other Maternal Grandmother           CAD    Heart Attack Maternal Grandmother      Cancer Maternal Grandfather           Throat Cancer    Dementia Paternal Grandmother           Alzheimer's Dementia    Cancer Paternal Grandfather           Colon Cancer    Breast Cancer Maternal Aunt 62         mothers 1/2 sister    Diabetes Paternal Uncle                  Outpatient Medications Marked as Taking for the 2/12/19 encounter (Office Visit) with Tram Mcdermott MD   Medication Sig Dispense Refill    cloNIDine HCl (CATAPRES) 0.1 mg tablet TAKE 1 TABLET EVERY 8 HOURS ORALLY   1    DILT- mg XR capsule TAKE 1 CAPSULE BY MOUTH EVERY DAY ON EMPTY STOMACH IN THE MORNING   2    enalapril (VASOTEC) 20 mg tablet TAKE 1 TABLET BY MOUTH EVERY DAY   1    ergocalciferol (ERGOCALCIFEROL) 50,000 unit capsule TAKE 1 CAPSULE BY MOUTH ONCE A WEEK   0    hydrALAZINE (APRESOLINE) 10 mg tablet          albuterol-ipratropium (DUO-NEB) 2.5 mg-0.5 mg/3 ml nebu USE 1 VIAL VIA NEBULIZER EVERY 6 HOURS   5    metFORMIN ER (GLUCOPHAGE XR) 500 mg tablet TAKE 2 TABLETS BY MOUTH TWICE A DAY   2    repaglinide (PRANDIN) 1 mg tablet TAKE 1 TABLET BY MOUTH TWICE A DAY 15-30 MINUTES BEFORE MEALS   2    trimethoprim-sulfamethoxazole (BACTRIM DS, SEPTRA DS) 160-800 mg per tablet TAKE 1 TABLET BY MOUTH 3 TIMES A WEEK, MONDAY, WEDNESDAY, & FRIDAY   2    predniSONE (DELTASONE) 10 mg tablet TAKE 3 TABLETS BY MOUTH DAILY   2    amLODIPine (NORVASC) 5 mg tablet Take 1 Tab by mouth daily. 30 Tab 0    olopatadine (PAZEO) 0.7 % drop Administer 1 Drop to both eyes daily.        therapeutic multivitamin (THERAGRAN) tablet Take 1 Tab by mouth daily.        raNITIdine (ZANTAC) 150 mg tablet Take 150 mg by mouth daily as needed. Indications: gastroesophageal reflux disease        albuterol (PROVENTIL HFA, VENTOLIN HFA) 90 mcg/actuation inhaler Take 2 Puffs by inhalation every six (6) hours as needed for Wheezing. 1 Inhaler 1         ROS:     Constitutional- endorses weight gain from steroids  HEENT- denies dysphagia  Neuro- denies syncope  Optho- no recent changes in vision  Resp- dyspnea and wheezing  CV- denies angina or palpitations  GI- denies abd pain  - no complaints  ID- denies recent fevers  Vasc- denies claudication  MSK joint pain     Blood pressure (!) 157/107, pulse (!) 120, resp. rate 20, height 5' (1.524 m), weight 278 lb (126.1 kg), SpO2 96 %.     On exam she is seated upright  Alert and oriented  obese  \"breathy\" speech, normal affect  Not tachypneic  No goiter  Breath sounds faint wheezes bilaterally  Rrr, no murmurs  Radial pulses palp b/l  Abd SNTND, no organomegaly  Mild pedal edema        ==============================     Cr 0.82     ==============================     I have personally reviewed her Chest CT. There is a non specific consolidative appearance of the lung and with septal thickening and ground glass opacities. The left lung appears to be more affected than the right.   Some associated lymphadenoapthy     ==============================     PFTs-  FeV1  102%  DLCO  52%     ==============================     Diagnoses  1:  Cryptogenic organizing pneumonia  2: morbid obesity     =============================     Mrs. Sedrick Santana has increasing dyspnea and increasing limitations to her ability to carry out routine tasks. She is no longer as mobile or active as she used to be. Certainly her radiographic findings do support an element of pulmonary disease. From a purely pulmonary standpoint I do think that a lung biopsy would help diagnose what is going on in her lungs and help guide treatment. I would recommend a left VATS lung biopsy.     From an overall health and respiratory standpoint I think her dyspnea and limitations to her daily activities are multifactorial.  I had a franco discussion with her that her morbid obesity was also greatly contributing to her dyspnea and respiratory issues.     She is a moderate to high surgical risk based on her obesity and BMI>50. Despite the known mere-operative risks, and knowing that a Lung biopsy and subsequent diagnosis/treatment may not make her a lot better, she would like to proceed so that she at least has peace of mind about knowing what is wrong with her lungs.      Will post for MArch per patient request.  I would also like to discuss her case again with Dr. Maykel Escobar. Date of Surgery Update:  Georgie Kiser was seen and examined. History and physical has been reviewed. The patient has been examined. There have been no significant clinical changes since the completion of the originally dated History and Physical.  Patient identified by surgeon; surgical site was confirmed by patient and surgeon.     Signed By: Matilde Don MD     March 25, 2019 6:38 AM

## 2019-03-25 NOTE — ANESTHESIA PREPROCEDURE EVALUATION
Anesthetic History   No history of anesthetic complications            Review of Systems / Medical History  Patient summary reviewed, nursing notes reviewed and pertinent labs reviewed    Pulmonary        Sleep apnea    Asthma        Neuro/Psych   Within defined limits           Cardiovascular  Within defined limits  Hypertension                   GI/Hepatic/Renal     GERD           Endo/Other  Within defined limits  Diabetes    Morbid obesity     Other Findings              Physical Exam    Airway  Mallampati: III  TM Distance: > 6 cm  Neck ROM: normal range of motion   Mouth opening: Normal     Cardiovascular  Regular rate and rhythm,  S1 and S2 normal,  no murmur, click, rub, or gallop             Dental  No notable dental hx       Pulmonary  Breath sounds clear to auscultation               Abdominal  GI exam deferred       Other Findings            Anesthetic Plan    ASA: 3  Anesthesia type: general          Induction: Intravenous  Anesthetic plan and risks discussed with: Patient

## 2019-03-25 NOTE — PROGRESS NOTES
Clinical Care Lead Arrival Summary        Name: Cindy Gonzalez  MRN: [id number]  Date: 3/25/2019 12:41 PM  Admission Date: 3/25/2019  : 1965  Situation:  3/25/19 Left VATS, Left mini thoracotomy, lung biopsy x 2 by dr. Jamar Holbrook    Background:     Medical History      Past Medical History Date Comments   Arrhythmia [I49.9]  tachycardia ([facility])   Asthma [X25.102] 2013 inhaler used   Diabetes ([facility] [address] [E11.9]  pre diabetic per patient    Dyslipidemia [E78.5]     GERD (gastroesophageal reflux disease) [K21.9]     Hypertension [I10]     Obesity [E66.9] 2014    JENNIFER on CPAP [G47.33, Z99.89]     Pneumonia [J18.9] 10/2018    RA (rheumatoid arthritis) ([facility] [address] [M06.9]  KNEES   Sepsis ([facility] [address] [A41.9] 10/2018       Surgical History      Past Surgical History Laterality Date Comments   ENDOSCOPY, COLON, DIAGNOSTIC [GI6]      HX CHOLECYSTECTOMY [facility]  2006    HX HYSTERECTOMY [address] complete, endometriosis.     HX  SECTION [SHX87]   x2   IR BRONCHOSCOPY [XZM9279]  2018             The Beta blocker the patient is taking is [unfilled], none        STAND: if needed  Voice quality/secretions:    Hx of dysphagia:    O2 sat:    Alertness:      Flu vaccination received for current season:  Received Flu Vaccine for Current Season (usually Sept-March): Yes    VTE Documentation  VTE Risk Assessment VTE Risk Assessment: Age >40  Mechanical VTE orders: Mechanical VTE Orders: Yes  Venous Foot Pump:    Graduated Compression Stockings:    Sequential Compression Devices: Sequential Compression Device: Bilateral  Patient Refused VTE:        Diet: No diet orders on file              Assessment:    Lines/Drains/Airways:   Peripheral IV 19 Right Hand (Active)   Site Assessment Clean, dry, & intact 3/25/2019 10:59 AM   Phlebitis Assessment 0 3/25/2019 10:59 AM   Infiltration Assessment 0 3/25/2019 10:59 AM   Dressing Status Clean, dry, & intact 3/25/2019 10:59 AM   Dressing Type Transparent 3/25/2019 10:59 AM   Hub Color/Line Status Capped 3/25/2019 10:59 AM   Action Taken Open ports on tubing capped 3/25/2019  9:40 AM   Alcohol Cap Used Yes 3/25/2019  9:40 AM       Peripheral IV 03/25/19 Anterior; Left Forearm (Active)   Site Assessment Clean, dry, & intact 3/25/2019 10:59 AM   Phlebitis Assessment 0 3/25/2019 10:59 AM   Infiltration Assessment 0 3/25/2019 10:59 AM   Dressing Status Clean, dry, & intact 3/25/2019 10:59 AM   Dressing Type Transparent 3/25/2019 10:59 AM   Hub Color/Line Status Infusing 3/25/2019 10:59 AM   Action Taken Open ports on tubing capped 3/25/2019  9:40 AM   Alcohol Cap Used Yes 3/25/2019  9:40 AM       Chest Tube #1 03/25/19 Straight;Left (Active)   Chest Tube Dressing Dry 3/25/2019 10:59 AM   Chest Tube Airleak No 3/25/2019 10:59 AM   Drainage Description Serosanguinous 3/25/2019  9:40 AM   Status Gravity 3/25/2019 10:59 AM   Y Connector Used No 3/25/2019 10:59 AM   Drainage Chamber Level (ml) 40 ml 3/25/2019 11:00 AM   Output (ml) 20 ml 3/25/2019  9:40 AM       Wound Chest Left (Active)   Dressing Status  Clean, dry, and intact 3/25/2019 12:07 PM   Dressing Type  Special tape (comment) 3/25/2019 12:07 PM       Last 3 Weights:  Last 3 Recorded Weights in this Encounter    03/25/19 0620   Weight: 140.6 kg (310 lb)     DAILY STANDING WEIGHTS BETWEEN 12 MIDNIGHT AND 0700 WHILE ON TELEMETRY  STRICT IN TAKE AND OUTPUT-DOCUMENT EVERY 4 HOURS    Recommendations:POST VATS PATHWAY  Consult Orders:   Recent orders:  INITIAL PHYSICIAN ORDER: INPATIENT  FULL CODE  APPLY/MAINTAIN SEQUENTIAL COMPRESSION DEVICE  NOTIFY ANESTHESIA  APPLY/MAINTAIN SEQUENTIAL COMPRESSION DEVICE  XR CHEST PORT  TRANSFER PATIENT  POC GLUCOSE  VITAL SIGNS PER UNIT ROUTINE  UP AD TERRY  AMBULATE WITH ASSISTANCE  OUT OF BED IN CHAIR  CHEST TUBE CARE / SUCTION  INCENTIVE SPIROMETRY    Core Measures Compliant   CHF:NA   Pneumonia:NA   Vaccines:SCREEN   SCIP:YES   Wooten:NO-UP TO WALK AND VOID TODAY   AMI:NA

## 2019-03-25 NOTE — OP NOTES
[address]  OPERATIVE REPORT    Name:  Ivet Joiner  MR#:  [id number]  :  1965  ACCOUNT #:  [de-identified]  DATE OF SERVICE:  2019    CLINICAL SERVICE:  Thoracic Surgery    ATTENDING SURGEON:  Jass Kumar MD    PROCEDURES PERFORMED:  1. Left video-assisted thoracoscopy. 2.  Left mini thoracotomy. 3.  Lung biopsy x2 (left lingula and left lower lobe). PREOPERATIVE DIAGNOSES:  1. Cryptogenic pneumonia. 2.  Morbid obesity. POSTOPERATIVE DIAGNOSES:  1. Cryptogenic pneumonia. 2.  Morbid obesity. ASSISTANT:  DAYANNA Winston    SPECIMENS REMOVED:  1. Left lingular wedge biopsy was sent for microbiology and anatomic pathology. 2.  Left lower lobe wedge biopsy was sent for microbiology and anatomic pathology. DRAINS AND TUBES:  One 28-Azeri chest tube was left within the left hemithorax. ANESTHESIA:  General with double-lumen endotracheal intubation. ESTIMATED BLOOD LOSS:  For this case was less than 25 mL. INDICATIONS FOR PROCEDURE:  The patient is a 45-year-old lady referred from Pulmonary for abnormal chest CT. She has had increasing dyspnea over the past year or so, has been treated multiple times for pneumonia. Workup including bronchoscopy has been negative and she was referred for surgical lung biopsy. PROCEDURE:  After informed consent was obtained and placed on the chart, the patient was taken to the operating room, placed supine on the operating table. General anesthesia with double-lumen endotracheal intubation was induced. She had a very, very difficult intubation due to her body habitus. Preop antibiotics were administered. The patient was then placed in the right lateral decubitus position left side up. The patient's left chest was prepped and draped in sterile fashion. Time-out was performed.     On evaluating her left chest wall due to her body habitus, typical port placement was not going to the easy, and the decision was made to perform a low lateral minithoracotomy. Less than 5 cm incision was then made probably above the sixth intercostal space. Dissection was carried down through the subcutaneous tissue. Latissimus was divided. Access was gained to the thoracic cavity. The thoracoscope was then introduced. The lungs were inspected. There were no obvious lesions of the pleura or the lung. The lingula was then mobilized and using Endo-CHERRI stapler, a lingular wedge biopsy was performed. Half was sent to anatomic pathology and half was sent to microbiology. The lower lobe was then mobilized, and again using Endo-CHERRI stapler, a lower lobe wedge biopsy was performed, half to micro and half to anatomic pathology. Meticulous hemostasis was ensured. Approximately 60 mL of 1% lidocaine mixed with 0.25% Marcaine was used as a local anesthetic to perform intercostal nerve block. A 28-Occitan chest tube was placed posteriorly. The lung was re-inflated under direct visualization. The ribs were re-approximated using one pericostal suture. Latissimus was re-approximated using 0 Vicryl suture. Subcutaneous tissue closed in a multilayered fashion and skin closed with subcuticular stitch and Dermabond. The patient was then reversed from general anesthesia, extubated, and taken to the PACU in stable condition. All surgical counts were correct x2 at the end of this case. There were no immediate complications identified during this case. Dr. Belkis Curtis was present and scrubbed throughout the entire procedure. Kushal ALAN was instrumental in completion of this procedure, assisting with opening and closing of all incisions, performance of the camera, and operating the Endo-CHERRI stapler.         Delroy Rivera MD      [id number]  D:  03/25/2019 9:59  T:  03/25/2019 10:02  JOB #:  [id number]  CC:  Randa Abdi MD

## 2019-03-25 NOTE — BRIEF OP NOTE
BRIEF OPERATIVE NOTE    Date of Procedure: 3/25/2019   Preoperative Diagnosis: 1: CRYPTOGENIC PNEUMONIA 2: morbid obesity  Postoperative Diagnosis: same   Procedure(s):  LEFT VATS, LT MINI THOROCOTOMY,  LUNG BIOPSY X 2  Surgeon(s) and Role:     Fabiola Chamorro MD - Primary         Surgical Assistant: none    Surgical Staff:  Fran Scottling: Bob Pelayo RN  Circ-Intern: Elinor Frazier RN  Physician Assistant: DAYANNA Cortés Sa  Scrub Tech-1: [address], [address] Staff: Fabiola Martinez RN  Event Time In Time Out   Incision Start 9983    Incision Close 0483      Anesthesia: General   Estimated Blood Loss: <25ml  Specimens:   ID Type Source Tests Collected by Time Destination   1 : left lingular bx Tissue LINGULAR BIOPSY  Melissa Quesada MD 3/25/2019 0740 Pathology   2 : left lower lobe bx Fresh Lung, Lower Lobe  Melissa Quesada MD 3/25/2019 5097 Pathology   1 : left lingular bx Tissue LINGULAR BIOPSY CULTURE, ANAEROBIC, CULTURE, TISSUE W GRAM STAIN, CULTURE, FUNGUS, CULTURE & SMEAR, AFB Melissa Quesada MD 3/25/2019 0740 Microbiology   2 : left lower lobe bx Tissue Lung, Lower Lobe CULTURE, ANAEROBIC, CULTURE, TISSUE W GRAM STAIN, CULTURE, FUNGUS, CULTURE & SMEAR, AFB Melissa Quesada MD 3/25/2019 1134 Microbiology      Findings: lingular tissue thick, yellow colored.   Lower lobe appeared grossly normal   Complications: none  Implants: * No implants in log *    Condition: stable    Disposition: to pacu

## 2019-03-25 NOTE — ANESTHESIA POSTPROCEDURE EVALUATION
Procedure(s):  LEFT VATS, LT MINI THOROCOTOMY,  LUNG BIOPSY X 2.    general    Anesthesia Post Evaluation        Patient location during evaluation: PACU  Patient participation: complete - patient participated  Level of consciousness: awake  Pain management: adequate  Airway patency: patent  Anesthetic complications: no  Cardiovascular status: hemodynamically stable  Respiratory status: acceptable  Hydration status: acceptable  Comments: I have seen and evaluated the patient. The patient is ready for PACU discharge. [address], DO                         Vitals Value Taken Time   /100 3/25/2019  9:55 AM   Temp 36.9 °C (98.4 °F) 3/25/2019  9:53 AM   Pulse 118 3/25/2019  9:59 AM   Resp 24 3/25/2019  9:59 AM   SpO2 99 % 3/25/2019  9:59 AM   Vitals shown include unvalidated device data.

## 2019-03-25 NOTE — ROUTINE PROCESS
Patient: Osmany Abebe MRN: [id number]  SSN: [id number]   YOB: 1965  Age: 48 y.o. Sex: female     Patient is status post Procedure(s):  LEFT VATS, LT MINI THOROCOTOMY,  LUNG BIOPSY X 2.    Surgeon(s) and Role:     Mihaela Moralez MD - Primary    Local/Dose/Irrigation:  SEE MAR                  Peripheral IV 03/25/19 Right Hand (Active)   Site Assessment Clean, dry, & intact 3/25/2019  6:53 AM   Phlebitis Assessment 0 3/25/2019  6:53 AM   Infiltration Assessment 0 3/25/2019  6:53 AM   Dressing Status Clean, dry, & intact 3/25/2019  6:53 AM   Dressing Type Transparent 3/25/2019  6:53 AM   Hub Color/Line Status Pink; Infusing 3/25/2019  6:53 AM       Peripheral IV 03/25/19 Anterior; Left Forearm (Active)   Site Assessment Clean, dry, & intact 3/25/2019  6:54 AM   Phlebitis Assessment 0 3/25/2019  6:54 AM   Infiltration Assessment 0 3/25/2019  6:54 AM   Dressing Status Clean, dry, & intact 3/25/2019  6:54 AM   Hub Color/Line Status Pink; Infusing 3/25/2019  6:54 AM            Airway - Endotracheal Tube 03/25/19 Oral (Active)                   Dressing/Packing:  Wound Chest Left-Dressing Type :  Other (Comment)(ISLAND DRESSING) (03/25/19 3180)  Splint/Cast:  ]

## 2019-03-26 ENCOUNTER — APPOINTMENT (OUTPATIENT)
Dept: GENERAL RADIOLOGY | Age: 54
DRG: 907 | End: 2019-03-26
Attending: PHYSICIAN ASSISTANT
Payer: COMMERCIAL

## 2019-03-26 LAB
ANION GAP SERPL CALC-SCNC: 10 MMOL/L (ref 5–15)
BUN SERPL-MCNC: 13 MG/DL (ref 6–20)
BUN/CREAT SERPL: 13 (ref 12–20)
CALCIUM SERPL-MCNC: 9 MG/DL (ref 8.5–10.1)
CHLORIDE SERPL-SCNC: 106 MMOL/L (ref 97–108)
CO2 SERPL-SCNC: 22 MMOL/L (ref 21–32)
CREAT SERPL-MCNC: 1.01 MG/DL (ref 0.55–1.02)
ERYTHROCYTE [DISTWIDTH] IN BLOOD BY AUTOMATED COUNT: 13.2 % (ref 11.5–14.5)
GLUCOSE SERPL-MCNC: 122 MG/DL (ref 65–100)
HCT VFR BLD AUTO: 35.7 % (ref 35–47)
HGB BLD-MCNC: 10.8 G/DL (ref 11.5–16)
MCH RBC QN AUTO: 29.4 PG (ref 26–34)
MCHC RBC AUTO-ENTMCNC: 30.3 G/DL (ref 30–36.5)
MCV RBC AUTO: 97.3 FL (ref 80–99)
NRBC # BLD: 0.03 K/UL (ref 0–0.01)
NRBC BLD-RTO: 0.2 PER 100 WBC
PLATELET # BLD AUTO: 319 K/UL (ref 150–400)
PMV BLD AUTO: 9.8 FL (ref 8.9–12.9)
POTASSIUM SERPL-SCNC: 4.4 MMOL/L (ref 3.5–5.1)
RBC # BLD AUTO: 3.67 M/UL (ref 3.8–5.2)
SODIUM SERPL-SCNC: 138 MMOL/L (ref 136–145)
WBC # BLD AUTO: 16.9 K/UL (ref 3.6–11)

## 2019-03-26 PROCEDURE — 74011636637 HC RX REV CODE- 636/637: Performed by: THORACIC SURGERY (CARDIOTHORACIC VASCULAR SURGERY)

## 2019-03-26 PROCEDURE — 94640 AIRWAY INHALATION TREATMENT: CPT

## 2019-03-26 PROCEDURE — 94760 N-INVAS EAR/PLS OXIMETRY 1: CPT

## 2019-03-26 PROCEDURE — 71045 X-RAY EXAM CHEST 1 VIEW: CPT

## 2019-03-26 PROCEDURE — 97161 PT EVAL LOW COMPLEX 20 MIN: CPT

## 2019-03-26 PROCEDURE — 74011250637 HC RX REV CODE- 250/637: Performed by: NURSE PRACTITIONER

## 2019-03-26 PROCEDURE — 36415 COLL VENOUS BLD VENIPUNCTURE: CPT

## 2019-03-26 PROCEDURE — 77030029684 HC NEB SM VOL KT MONA -A

## 2019-03-26 PROCEDURE — 97530 THERAPEUTIC ACTIVITIES: CPT

## 2019-03-26 PROCEDURE — 74011000250 HC RX REV CODE- 250: Performed by: THORACIC SURGERY (CARDIOTHORACIC VASCULAR SURGERY)

## 2019-03-26 PROCEDURE — 85027 COMPLETE CBC AUTOMATED: CPT

## 2019-03-26 PROCEDURE — 74011250636 HC RX REV CODE- 250/636: Performed by: THORACIC SURGERY (CARDIOTHORACIC VASCULAR SURGERY)

## 2019-03-26 PROCEDURE — 65660000000 HC RM CCU STEPDOWN

## 2019-03-26 PROCEDURE — 77010033678 HC OXYGEN DAILY

## 2019-03-26 PROCEDURE — 80048 BASIC METABOLIC PNL TOTAL CA: CPT

## 2019-03-26 PROCEDURE — 74011250637 HC RX REV CODE- 250/637: Performed by: PHYSICIAN ASSISTANT

## 2019-03-26 PROCEDURE — 74011250637 HC RX REV CODE- 250/637: Performed by: THORACIC SURGERY (CARDIOTHORACIC VASCULAR SURGERY)

## 2019-03-26 RX ORDER — PROCHLORPERAZINE MALEATE 5 MG
5 TABLET ORAL
Status: DISCONTINUED | OUTPATIENT
Start: 2019-03-26 | End: 2019-03-28 | Stop reason: HOSPADM

## 2019-03-26 RX ORDER — DILTIAZEM HYDROCHLORIDE 240 MG/1
240 CAPSULE, COATED, EXTENDED RELEASE ORAL DAILY
Status: DISCONTINUED | OUTPATIENT
Start: 2019-03-26 | End: 2019-03-28 | Stop reason: HOSPADM

## 2019-03-26 RX ADMIN — KETOROLAC TROMETHAMINE 15 MG: 30 INJECTION, SOLUTION INTRAMUSCULAR; INTRAVENOUS at 13:27

## 2019-03-26 RX ADMIN — REPAGLINIDE 1 MG: 2 TABLET ORAL at 06:40

## 2019-03-26 RX ADMIN — IPRATROPIUM BROMIDE AND ALBUTEROL SULFATE 3 ML: .5; 3 SOLUTION RESPIRATORY (INHALATION) at 13:27

## 2019-03-26 RX ADMIN — PROCHLORPERAZINE MALEATE 5 MG: 5 TABLET, FILM COATED ORAL at 10:20

## 2019-03-26 RX ADMIN — DILTIAZEM HYDROCHLORIDE 240 MG: 240 CAPSULE, COATED, EXTENDED RELEASE ORAL at 10:20

## 2019-03-26 RX ADMIN — KETOTIFEN FUMARATE 1 DROP: 0.35 SOLUTION/ DROPS OPHTHALMIC at 08:31

## 2019-03-26 RX ADMIN — ASPIRIN 81 MG CHEWABLE TABLET 81 MG: 81 TABLET CHEWABLE at 08:30

## 2019-03-26 RX ADMIN — METFORMIN HYDROCHLORIDE 1000 MG: 500 TABLET, EXTENDED RELEASE ORAL at 08:30

## 2019-03-26 RX ADMIN — ALBUTEROL SULFATE 2.5 MG: 2.5 SOLUTION RESPIRATORY (INHALATION) at 00:16

## 2019-03-26 RX ADMIN — METFORMIN HYDROCHLORIDE 1000 MG: 500 TABLET, EXTENDED RELEASE ORAL at 18:18

## 2019-03-26 RX ADMIN — IPRATROPIUM BROMIDE AND ALBUTEROL SULFATE 3 ML: .5; 3 SOLUTION RESPIRATORY (INHALATION) at 19:29

## 2019-03-26 RX ADMIN — PROCHLORPERAZINE MALEATE 5 MG: 5 TABLET, FILM COATED ORAL at 21:25

## 2019-03-26 RX ADMIN — Medication 10 ML: at 06:40

## 2019-03-26 RX ADMIN — CLONIDINE HYDROCHLORIDE 0.1 MG: 0.1 TABLET ORAL at 08:30

## 2019-03-26 RX ADMIN — REPAGLINIDE 1 MG: 2 TABLET ORAL at 18:20

## 2019-03-26 RX ADMIN — KETOROLAC TROMETHAMINE 15 MG: 30 INJECTION, SOLUTION INTRAMUSCULAR; INTRAVENOUS at 23:18

## 2019-03-26 RX ADMIN — PREDNISONE 30 MG: 10 TABLET ORAL at 08:30

## 2019-03-26 RX ADMIN — KETOROLAC TROMETHAMINE 15 MG: 30 INJECTION, SOLUTION INTRAMUSCULAR; INTRAVENOUS at 18:18

## 2019-03-26 RX ADMIN — KETOROLAC TROMETHAMINE 15 MG: 30 INJECTION, SOLUTION INTRAMUSCULAR; INTRAVENOUS at 06:40

## 2019-03-26 RX ADMIN — LISINOPRIL 20 MG: 20 TABLET ORAL at 08:30

## 2019-03-26 RX ADMIN — CLONIDINE HYDROCHLORIDE 0.1 MG: 0.1 TABLET ORAL at 18:18

## 2019-03-26 RX ADMIN — OXYCODONE AND ACETAMINOPHEN 2 TABLET: 5; 325 TABLET ORAL at 21:25

## 2019-03-26 RX ADMIN — SENNOSIDES AND DOCUSATE SODIUM 1 TABLET: 8.6; 5 TABLET ORAL at 08:30

## 2019-03-26 RX ADMIN — Medication 10 ML: at 21:26

## 2019-03-26 RX ADMIN — ONDANSETRON 4 MG: 2 INJECTION INTRAMUSCULAR; INTRAVENOUS at 08:30

## 2019-03-26 RX ADMIN — HYDROMORPHONE HYDROCHLORIDE 2 MG: 1 INJECTION, SOLUTION INTRAMUSCULAR; INTRAVENOUS; SUBCUTANEOUS at 10:20

## 2019-03-26 RX ADMIN — HYDROMORPHONE HYDROCHLORIDE 2 MG: 1 INJECTION, SOLUTION INTRAMUSCULAR; INTRAVENOUS; SUBCUTANEOUS at 03:54

## 2019-03-26 RX ADMIN — KETOTIFEN FUMARATE 1 DROP: 0.35 SOLUTION/ DROPS OPHTHALMIC at 18:20

## 2019-03-26 RX ADMIN — ENOXAPARIN SODIUM 40 MG: 100 INJECTION SUBCUTANEOUS at 10:20

## 2019-03-26 RX ADMIN — AMLODIPINE BESYLATE 10 MG: 5 TABLET ORAL at 08:30

## 2019-03-26 RX ADMIN — Medication 10 ML: at 13:35

## 2019-03-26 RX ADMIN — IPRATROPIUM BROMIDE AND ALBUTEROL SULFATE 3 ML: .5; 3 SOLUTION RESPIRATORY (INHALATION) at 07:21

## 2019-03-26 NOTE — PROGRESS NOTES
[employer] Bariatric Surgery Consultation for: bariatric surgery    Requesting Physician: Dr Nina Dunne    History of Present Illness:      Eliseo Dougherty is a 48 y.o. female who is POD 1 from a L thoracotomy lung biopsy. She has morbid obesity with a BMI of 61. She has bariatric comorbidities including, arthritis, DM, JENNIFER, GERD. She has tried to lose weight a few times but has not lost more than about 10-20 lbs. THis is the heaviest she has been. She has considered baratric surgery. Her brother and sister have had sleeve and gastric bypass. Her brother has lost 250lbs. I was consulted to discuss bariatric surgery with the patient and give her some education about the process. Past Medical History:   Diagnosis Date    Arrhythmia     tachycardia  (Zara Dougherty)    Asthma 2013    inhaler used    Diabetes ([facility] [address]     pre diabetic per patient     Dyslipidemia     GERD (gastroesophageal reflux disease)     Hypertension     Obesity 2014    JENNIFER on CPAP     Pneumonia 10/2018    RA (rheumatoid arthritis) ([facility] [address]     KNEES    Sepsis ([facility] [address] 10/2018       Past Surgical History:   Procedure Laterality Date    ENDOSCOPY, COLON, DIAGNOSTIC      HX  SECTION      x2    HX CHOLECYSTECTOMY  2006    HX HYSTERECTOMY      complete, endometriosis.      IR BRONCHOSCOPY  2018         Current Facility-Administered Medications:     dilTIAZem CD (CARDIZEM CD) capsule 240 mg, 240 mg, Oral, DAILY, Delon Carrera NP    albuterol-ipratropium (DUO-NEB) 2.5 MG-0.5 MG/3 ML, 3 mL, Nebulization, Q6H RT, Jos Sanchez MD, 3 mL at 19 0721    amLODIPine (NORVASC) tablet 10 mg, 10 mg, Oral, DAILY, Jos Sanchez MD, 10 mg at 19 0830    aspirin chewable tablet 81 mg, 81 mg, Oral, DAILY, Jos Sanchez MD, 81 mg at 19 0830    cloNIDine HCl (CATAPRES) tablet 0.1 mg, 0.1 mg, Oral, BID, Jos Sanchez MD, 0.1 mg at 19 0830    lisinopril (PRINIVIL, ZESTRIL) tablet 20 mg, 20 mg, Oral, DAILY, Eusebio Patel MD, 20 mg at 03/26/19 0830    ergocalciferol capsule 50,000 Units, 50,000 Units, Oral, Q7D, Eusebio Patel MD, 50,000 Units at 03/25/19 1618    metFORMIN ER (GLUCOPHAGE XR) tablet 1,000 mg, 1,000 mg, Oral, BID WITH MEALS, Eusebio Patel MD, 1,000 mg at 03/26/19 0830    predniSONE (DELTASONE) tablet 30 mg, 30 mg, Oral, DAILY WITH BREAKFAST, Eusebio Patel MD, 30 mg at 03/26/19 0830    repaglinide (PRANDIN) tablet 1 mg, 1 mg, Oral, ACB&D, Eusebio Patel MD, 1 mg at 03/26/19 0640    trimethoprim-sulfamethoxazole (BACTRIM DS, SEPTRA DS) 160-800 mg per tablet 1 Tab, 1 Tab, Oral, Q MON, WED & Ok Khan MD, 1 Tab at 03/25/19 2122    sodium chloride (NS) flush 5-40 mL, 5-40 mL, IntraVENous, Q8H, Eusebio Patel MD, 10 mL at 03/26/19 0640    sodium chloride (NS) flush 5-40 mL, 5-40 mL, IntraVENous, PRN, Eusebio Patel MD    ketorolac (TORADOL) injection 15 mg, 15 mg, IntraVENous, Q6H, Eusebio Patel MD, 15 mg at 03/26/19 0640    oxyCODONE-acetaminophen (PERCOCET) 5-325 mg per tablet 2 Tab, 2 Tab, Oral, Q4H PRN, Eusebio Patel MD, 2 Tab at 03/25/19 2209    HYDROmorphone (PF) (DILAUDID) injection 2 mg, 2 mg, IntraVENous, Q3H PRN, Eusebio Patel MD, 2 mg at 03/26/19 0354    naloxone [facility]) injection 0.4 mg, 0.4 mg, IntraVENous, PRN, Eusebio Patel MD    ondansetron [facility]) injection 4 mg, 4 mg, IntraVENous, Q4H PRN, Eusebio Patel MD, 4 mg at 03/26/19 0830    senna-docusate (PERICOLACE) 8.6-50 mg per tablet 1 Tab, 1 Tab, Oral, DAILY, Eusebio Patel MD, 1 Tab at 03/26/19 0830    enoxaparin (LOVENOX) injection 40 mg, 40 mg, SubCUTAneous, Q24H, Eusebio Patel MD    albuterol (PROVENTIL VENTOLIN) nebulizer solution 2.5 mg, 2.5 mg, Nebulization, Q6H PRN, Eusebio Patel MD, 2.5 mg at 03/26/19 0016    ketotifen (ZADITOR) 0.025 % (0.035 %) ophthalmic solution 1 Drop, 1 Drop, Both Eyes, BID, Sherryle Scarce, MD, 1 Drop at 03/26/19 0831    No Known Allergies    Social History     Socioeconomic History    Marital status:      Spouse name: Not on file    Number of children: Not on file    Years of education: Not on file    Highest education level: Not on file   Occupational History    Not on file   Social Needs    Financial resource strain: Not on file    Food insecurity:     Worry: Not on file     Inability: Not on file    Transportation needs:     Medical: Not on file     Non-medical: Not on file   Tobacco Use    Smoking status: Never Smoker    Smokeless tobacco: Never Used   Substance and Sexual Activity    Alcohol use: No    Drug use: No    Sexual activity: Yes     Partners: Male     Birth control/protection: Surgical   Lifestyle    Physical activity:     Days per week: Not on file     Minutes per session: Not on file    Stress: Not on file   Relationships    Social connections:     Talks on phone: Not on file     Gets together: Not on file     Attends Judaism service: Not on file     Active member of club or organization: Not on file     Attends meetings of clubs or organizations: Not on file     Relationship status: Not on file    Intimate partner violence:     Fear of current or ex partner: Not on file     Emotionally abused: Not on file     Physically abused: Not on file     Forced sexual activity: Not on file   Other Topics Concern    Not on file   Social History Narrative    Not on file       Family History   Problem Relation Age of Onset    Other Mother         CAD    Hypertension Mother     Hypertension Father     Kidney Disease Father         End Stage Renal, Dialysis    Hypertension Brother     Diabetes Paternal Uncle     Other Maternal Grandmother         CAD    Heart Attack Maternal Grandmother     Cancer Maternal Grandfather         Throat Cancer    Dementia Paternal Grandmother         Alzheimer's Dementia    Cancer Paternal Grandfather         Colon Cancer    Breast Cancer Maternal Aunt 62        mothers 1/2 sister    Diabetes Paternal Uncle     Anesth Problems Neg Hx        ROS   Constitutional: negative  Ears, Nose, Mouth, Throat, and Face: negative  Respiratory: positive for dyspnea on exertion  Cardiovascular: negative  Gastrointestinal: negative  Genitourinary:negative  Integument/Breast: negative  Hematologic/Lymphatic: negative  Behavioral/Psychiatric: negative  Allergic/Immunologic: negative      Physical Exam:     Visit Vitals  /66 (BP 1 Location: Right arm, BP Patient Position: At rest;Sitting)   Pulse (!) 115   Temp 98 °F (36.7 °C)   Resp 18   Ht 5' (1.524 m)   Wt 313 lb 15 oz (142.4 kg)   SpO2 100%   Breastfeeding?  No   BMI 61.31 kg/m²       General - alert and oriented, no apparent distress, well developed  HEENT - no jaundice, no hearing imparement  Pulm - CTAB, no C/W/R  CV - RRR, no M/R/G  Abd - obese, BS present, NTTP, soft  Ext - pulses intact in UE and LE bilaterally, no edema  Skin - supple and no rashes  Psychiatric - normal affect, good mood    Labs  Recent Results (from the past 12 hour(s))   GLUCOSE, POC    Collection Time: 03/25/19 10:32 PM   Result Value Ref Range    Glucose (POC) 94 65 - 100 mg/dL    Performed by Tiffanie Fishererson    METABOLIC PANEL, BASIC    Collection Time: 03/26/19  4:07 AM   Result Value Ref Range    Sodium 138 136 - 145 mmol/L    Potassium 4.4 3.5 - 5.1 mmol/L    Chloride 106 97 - 108 mmol/L    CO2 22 21 - 32 mmol/L    Anion gap 10 5 - 15 mmol/L    Glucose 122 (H) 65 - 100 mg/dL    BUN 13 6 - 20 MG/DL    Creatinine 1.01 0.55 - 1.02 MG/DL    BUN/Creatinine ratio 13 12 - 20      GFR est AA >60 >60 ml/min/1.73m2    GFR est non-AA 58 (L) >60 ml/min/1.73m2    Calcium 9.0 8.5 - 10.1 MG/DL   CBC W/O DIFF    Collection Time: 03/26/19  4:07 AM   Result Value Ref Range    WBC 16.9 (H) 3.6 - 11.0 K/uL    RBC 3.67 (L) 3.80 - 5.20 M/uL    HGB 10.8 (L) 11.5 - 16.0 g/dL    HCT 35.7 35.0 - 47.0 %    MCV 97.3 80.0 - 99.0 FL    MCH 29.4 26.0 - 34.0 PG    MCHC 30.3 30.0 - 36.5 g/dL    RDW 13.2 11.5 - 14.5 %    PLATELET 108 312 - 689 K/uL    MPV 9.8 8.9 - 12.9 FL    NRBC 0.2 (H) 0  WBC    ABSOLUTE NRBC 0.03 (H) 0.00 - 0.01 K/uL         Imaging  CXR - Single AP portable view of the chest obtained at 4:37 AM demonstrates  no change in position of the left chest tube. The cardiomediastinal silhouette  is stable. Lung volumes remain low. There is unchanged left basilar airspace  disease. No pneumothorax is seen. There is no evidence of pleural effusion. Simultaneous emphysema is again seen in the left lateral chest wall.     IMPRESSION  IMPRESSION: Stable appearance of the chest.  I have personally reviewed all of the pertinent images     Assessment:     Mendez Duran is a 48 y.o. female with morbid obesity BMI 61, GERD, DM, arthritis, JENNIFER    Recommendations:     1. She seems to be a good candidate for bariatric surgery. She does not appear interested in surgery right now though. She would like to try and lose weight on her own if possible. She has a lot of weight lose on her own, about 150+lbs. She will think about surgery and follow up with us when she is ready. Thank you for the consultation.     Lona Sena MD

## 2019-03-26 NOTE — PROGRESS NOTES
Bedside shift change report given to Laney Chandler RN (oncoming nurse) by Buddy Coronel RN (offgoing nurse). Report included the following information SBAR, Procedure Summary, Intake/Output, MAR, Recent Results and Cardiac Rhythm sinus tachy.

## 2019-03-26 NOTE — PROGRESS NOTES
Thoracic Surgery Simple Progress Note    Admit Date: 3/25/2019  POD: 1 Day Post-Op      Procedure:  Procedure(s):  LEFT VATS, LT MINI THOROCOTOMY,  LUNG BIOPSY X 2      Subjective:     Patient has complaints: No significant medical complaints  Up in chair at bedside. Using IS. Appreciate Bariatric surgery consult by Dr. Eulogio Laws of Systems:    CARDIAC: positive for HTN, tachycardia  RESP: positive for asthma or ILD  NEURO:  negative  INCISION: Clean, dry, and intact  EXT: Denies new swelling or pain in the legs or calves. Objective:     Blood pressure 138/66, pulse (!) 115, temperature 98 °F (36.7 °C), resp. rate 18, height 5' (1.524 m), weight 313 lb 15 oz (142.4 kg), SpO2 100 %, not currently breastfeeding. Temp (24hrs), Av.6 °F (37 °C), Min:98 °F (36.7 °C), Max:99.3 °F (37.4 °C)        Hemodynamics    PAP    CO    CI    No intake/output data recorded.  1901 -  0700  In: 700 [I.V.:700]  Out: 184     EXAM:  GENERAL: VSS, afrible, alert and cooperative, morbid obesity  HEART:  Regular rhythm, tachy, S1, S2 normal, no murmur  LUNG: clear to auscultation bilaterally, Left CT in place without airleak noted. 170ml total output  NEURO:  normal without focal findings  mental status, speech normal, A&O x3  INCISION: Clean, dry, and intact  EXTREMITIES:No evidence of DVT seen on physical exam.  GI/: Abd soft, nontender.  +BM Voiding    Labs:  Recent Results (from the past 24 hour(s))   GLUCOSE, POC    Collection Time: 19  9:58 AM   Result Value Ref Range    Glucose (POC) 122 (H) 65 - 100 mg/dL    Performed by Jf Rico, POC    Collection Time: 19  4:15 PM   Result Value Ref Range    Glucose (POC) 146 (H) 65 - 100 mg/dL    Performed by Iesha Mac    GLUCOSE, POC    Collection Time: 19 10:32 PM   Result Value Ref Range    Glucose (POC) 94 65 - 100 mg/dL    Performed by Luis Prescott 2., BASIC    Collection Time: 19  4:07 AM   Result Value Ref Range    Sodium 138 136 - 145 mmol/L    Potassium 4.4 3.5 - 5.1 mmol/L    Chloride 106 97 - 108 mmol/L    CO2 22 21 - 32 mmol/L    Anion gap 10 5 - 15 mmol/L    Glucose 122 (H) 65 - 100 mg/dL    BUN 13 6 - 20 MG/DL    Creatinine 1.01 0.55 - 1.02 MG/DL    BUN/Creatinine ratio 13 12 - 20      GFR est AA >60 >60 ml/min/1.73m2    GFR est non-AA 58 (L) >60 ml/min/1.73m2    Calcium 9.0 8.5 - 10.1 MG/DL   CBC W/O DIFF    Collection Time: 03/26/19  4:07 AM   Result Value Ref Range    WBC 16.9 (H) 3.6 - 11.0 K/uL    RBC 3.67 (L) 3.80 - 5.20 M/uL    HGB 10.8 (L) 11.5 - 16.0 g/dL    HCT 35.7 35.0 - 47.0 %    MCV 97.3 80.0 - 99.0 FL    MCH 29.4 26.0 - 34.0 PG    MCHC 30.3 30.0 - 36.5 g/dL    RDW 13.2 11.5 - 14.5 %    PLATELET 147 155 - 842 K/uL    MPV 9.8 8.9 - 12.9 FL    NRBC 0.2 (H) 0  WBC    ABSOLUTE NRBC 0.03 (H) 0.00 - 0.01 K/uL       Assessment:   No evidence of DVT.   Active Problems:    ILD (interstitial lung disease) ([facility] [address] (3/25/2019)      PATH: pending  Plan/Recommendations:     Keep CT to waterseal  OOB & ambulation w assistance  PT ordered  Regular diet  IS  DVT proph  CXR in am  See orders    Signed By: DAYANNA Ortiz     March 26, 2019

## 2019-03-26 NOTE — PROGRESS NOTES
Problem: Mobility Impaired (Adult and Pediatric)  Goal: *Acute Goals and Plan of Care (Insert Text)  Description  Physical Therapy Goals  Initiated 3/26/2019  1. Patient will move from supine to sit and sit to supine  in bed with independence within 7 day(s). 2.  Patient will transfer from bed to chair and chair to bed with independence using the least restrictive device within 7 day(s). 3.  Patient will perform sit to stand with independence within 7 day(s). 4.  Patient will ambulate with independence for 150 feet with the least restrictive device within 7 day(s). 5.  Patient will ascend/descend 5 stairs with 1 handrail(s) with modified independence within 7 day(s). Outcome: Progressing Towards Goal   PHYSICAL THERAPY EVALUATION  Patient: Evelyn Torres (50 y.o. female)  Date: 3/26/2019  Primary Diagnosis: ILD (interstitial lung disease) ([facility] [J84.9]  Procedure(s) (LRB):  LEFT VATS, LT MINI THOROCOTOMY,  LUNG BIOPSY X 2 (Left) 1 Day Post-Op   Precautions:   Fall    ASSESSMENT :   Based on the objective data described below, the patient presents with Independent PLOF. Today presents with Minimum assistance level overall for transfers. Gait training completed 3 feet from chair to bed with hand hold assist of 2 at the Contact guard assistance level of assistance. Deferred further ambulation and assisted pt back to bed due to cardiopulmonary tolerance. Sitting /112, HR 130s, and SPO2 71 - 87%. Pt without complaint and reports no pain at this time. The following are barriers to independence while in acute care:   -Cognitive and/or behavioral: none   -Medical condition: strength, functional endurance, cardiopulmonary tolerance, hypertension and girth    -Other:       The patient will benefit from skilled acute intervention to address the above impairments and their rehabilitation potential is considered to be Good    Discharge recommendations:  To be determined between the prior selections, based on patient progress during hospital stay    Patient's barriers to discharging home, in addition to above impairments: none. Equipment recommendations for successful discharge (if) home: to be determined pending progress      PLAN :  Recommendations and Planned Interventions: stairs, bed mobility training, transfer training and gait training      Frequency/Duration: Patient will be followed by physical therapy  5 times a week to address goals. SUBJECTIVE:   Patient stated ? Can I walk a little more??      OBJECTIVE DATA SUMMARY:   HISTORY:    Past Medical History:   Diagnosis Date    Arrhythmia     tachycardia  (Zara Chaudhari)    Asthma 2013    inhaler used    Diabetes ([facility]     pre diabetic per patient     Dyslipidemia     GERD (gastroesophageal reflux disease)     Hypertension     Obesity 2014    JENNIFER on CPAP     Pneumonia 10/2018    RA (rheumatoid arthritis) ([facility]     KNEES    Sepsis ([facility] 10/2018     Past Surgical History:   Procedure Laterality Date    ENDOSCOPY, COLON, DIAGNOSTIC      HX  SECTION      x2    HX CHOLECYSTECTOMY  2006    HX HYSTERECTOMY      complete, endometriosis.      IR BRONCHOSCOPY  2018     Prior Level of Function/Home Situation: PTA pt  was Independent, employed,  at [address] [place] [address], denies falls in the last year, pt lives with her   Personal factors and/or comorbidities impacting plan of care: morbid obesity, medical history     Home Situation  Home Environment: Private residence  # Steps to Enter: 5  Rails to Enter: Yes  Hand Rails : Bilateral  One/Two Story Residence: One story  Living Alone: No  Support Systems: Spouse/Significant Other/Partner  Patient Expects to be Discharged to[de-identified] Private residence  Current DME Used/Available at Home: CPAP, Glucometer  Tub or Shower Type: Shower    EXAMINATION/PRESENTATION/DECISION MAKING:   Critical Behavior:  Neurologic State: Alert, Appropriate for age  Orientation Level: Oriented X4  Cognition: Appropriate decision making, Appropriate for age attention/concentration, Follows commands  Safety/Judgement: Lack of insight into deficits  Hearing: Auditory  Auditory Impairment: None  Hearing Aids/Status: Does not own  Skin:  intact, has chest tube    Range Of Motion:  AROM: Generally decreased, functional                       Strength:    Strength: Generally decreased, functional                    Tone & Sensation:   Tone: Normal              Sensation: Intact               Coordination:  Coordination: Within functional limits       Functional Mobility:  Bed Mobility:        Sit to Supine: Minimum assistance; Additional time;Assist x2  Scooting: Contact guard assistance  Transfers:  Sit to Stand: Contact guard assistance; Additional time;Assist x2  Stand to Sit: Contact guard assistance; Additional time;Assist x2                       Balance:   Sitting: Intact  Standing: Intact  Ambulation/Gait Training:  Distance (ft): 3 Feet (ft), chair to bed     Ambulation - Level of Assistance: Contact guard assistance;Assist x2        Gait Abnormalities: Decreased step clearance;Trunk sway increased, generally steady        Base of Support: Widened     Speed/Chelsy: Pace decreased (<100 feet/min)  Step Length: Left shortened;Right shortened                   Functional Measure:  Tinetti test:    Sitting Balance: 1  Arises: 2  Attempts to Rise: 2  Immediate Standing Balance: 2  Standing Balance: 2  Nudged: 2  Eyes Closed: 1  Turn 360 Degrees - Continuous/Discontinuous: 1  Turn 360 Degrees - Steady/Unsteady: 1  Sitting Down: 2  Balance Score: 16  Indication of Gait: 1  R Step Length/Height: 1  L Step Length/Height: 1  R Foot Clearance: 1  L Foot Clearance: 1  Step Symmetry: 1  Step Continuity: 1  Path: 2  Trunk: 1  Walking Time: 0  Gait Score: 10  Total Score: 26         Tinetti Tool Score Risk of Falls  <19 = High Fall Risk  19-24 = Moderate Fall Risk  25-28 = Low Fall Risk  Tinetti ME.  Performance-Oriented Assessment of Mobility Problems in Elderly Patients. [facility]; [id number]. (Scoring Description: PT Bulletin Feb. 10, 1993)    Older adults: Nichelle Ji et al, 2009; n = 1000 [facility] elderly evaluated with ABC, REAGAN, ADL, and IADL)  · Mean REAGAN score for males aged 69-68 years = 26.21(3.40)  · Mean REAGAN score for females age 69-68 years = 25.16(4.30)  · Mean REAGAN score for males over 80 years = 23.29(6.02)  · Mean REAGAN score for females over 80 years = 17.20(8.32)            Physical Therapy Evaluation Charge Determination   History Examination Presentation Decision-Making   MEDIUM  Complexity : 1-2 comorbidities / personal factors will impact the outcome/ POC  MEDIUM Complexity : 3 Standardized tests and measures addressing body structure, function, activity limitation and / or participation in recreation  MEDIUM Complexity : Evolving with changing characteristics  MEDIUM Complexity : FOTO score of 26-74      Based on the above components, the patient evaluation is determined to be of the following complexity level: MEDIUM    Pain:  Denies pain    Activity Tolerance:   Poor due to HTN, desaturation to 71%, found on RA initially, increased to 87% with 3 LPM O2, and tachycardic   Please refer to the flowsheet for vital signs taken during this treatment. Vitals:    03/26/19 0900 03/26/19 1058 03/26/19 1102 03/26/19 1109   BP: (!) 177/112 (!) 193/112 (!) 167/99    BP 1 Location: Right arm Right arm Right arm    BP Patient Position: Sitting, noted O2 was not connected to wall oxygen Supine Supine    Pulse: (!) 131 (!) 143 (!) 112    Resp:       Temp:       SpO2: (!) 71% (!) 77% (!) 84% 95%   Weight:       Height:            After treatment patient left:   Supine in bed  Call light within reach     COMMUNICATION/EDUCATION:   The patient?s plan of care was discussed with: Registered Nurse. Fall prevention education was provided and the patient/caregiver indicated understanding.  and Patient/family agree to work toward stated goals and plan of care.     Thank you for this referral.  Lakeshia Doyle   Time Calculation: 23 mins

## 2019-03-26 NOTE — PROGRESS NOTES
.Reason for Admission: Interstitial  Lung disease                    RRAT Score:  9                  Plan for utilizing home health:   Not indicated                       Likelihood of Readmission:  Low                         Transition of Care Plan: CM met with patient, , mother and son to discuss discharge planning. Patient lives with her  in their private residence. She is independent without any assistive devices. Patient's  will provide transportation home and she did not voice any discharge barriers. Patient will be discharged home in care of her . Ingrid Johns MSA, RN, CRM. Care Management Interventions  PCP Verified by CM: Yes  Palliative Care Criteria Met (RRAT>21 & CHF Dx)?: No  Mode of Transport at Discharge:  Other (see comment)(Private car)  Transition of Care Consult (CM Consult): Discharge Planning  MyChart Signup: No  Discharge Durable Medical Equipment: No  Health Maintenance Reviewed: Yes  Physical Therapy Consult: No  Occupational Therapy Consult: No  Speech Therapy Consult: No  Current Support Network: Lives with Spouse  Confirm Follow Up Transport: Family  Plan discussed with Pt/Family/Caregiver: Yes  Discharge Location  Discharge Placement: Home with family assistance

## 2019-03-27 ENCOUNTER — APPOINTMENT (OUTPATIENT)
Dept: GENERAL RADIOLOGY | Age: 54
DRG: 907 | End: 2019-03-27
Attending: PHYSICIAN ASSISTANT
Payer: COMMERCIAL

## 2019-03-27 ENCOUNTER — APPOINTMENT (OUTPATIENT)
Dept: GENERAL RADIOLOGY | Age: 54
DRG: 907 | End: 2019-03-27
Attending: NURSE PRACTITIONER
Payer: COMMERCIAL

## 2019-03-27 PROCEDURE — 94640 AIRWAY INHALATION TREATMENT: CPT

## 2019-03-27 PROCEDURE — 74011636637 HC RX REV CODE- 636/637: Performed by: THORACIC SURGERY (CARDIOTHORACIC VASCULAR SURGERY)

## 2019-03-27 PROCEDURE — 94760 N-INVAS EAR/PLS OXIMETRY 1: CPT

## 2019-03-27 PROCEDURE — 71046 X-RAY EXAM CHEST 2 VIEWS: CPT

## 2019-03-27 PROCEDURE — 74011250637 HC RX REV CODE- 250/637: Performed by: THORACIC SURGERY (CARDIOTHORACIC VASCULAR SURGERY)

## 2019-03-27 PROCEDURE — 74011250637 HC RX REV CODE- 250/637: Performed by: PHYSICIAN ASSISTANT

## 2019-03-27 PROCEDURE — 74011000250 HC RX REV CODE- 250: Performed by: THORACIC SURGERY (CARDIOTHORACIC VASCULAR SURGERY)

## 2019-03-27 PROCEDURE — 97116 GAIT TRAINING THERAPY: CPT

## 2019-03-27 PROCEDURE — 71045 X-RAY EXAM CHEST 1 VIEW: CPT

## 2019-03-27 PROCEDURE — 74011250636 HC RX REV CODE- 250/636: Performed by: THORACIC SURGERY (CARDIOTHORACIC VASCULAR SURGERY)

## 2019-03-27 PROCEDURE — 65660000000 HC RM CCU STEPDOWN

## 2019-03-27 PROCEDURE — 77010033678 HC OXYGEN DAILY

## 2019-03-27 PROCEDURE — 74011250637 HC RX REV CODE- 250/637: Performed by: NURSE PRACTITIONER

## 2019-03-27 RX ORDER — BUMETANIDE 1 MG/1
2 TABLET ORAL DAILY
Status: DISCONTINUED | OUTPATIENT
Start: 2019-03-27 | End: 2019-03-28 | Stop reason: HOSPADM

## 2019-03-27 RX ADMIN — BUMETANIDE 2 MG: 1 TABLET ORAL at 11:59

## 2019-03-27 RX ADMIN — CLONIDINE HYDROCHLORIDE 0.1 MG: 0.1 TABLET ORAL at 08:50

## 2019-03-27 RX ADMIN — KETOTIFEN FUMARATE 1 DROP: 0.35 SOLUTION/ DROPS OPHTHALMIC at 09:00

## 2019-03-27 RX ADMIN — SULFAMETHOXAZOLE AND TRIMETHOPRIM 1 TABLET: 800; 160 TABLET ORAL at 20:58

## 2019-03-27 RX ADMIN — LISINOPRIL 20 MG: 20 TABLET ORAL at 08:50

## 2019-03-27 RX ADMIN — KETOTIFEN FUMARATE 1 DROP: 0.35 SOLUTION/ DROPS OPHTHALMIC at 17:23

## 2019-03-27 RX ADMIN — METFORMIN HYDROCHLORIDE 1000 MG: 500 TABLET, EXTENDED RELEASE ORAL at 08:51

## 2019-03-27 RX ADMIN — IPRATROPIUM BROMIDE AND ALBUTEROL SULFATE 3 ML: .5; 3 SOLUTION RESPIRATORY (INHALATION) at 07:36

## 2019-03-27 RX ADMIN — PROCHLORPERAZINE MALEATE 5 MG: 5 TABLET, FILM COATED ORAL at 04:20

## 2019-03-27 RX ADMIN — KETOROLAC TROMETHAMINE 15 MG: 30 INJECTION, SOLUTION INTRAMUSCULAR; INTRAVENOUS at 07:09

## 2019-03-27 RX ADMIN — REPAGLINIDE 1 MG: 2 TABLET ORAL at 17:22

## 2019-03-27 RX ADMIN — IPRATROPIUM BROMIDE AND ALBUTEROL SULFATE 3 ML: .5; 3 SOLUTION RESPIRATORY (INHALATION) at 01:20

## 2019-03-27 RX ADMIN — CLONIDINE HYDROCHLORIDE 0.1 MG: 0.1 TABLET ORAL at 17:22

## 2019-03-27 RX ADMIN — KETOROLAC TROMETHAMINE 15 MG: 30 INJECTION, SOLUTION INTRAMUSCULAR; INTRAVENOUS at 11:59

## 2019-03-27 RX ADMIN — IPRATROPIUM BROMIDE AND ALBUTEROL SULFATE 3 ML: .5; 3 SOLUTION RESPIRATORY (INHALATION) at 15:04

## 2019-03-27 RX ADMIN — ASPIRIN 81 MG CHEWABLE TABLET 81 MG: 81 TABLET CHEWABLE at 08:50

## 2019-03-27 RX ADMIN — Medication 10 ML: at 20:58

## 2019-03-27 RX ADMIN — KETOROLAC TROMETHAMINE 15 MG: 30 INJECTION, SOLUTION INTRAMUSCULAR; INTRAVENOUS at 23:39

## 2019-03-27 RX ADMIN — OXYCODONE AND ACETAMINOPHEN 2 TABLET: 5; 325 TABLET ORAL at 04:20

## 2019-03-27 RX ADMIN — PREDNISONE 30 MG: 10 TABLET ORAL at 08:51

## 2019-03-27 RX ADMIN — DILTIAZEM HYDROCHLORIDE 240 MG: 240 CAPSULE, COATED, EXTENDED RELEASE ORAL at 08:50

## 2019-03-27 RX ADMIN — KETOROLAC TROMETHAMINE 15 MG: 30 INJECTION, SOLUTION INTRAMUSCULAR; INTRAVENOUS at 17:22

## 2019-03-27 RX ADMIN — REPAGLINIDE 1 MG: 2 TABLET ORAL at 07:09

## 2019-03-27 RX ADMIN — METFORMIN HYDROCHLORIDE 1000 MG: 500 TABLET, EXTENDED RELEASE ORAL at 17:22

## 2019-03-27 RX ADMIN — ENOXAPARIN SODIUM 40 MG: 100 INJECTION SUBCUTANEOUS at 08:52

## 2019-03-27 RX ADMIN — OXYCODONE AND ACETAMINOPHEN 2 TABLET: 5; 325 TABLET ORAL at 20:58

## 2019-03-27 RX ADMIN — Medication 10 ML: at 07:09

## 2019-03-27 RX ADMIN — SENNOSIDES AND DOCUSATE SODIUM 1 TABLET: 8.6; 5 TABLET ORAL at 08:51

## 2019-03-27 RX ADMIN — Medication 10 ML: at 17:23

## 2019-03-27 NOTE — PROGRESS NOTES
Bedside shift change report given to Shannon Doshi RN (oncoming nurse) by Chadwick Devlin RN (offgoing nurse). Report included the following information SBAR.

## 2019-03-27 NOTE — PROGRESS NOTES
Thoracic Surgery Simple Progress Note    Admit Date: 3/25/2019  POD: 2 Days Post-Op      Procedure:  Procedure(s):  LEFT VATS, LT MINI THOROCOTOMY,  LUNG BIOPSY X 2      Subjective:     Patient has complaints: No significant medical complaints    Review of Systems:    CARDIAC: Positive for HTN  RESP: positive for asthma or ILD  NEURO:  negative  INCISION: Clean, dry, and intact  EXT: Denies new swelling or pain in the legs or calves. Objective:     Blood pressure 137/60, pulse (!) 109, temperature 98.5 °F (36.9 °C), resp. rate 20, height 5' (1.524 m), weight 311 lb 8.2 oz (141.3 kg), SpO2 97 %, not currently breastfeeding. Temp (24hrs), Av.9 °F (37.2 °C), Min:98.5 °F (36.9 °C), Max:99.2 °F (37.3 °C)        Hemodynamics    PAP    CO    CI    No intake/output data recorded.  1901 -  0700  In: -   Out: 248     EXAM:  GENERAL: VSS, afrible, alert and cooperative  HEART:  regular rate and rhythm  LUNG: clear to auscultation bilaterally, chest tube in place, no air leak, 124 ml output in 24 hr. CXR reviewed  NEURO:  normal without focal findings  mental status, speech normal, alert and oriented x iii  INCISION: Clean, dry, and intact  EXTREMITIES:No evidence of DVT seen on physical exam.  GI/: Abd soft, nonterder with + bowel sounds. BM yesterday. Voiding    Labs:No results found for this or any previous visit (from the past 24 hour(s)). Assessment:   No evidence of DVT.   Active Problems:    ILD (interstitial lung disease) ([facility] (3/25/2019)      PATH: Pending  Plan/Recommendations:   Wean O2 if possible  Assess oxygen needs while ambulating  Pull chest tube today    See orders    Signed By: Mahi HOGAN

## 2019-03-27 NOTE — INTERDISCIPLINARY ROUNDS
.Bedside and Verbal shift change report given to Shalini (oncoming nurse) by Sri Farias (offgoing nurse). Report included the following information SBAR, Kardex, Intake/Output and MAR.

## 2019-03-27 NOTE — PROGRESS NOTES
Problem: Mobility Impaired (Adult and Pediatric)  Goal: *Acute Goals and Plan of Care (Insert Text)  Description  Physical Therapy Goals  Initiated 3/26/2019  1. Patient will move from supine to sit and sit to supine  in bed with independence within 7 day(s). 2.  Patient will transfer from bed to chair and chair to bed with independence using the least restrictive device within 7 day(s). 3.  Patient will perform sit to stand with independence within 7 day(s). 4.  Patient will ambulate with independence for 150 feet with the least restrictive device within 7 day(s). 5.  Patient will ascend/descend 5 stairs with 1 handrail(s) with modified independence within 7 day(s). Outcome: Progressing Towards Goal    PHYSICAL THERAPY TREATMENT  Patient: Jennifer Schuster (52 y.o. female)  Date: 3/27/2019  Diagnosis: [facility]inter[facility] [J84.9] <principal problem not specified>  Procedure(s) (LRB):  LEFT VATS, LT MINI THOROCOTOMY,  LUNG BIOPSY X 2 (Left) 2 Days Post-Op  Precautions: Fall  Chart, physical therapy assessment, plan of care and goals were reviewed. ASSESSMENT:  Pt is performing sit to stands at a mod I level and ambulating at SBA with no A.D. Pt requesting to ambulate without supplemental O2. At rest SpO2 94% on room air HR 133bpm with activity saturations decreases the lowest SPO2 85% on room air HR 164bpm. Pt was able to recover with standing rest break. Pt is able to ambulate with nursing. Pt does  not want to discharge with supplemental O2 but as of today she will need it. Progression toward goals:  ?    Improving appropriately and progressing toward goals  ? Improving slowly and progressing toward goals  ? Not making progress toward goals and plan of care will be adjusted     PLAN:  Patient continues to benefit from skilled intervention to address the above impairments. Continue treatment per established plan of care.   Discharge Recommendations:  None  Further Equipment Recommendations for Discharge:  supplemental oxygen     SUBJECTIVE:   Patient stated ? Lets see how I do without it. ?    OBJECTIVE DATA SUMMARY:   Critical Behavior:  Neurologic State: Alert  Orientation Level: Oriented X4  Cognition: Appropriate decision making  Safety/Judgement: Lack of insight into deficits  Functional Mobility Training:  Bed Mobility:                    Transfers:  Sit to Stand: Modified independent  Stand to Sit: Modified independent                             Balance:  Sitting: Intact  Standing: Intact  Ambulation/Gait Training:  Distance (ft): 180 Feet (ft)     Ambulation - Level of Assistance: Stand-by assistance        Gait Abnormalities: Decreased step clearance                 Step Length: Left shortened;Right shortened                    Stairs:              Neuro Re-Education:    Therapeutic Exercises:   Pain:  Pain Scale 1: Numeric (0 - 10)  Pain Intensity 1: 0              Activity Tolerance:   Limited   Please refer to the flowsheet for vital signs taken during this treatment. After treatment:   ?    Patient left in no apparent distress sitting up in chair  ? Patient left in no apparent distress in bed  ? Call bell left within reach  ? Nursing notified  ? Caregiver present  ?     Bed alarm activated    COMMUNICATION/COLLABORATION:   The patient?s plan of care was discussed with: Registered Nurse    Radha Fischer PTA   Time Calculation: 12 mins

## 2019-03-28 ENCOUNTER — APPOINTMENT (OUTPATIENT)
Dept: GENERAL RADIOLOGY | Age: 54
DRG: 907 | End: 2019-03-28
Attending: PHYSICIAN ASSISTANT
Payer: COMMERCIAL

## 2019-03-28 VITALS
RESPIRATION RATE: 20 BRPM | DIASTOLIC BLOOD PRESSURE: 69 MMHG | WEIGHT: 293 LBS | TEMPERATURE: 97.8 F | HEART RATE: 127 BPM | OXYGEN SATURATION: 94 % | HEIGHT: 60 IN | SYSTOLIC BLOOD PRESSURE: 150 MMHG | BODY MASS INDEX: 57.52 KG/M2

## 2019-03-28 PROCEDURE — 74011250637 HC RX REV CODE- 250/637: Performed by: NURSE PRACTITIONER

## 2019-03-28 PROCEDURE — 71045 X-RAY EXAM CHEST 1 VIEW: CPT

## 2019-03-28 PROCEDURE — 74011250636 HC RX REV CODE- 250/636: Performed by: THORACIC SURGERY (CARDIOTHORACIC VASCULAR SURGERY)

## 2019-03-28 PROCEDURE — 74011636637 HC RX REV CODE- 636/637: Performed by: THORACIC SURGERY (CARDIOTHORACIC VASCULAR SURGERY)

## 2019-03-28 PROCEDURE — 74011000250 HC RX REV CODE- 250: Performed by: THORACIC SURGERY (CARDIOTHORACIC VASCULAR SURGERY)

## 2019-03-28 PROCEDURE — 74011250637 HC RX REV CODE- 250/637: Performed by: THORACIC SURGERY (CARDIOTHORACIC VASCULAR SURGERY)

## 2019-03-28 PROCEDURE — 94640 AIRWAY INHALATION TREATMENT: CPT

## 2019-03-28 RX ORDER — OXYCODONE AND ACETAMINOPHEN 5; 325 MG/1; MG/1
1-2 TABLET ORAL
Qty: 60 TAB | Refills: 0 | Status: SHIPPED | OUTPATIENT
Start: 2019-03-28 | End: 2019-04-11

## 2019-03-28 RX ORDER — AMOXICILLIN 250 MG
1 CAPSULE ORAL DAILY
Qty: 30 TAB | Refills: 1 | Status: SHIPPED | OUTPATIENT
Start: 2019-03-28 | End: 2020-01-17

## 2019-03-28 RX ORDER — PROCHLORPERAZINE MALEATE 5 MG
5 TABLET ORAL
Qty: 30 TAB | Refills: 0 | Status: SHIPPED | OUTPATIENT
Start: 2019-03-28 | End: 2019-04-04

## 2019-03-28 RX ADMIN — ENOXAPARIN SODIUM 40 MG: 100 INJECTION SUBCUTANEOUS at 08:58

## 2019-03-28 RX ADMIN — CLONIDINE HYDROCHLORIDE 0.1 MG: 0.1 TABLET ORAL at 09:00

## 2019-03-28 RX ADMIN — BUMETANIDE 2 MG: 1 TABLET ORAL at 08:59

## 2019-03-28 RX ADMIN — Medication 10 ML: at 05:51

## 2019-03-28 RX ADMIN — OXYCODONE AND ACETAMINOPHEN 2 TABLET: 5; 325 TABLET ORAL at 03:54

## 2019-03-28 RX ADMIN — KETOROLAC TROMETHAMINE 15 MG: 30 INJECTION, SOLUTION INTRAMUSCULAR; INTRAVENOUS at 05:51

## 2019-03-28 RX ADMIN — METFORMIN HYDROCHLORIDE 1000 MG: 500 TABLET, EXTENDED RELEASE ORAL at 09:00

## 2019-03-28 RX ADMIN — LISINOPRIL 20 MG: 20 TABLET ORAL at 08:59

## 2019-03-28 RX ADMIN — SENNOSIDES AND DOCUSATE SODIUM 1 TABLET: 8.6; 5 TABLET ORAL at 08:59

## 2019-03-28 RX ADMIN — ASPIRIN 81 MG CHEWABLE TABLET 81 MG: 81 TABLET CHEWABLE at 08:59

## 2019-03-28 RX ADMIN — PREDNISONE 30 MG: 10 TABLET ORAL at 08:59

## 2019-03-28 RX ADMIN — IPRATROPIUM BROMIDE AND ALBUTEROL SULFATE 3 ML: .5; 3 SOLUTION RESPIRATORY (INHALATION) at 07:25

## 2019-03-28 RX ADMIN — DILTIAZEM HYDROCHLORIDE 240 MG: 240 CAPSULE, COATED, EXTENDED RELEASE ORAL at 08:59

## 2019-03-28 RX ADMIN — IPRATROPIUM BROMIDE AND ALBUTEROL SULFATE 3 ML: .5; 3 SOLUTION RESPIRATORY (INHALATION) at 00:18

## 2019-03-28 RX ADMIN — REPAGLINIDE 1 MG: 2 TABLET ORAL at 06:57

## 2019-03-28 NOTE — PROGRESS NOTES
Thoracic Surgery Simple Progress Note    Admit Date: 3/25/2019  POD: 3 Days Post-Op      Procedure:  Procedure(s):  LEFT VATS, LT MINI THOROCOTOMY,  LUNG BIOPSY X 2      Subjective:     Patient has complaints: No significant medical complaints    Review of Systems:    CARDIAC: positive for HTN  RESP: positive for cough and SOB, JENNIFER  NEURO:  negative  INCISION: Clean, dry, and intact  EXT: Denies new swelling or pain in the legs or calves. Objective:     Blood pressure 150/69, pulse (!) 127, temperature 97.8 °F (36.6 °C), resp. rate 20, height 5' (1.524 m), weight 311 lb 8.2 oz (141.3 kg), SpO2 94 %, not currently breastfeeding. Temp (24hrs), Av.1 °F (36.7 °C), Min:97.5 °F (36.4 °C), Max:98.6 °F (37 °C)        Hemodynamics    PAP    CO    CI    No intake/output data recorded.  1901 -  0700  In: -   Out: 124     EXAM:  GENERAL: VSS, afrible, alert and cooperative  HEART:  regular rate and rhythm  LUNG: clear to auscultation bilaterally, O2 sat 94% while at rest on RA but decreases to 86% while talking  NEURO:  normal without focal findings  mental status, speech normal, alert and oriented x iii  INCISION: Clean, dry, and intact  EXTREMITIES:No evidence of DVT seen on physical exam.  GI/: Abd soft, nonterder with + bowel sounds. Voiding    Labs:No results found for this or any previous visit (from the past 24 hour(s)). Assessment:   No evidence of DVT. Active Problems:    ILD (interstitial lung disease) ([facility] (3/25/2019)      PATH:  Lipoid pneumonia                                   Plan/Recommendations:   Discharge home today  Strongly encouraged to follow up with Dr Arturo Matson for bariatric surgery evaluation.     See orders    Signed By: Yeni HOGAN

## 2019-03-28 NOTE — DISCHARGE SUMMARY
Physician Discharge Summary     Patient ID:  Grupo Hickman  [id number]  69 y.o.  1965    Admit Date: 3/25/2019    Discharge Date: 3/28/2019    Admission Diagnoses: ILD (interstitial lung disease) ([facility] [J84.9]    Discharge Diagnoses:  Lipoid pneumonia     Admission Condition: Fair    Discharge Condition: Fair    Last Procedure: Procedure(s):  LEFT VATS, LT MINI THOROCOTOMY,  LUNG BIOPSY X 2      Hospital Course:   Normal hospital course for this procedure. Consults: General Surgery, Case Management, PT/OT    Significant Diagnostic Studies: labs, radiology: CXR and cardiac graphics: Telemetry  PATH:  Lipoid pneumonia  Disposition: home    Patient Instructions:   Current Discharge Medication List      START taking these medications    Details   oxyCODONE-acetaminophen (PERCOCET) 5-325 mg per tablet Take 1-2 Tabs by mouth every four (4) hours as needed for Pain for up to 14 days. Max Daily Amount: 12 Tabs. Qty: 60 Tab, Refills: 0    Associated Diagnoses: Post-op pain      senna-docusate (PERICOLACE) 8.6-50 mg per tablet Take 1 Tab by mouth daily. Indications: constipation  Qty: 30 Tab, Refills: 1      prochlorperazine (COMPAZINE) 5 mg tablet Take 1 Tab by mouth every six (6) hours as needed for Nausea for up to 7 days. Indications: Nausea and Vomiting  Qty: 30 Tab, Refills: 0         CONTINUE these medications which have NOT CHANGED    Details   cloNIDine HCl (CATAPRES) 0.1 mg tablet TAKE 1 TABLET EVERY 8 HOURS ORALLY; TAKING TWICE DAILY. Refills: 1      DILT- mg XR capsule TAKE 1 CAPSULE BY MOUTH EVERY DAY ON EMPTY STOMACH IN THE MORNING  Refills: 2      enalapril (VASOTEC) 20 mg tablet TAKE 1 TABLET BY MOUTH EVERY DAY  Refills: 1      hydrALAZINE (APRESOLINE) 10 mg tablet as needed (TAKING WHEN BP IS ELEVATED > 694 SYSTOLIC; > 421 DIASTOLIC).       albuterol-ipratropium (DUO-NEB) 2.5 mg-0.5 mg/3 ml nebu USE 1 VIAL VIA NEBULIZER EVERY 6 HOURS  Refills: 5      predniSONE (DELTASONE) 10 mg tablet TAKE 3 TABLETS BY MOUTH DAILY  Refills: 2      amLODIPine (NORVASC) 5 mg tablet Take 1 Tab by mouth daily. Qty: 30 Tab, Refills: 0      olopatadine (PAZEO) 0.7 % drop Administer 1 Drop to both eyes daily. raNITIdine (ZANTAC) 150 mg tablet Take 150 mg by mouth daily as needed. Indications: gastroesophageal reflux disease      albuterol (PROVENTIL HFA, VENTOLIN HFA) 90 mcg/actuation inhaler Take 2 Puffs by inhalation every six (6) hours as needed for Wheezing. Qty: 1 Inhaler, Refills: 1    Associated Diagnoses: Asthma      aspirin 81 mg chewable tablet Take 81 mg by mouth daily. melatonin 5 mg cap capsule Take 5 mg by mouth nightly as needed. ergocalciferol (ERGOCALCIFEROL) 50,000 unit capsule TAKE 1 CAPSULE BY MOUTH ONCE A WEEK  Refills: 0      metFORMIN ER (GLUCOPHAGE XR) 500 mg tablet TAKE 2 TABLETS BY MOUTH TWICE A DAY  Refills: 2      repaglinide (PRANDIN) 1 mg tablet TAKE 1 TABLET BY MOUTH TWICE A DAY 15-30 MINUTES BEFORE MEALS  Refills: 2      trimethoprim-sulfamethoxazole (BACTRIM DS, SEPTRA DS) 160-800 mg per tablet TAKE 1 TABLET BY MOUTH 3 TIMES A WEEK, MONDAY, WEDNESDAY, & FRIDAY  Refills: 2      therapeutic multivitamin (THERAGRAN) tablet Take 1 Tab by mouth daily.            Activity: Activity as tolerated and No lifting, Driving, or Strenuous exercise for 2-3 weeks  Diet: Diabetic Diet  Wound Care: As directed    Follow-up with Waleska Vásquez NP on 4/10/19 at 1330  Follow-up tests/labs CXR    Sign:  Cory Valerio NP

## 2019-03-28 NOTE — PROGRESS NOTES
900 am.  Patient not on NC O2 this am. Room air sats 950-100%. Ambulated on Room Air with sats ranging 90-95%.

## 2019-03-28 NOTE — PROGRESS NOTES
CM noted order for home oxygen, however her O2 sats ranged from 90-95% walking without oxygen. Patient did not meet home oxygen criteria. No further needs identified.  Abbey Dacosta MSA, RN,CRM

## 2019-03-28 NOTE — DISCHARGE INSTRUCTIONS
*DISCHARGE INSTRUCTIONS AFTER LUNG [address] [facility]  [address] [facility], [address] [place] [address]  [phone]    Leave the chest tube dressing in place for 48 hours (Friday 3/29/2019), then you can remove it and shower. SURGICAL INCISION:    You will have two or three small incisions. These incisions are sealed with sutures that dissolve. The lower incision is from the chest tube. This lower incision will seal itself in 5 to 7 days. Until then you may have drainage from that incision. The drainage will be thin yellowish or red in color and may drain for 5 to 7 days. This is normal and expected. INCISION CARE:    Wash incisions daily with soap. Pat dry. Showers only, no tub baths. If you have drainage, you can place a bandage over the area, otherwise keep open to air. You may experience drainage of clear-strawberry colored fluid from the chest tube site. This is to be expected and will stop in 24-48 hours. PAIN:    What you will experience:     Tenderness and soreness around incisions   Burning and/or numbness   Soreness around front/back of chest    For relief of discomfort:     Take the pain medicine you were given at discharge   Aleve one or two tablets every 12 hrs (with food) along with pain medicine (this can be purchased over the counter at your local pharmacy/drug store). Advil may be substituted. Start this after you finish taking Toradol if prescribed.  Heating pad 10 minutes at a time to the upper incision area as often as you wish.  For the Ladies: Spandex or elastic sports bra will give you the support you need without pressure on the incision. Most will find this to be a great comfort. COUGHING:    Coughing is helpful after lung surgery. Place a pillow over your incision and apply pressure when coughing to reduce pain. ACTIVITY:    DO: 1. Walk daily outside if weather permits, at a mall if not.  Increase your distance each day. Exercise has many benefits. It promotes healing, expands your lungs,                helps you cough, relaxes your body, tones muscles, lowers blood pressure and               improves your appetite. After you exercise expect to feel tired and probably short                of breath. Plan to take a nap 1-2 times a day to regain your strength . 2. Climb stairs           3. Ride in a car           4. Perform breathing exercises (incentive spirometer)    DO NOT:               1. Lift heavy objects (8-10 pounds)  2. Drive a car/truck until after your post-op visit  3. Do heavy yard or housework     APPETITE:    It is usual to have a decreased appetite after surgery. Exercise is the best stimulant. You may expect to slowly improve over 4-10 days. CALL THE OFFICE IF YOU DEVELOP:     Increasing pain that is not controlled by the pain medicine.  Increasing redness or drainage around the incision.  Unusual or increasing shortness of breath   Fever greater than 101 degrees   Change in the color of your sputum to yellow or green, especially if you also have increasing shortness of breath and a fever greater than 101. YOUR MEDICATIONS:  As instructed        FOLLOW-UP APPOINTMENT:  YOUR FOLLOW UP APPOINTMENT IS ON April 10, 2019 AT 1:30 PM. Please arrive 45 minutes prior to you appointment time in order to have a chest X-Ray. Check in at [address] [facility] on the ground floor of the [facility]. After your X-ray come to the [address] [place] [address] for your appointment.       Physician Signature      DISCHARGE SUMMARY from Nurse    PATIENT INSTRUCTIONS:    After general anesthesia or intravenous sedation, for 24 hours or while taking prescription Narcotics:  · Limit your activities  · Do not drive and operate hazardous machinery  · Do not make important personal or business decisions  · Do  not drink alcoholic beverages  · If you have not urinated within 8 hours after discharge, please contact your surgeon on call. Report the following to your surgeon:  · Excessive pain, swelling, redness or odor of or around the surgical area  · Temperature over 100.5  · Nausea and vomiting lasting longer than 4 hours or if unable to take medications  · Any signs of decreased circulation or nerve impairment to extremity: change in color, persistent  numbness, tingling, coldness or increase pain  · Any questions    What to do at Home:    *  Please give a list of your current medications to your Primary Care Provider. *  Please update this list whenever your medications are discontinued, doses are      changed, or new medications (including over-the-counter products) are added. *  Please carry medication information at all times in case of emergency situations. These are general instructions for a healthy lifestyle:    No smoking/ No tobacco products/ Avoid exposure to second hand smoke  Surgeon General's Warning:  Quitting smoking now greatly reduces serious risk to your health. Obesity, smoking, and sedentary lifestyle greatly increases your risk for illness    A healthy diet, regular physical exercise & weight monitoring are important for maintaining a healthy lifestyle    You may be retaining fluid if you have a history of heart failure or if you experience any of the following symptoms:  Weight gain of 3 pounds or more overnight or 5 pounds in a week, increased swelling in our hands or feet or shortness of breath while lying flat in bed. Please call your doctor as soon as you notice any of these symptoms; do not wait until your next office visit. Recognize signs and symptoms of STROKE:    F-face looks uneven    A-arms unable to move or move unevenly    S-speech slurred or non-existent    T-time-call 911 as soon as signs and symptoms begin-DO NOT go       Back to bed or wait to see if you get better-TIME IS BRAIN.     Warning Signs of HEART ATTACK     Call 911 if you have these symptoms:   Chest discomfort. Most heart attacks involve discomfort in the center of the chest that lasts more than a few minutes, or that goes away and comes back. It can feel like uncomfortable pressure, squeezing, fullness, or pain.  Discomfort in other areas of the upper body. Symptoms can include pain or discomfort in one or both arms, the back, neck, jaw, or stomach.  Shortness of breath with or without chest discomfort.  Other signs may include breaking out in a cold sweat, nausea, or lightheadedness. Don't wait more than five minutes to call 911 - MINUTES MATTER! Fast action can save your life. Calling 911 is almost always the fastest way to get lifesaving treatment. Emergency Medical Services staff can begin treatment when they arrive -- up to an hour sooner than if someone gets to the hospital by car. The discharge information has been reviewed with the patient. The patient verbalized understanding. Discharge medications reviewed with the patient and appropriate educational materials and side effects teaching were provided.

## 2019-03-28 NOTE — PROGRESS NOTES
Physical Therapy    Reviewed chart and noted scheduled discharge. Pt is expressing no concerns with discharge. Pt is pleased she was able to ambulate on room air without difficulties. Pt does not need any PT follow up at discharge.

## 2019-03-29 ENCOUNTER — TELEPHONE (OUTPATIENT)
Dept: SURGERY | Age: 54
End: 2019-03-29

## 2019-03-29 DIAGNOSIS — G89.18 POST-OP PAIN: Primary | ICD-10-CM

## 2019-03-29 DIAGNOSIS — J69.1 LIPOID PNEUMONIA (HCC): Primary | ICD-10-CM

## 2019-03-29 LAB
BACTERIA SPEC CULT: NORMAL
GRAM STN SPEC: NORMAL
GRAM STN SPEC: NORMAL
SERVICE CMNT-IMP: NORMAL

## 2019-03-29 RX ORDER — KETOROLAC TROMETHAMINE 10 MG/1
10 TABLET, FILM COATED ORAL EVERY 6 HOURS
Qty: 20 TAB | Refills: 0 | Status: SHIPPED | OUTPATIENT
Start: 2019-03-29 | End: 2019-04-03

## 2019-03-29 NOTE — TELEPHONE ENCOUNTER
Patient stated that she ripped up the Rx for Percocet. She does not want narcotics. She wants an Rx for Toradol, like she had in the hospital.    Her , Radha Mckeon will come  the Rx if it cannot be e-scribed. I advised the patient to take 2 Aleve with food to tide her over until she can get the Toradol Rx. The patient verbalized understanding. She has no known drug allergies. She also stated that she wants to come in to see Alecia Nichole on Wednesday, 4/3 so she can go back to work sooner if possible. She s/w Dr. Jersey Garnica. 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 I switched her appointment. I will call her back once I s/w Alecia Nichole about the Toradol.

## 2019-03-29 NOTE — TELEPHONE ENCOUNTER
Called patient back. Advised her that the Rx for Toradol was able to be e-scribed and as sent to her pharmacy. The patient verbalized understanding.

## 2019-03-29 NOTE — TELEPHONE ENCOUNTER
Patient called to see if she could get a different pain medication, she was just discharged 3/25. Please call her.

## 2019-03-31 LAB
BACTERIA SPEC CULT: ABNORMAL
BACTERIA SPEC CULT: ABNORMAL
SERVICE CMNT-IMP: ABNORMAL

## 2019-04-03 ENCOUNTER — OFFICE VISIT (OUTPATIENT)
Dept: SURGERY | Age: 54
End: 2019-04-03

## 2019-04-03 ENCOUNTER — HOSPITAL ENCOUNTER (OUTPATIENT)
Dept: GENERAL RADIOLOGY | Age: 54
Discharge: HOME | End: 2019-04-03
Payer: COMMERCIAL

## 2019-04-03 VITALS
HEIGHT: 60 IN | SYSTOLIC BLOOD PRESSURE: 103 MMHG | OXYGEN SATURATION: 96 % | BODY MASS INDEX: 57.52 KG/M2 | TEMPERATURE: 98 F | WEIGHT: 293 LBS | DIASTOLIC BLOOD PRESSURE: 64 MMHG | HEART RATE: 110 BPM | RESPIRATION RATE: 20 BRPM

## 2019-04-03 DIAGNOSIS — J84.9 ILD (INTERSTITIAL LUNG DISEASE) (HCC): Primary | ICD-10-CM

## 2019-04-03 DIAGNOSIS — J69.1 LIPOID PNEUMONIA (HCC): ICD-10-CM

## 2019-04-03 PROCEDURE — 71046 X-RAY EXAM CHEST 2 VIEWS: CPT

## 2019-04-03 RX ORDER — BUMETANIDE 2 MG/1
2 TABLET ORAL DAILY
COMMUNITY
Start: 2019-03-19 | End: 2020-01-17

## 2019-04-03 NOTE — PROGRESS NOTES
Subjective:      Gifty Johnson is a 48 y.o. female presents for postop care . Procedure:  3/25/19 Left VATS, left mini thoracoscopy, lung biopsy x2 (left lingula and left lower lobe). Path: Lipoid pneumonia     Appetite is fair. Eating a regular diet without difficulty. Bowel movements are regular. The patient is voiding without difficulty. Pain is controlled with current analgesics. Medication(s) being used: acetaminophen, ibuprofen (OTC). .    Objective:     Visit Vitals  /64 (BP 1 Location: Left arm, BP Patient Position: Sitting)   Pulse (!) 110   Temp 98 °F (36.7 °C) (Oral)   Resp 20   Ht 5' (1.524 m)   Wt 311 lb (141.1 kg)   SpO2 96%   BMI 60.74 kg/m²       Physical Exam:  GENERAL: alert, cooperative, no distress, appears stated age, LUNG: clear to auscultation bilaterally, HEART: regular rate and rhythm, ABDOMEN: soft, non-tender. Bowel sounds normal. No masses,  no organomegaly, EXTREMITIES:  extremities normal, atraumatic, no cyanosis or edema, SKIN: Normal.    Data Review images and reports reviewed    Assessment:     Patient Active Problem List   Diagnosis Code    Asthma Z94.068    Diastolic murmur I81    Obesity E66.9    Multifocal pneumonia J18.9    Asthma with exacerbation J45. 0    Hypertension I10    Severe sepsis (HCC) A41.9, R65.20    ILD (interstitial lung disease) ([facility] J84.9       Doing well postoperatively. All incisions are healing well. She is able to wear a bar. She wants to go back to work on 4/8/19. I suggested she start back half time and increase up to full days as she recovers. States she saw Sinclair Schaumann NP with Dr Tj Contreras last week and has follow up scheduled. She said prior to surgery she had been using peppermint oil to suppress her cough for several weeks, and was told that was the source of the pneumonia. We discussed following up with Dr Purvi Mortensen for weight loss management and she said she will schedule an appointment soon. Plan/Recommendations/Medical Decision Making:      Daniella Jernigan will be seen as needed. She will [facility]. Encouraged to follow up with Dr Michael Allen. We discussed the importance of proper diet and 30 minutes/day of proper exercise. All questions were personally answered. Thank you for allowing us to participate in the care of your patient.     [address]  Thoracic Surgery

## 2019-04-03 NOTE — PROGRESS NOTES
1. Have you been to the ER, urgent care clinic since your last visit? Hospitalized since your last visit? No    2. Have you seen or consulted any other health care providers outside of the [address] [place] [address] since your last visit? Include any pap smears or colon screening.  No

## 2019-04-03 NOTE — LETTER
NOTIFICATION OF RETURN TO WORK 
4/3/2019 Ms. Lina Cheney 1 [address] To Whom It May Concern: 
 
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 She can increase up to full days when physically able. She should not do any heavy lifting for three more weeks. If there are questions or concerns please have the patient contact our office. Sincerely, Yossi Moe NP

## 2019-04-22 LAB
BACTERIA SPEC CULT: NORMAL
BACTERIA SPEC CULT: NORMAL
SERVICE CMNT-IMP: NORMAL
SERVICE CMNT-IMP: NORMAL

## 2019-04-24 ENCOUNTER — TELEPHONE (OUTPATIENT)
Dept: SURGERY | Age: 54
End: 2019-04-24

## 2019-04-24 NOTE — TELEPHONE ENCOUNTER
Patient returned my call. I advised her, as per Dr. Johnna Fine, that she can return the gym whenever she wants to. She can also start using weights. I also reminded her to call and schedule an appointment with Dr. Zofia Hampton (bariatric surgeon). The patient verbalized understanding of all.

## 2019-05-07 LAB
ACID FAST STN SPEC: NEGATIVE
MYCOBACTERIUM SPEC QL CULT: NEGATIVE
SPECIMEN PREPARATION: NORMAL
SPECIMEN SOURCE: NORMAL

## 2019-12-13 ENCOUNTER — HOSPITAL ENCOUNTER (OUTPATIENT)
Dept: CT IMAGING | Age: 54
Discharge: HOME | End: 2019-12-13
Attending: INTERNAL MEDICINE
Payer: COMMERCIAL

## 2019-12-13 DIAGNOSIS — J69.1 LIPOID PNEUMONIA (EXOGENOUS) (HCC): ICD-10-CM

## 2019-12-13 PROCEDURE — 71250 CT THORAX DX C-: CPT

## 2020-01-03 ENCOUNTER — HOSPITAL ENCOUNTER (OUTPATIENT)
Dept: MAMMOGRAPHY | Age: 55
Discharge: HOME | End: 2020-01-03
Attending: FAMILY MEDICINE
Payer: COMMERCIAL

## 2020-01-03 DIAGNOSIS — Z12.31 VISIT FOR SCREENING MAMMOGRAM: ICD-10-CM

## 2020-01-03 PROCEDURE — 77067 SCR MAMMO BI INCL CAD: CPT

## 2020-01-17 ENCOUNTER — APPOINTMENT (OUTPATIENT)
Dept: GENERAL RADIOLOGY | Age: 55
End: 2020-01-17
Attending: NURSE PRACTITIONER
Payer: COMMERCIAL

## 2020-01-17 ENCOUNTER — HOSPITAL ENCOUNTER (OUTPATIENT)
Age: 55
Setting detail: OBSERVATION
Discharge: HOME | End: 2020-01-18
Attending: EMERGENCY MEDICINE | Admitting: INTERNAL MEDICINE
Payer: COMMERCIAL

## 2020-01-17 DIAGNOSIS — R53.83 FATIGUE, UNSPECIFIED TYPE: ICD-10-CM

## 2020-01-17 DIAGNOSIS — R11.2 NAUSEA AND VOMITING, INTRACTABILITY OF VOMITING NOT SPECIFIED, UNSPECIFIED VOMITING TYPE: Primary | ICD-10-CM

## 2020-01-17 PROBLEM — R50.9 FEVER: Status: ACTIVE | Noted: 2020-01-17

## 2020-01-17 PROBLEM — E11.9 TYPE 2 DIABETES MELLITUS, WITHOUT LONG-TERM CURRENT USE OF INSULIN (HCC): Status: ACTIVE | Noted: 2020-01-17

## 2020-01-17 LAB
ALBUMIN SERPL-MCNC: 3.7 G/DL (ref 3.5–5)
ALBUMIN/GLOB SERPL: 0.8 {RATIO} (ref 1.1–2.2)
ALP SERPL-CCNC: 145 U/L (ref 45–117)
ALT SERPL-CCNC: 37 U/L (ref 12–78)
ANION GAP SERPL CALC-SCNC: 7 MMOL/L (ref 5–15)
APPEARANCE UR: CLEAR
AST SERPL-CCNC: 33 U/L (ref 15–37)
ATRIAL RATE: 137 BPM
B PERT DNA SPEC QL NAA+PROBE: NOT DETECTED
BACTERIA URNS QL MICRO: ABNORMAL /HPF
BASOPHILS # BLD: 0.1 K/UL (ref 0–0.1)
BASOPHILS NFR BLD: 0 % (ref 0–1)
BILIRUB SERPL-MCNC: 0.4 MG/DL (ref 0.2–1)
BILIRUB UR QL: NEGATIVE
BORDETELLA PARAPERTUSSIS PCR, BORPAR: NOT DETECTED
BUN SERPL-MCNC: 10 MG/DL (ref 6–20)
BUN/CREAT SERPL: 9 (ref 12–20)
C PNEUM DNA SPEC QL NAA+PROBE: NOT DETECTED
CALCIUM SERPL-MCNC: 9.1 MG/DL (ref 8.5–10.1)
CALCULATED R AXIS, ECG10: -9 DEGREES
CALCULATED T AXIS, ECG11: 17 DEGREES
CHLORIDE SERPL-SCNC: 101 MMOL/L (ref 97–108)
CO2 SERPL-SCNC: 29 MMOL/L (ref 21–32)
COLOR UR: ABNORMAL
COMMENT, HOLDF: NORMAL
CREAT SERPL-MCNC: 1.06 MG/DL (ref 0.55–1.02)
DIAGNOSIS, 93000: NORMAL
DIFFERENTIAL METHOD BLD: ABNORMAL
EOSINOPHIL # BLD: 0.1 K/UL (ref 0–0.4)
EOSINOPHIL NFR BLD: 0 % (ref 0–7)
EPITH CASTS URNS QL MICRO: ABNORMAL /LPF
ERYTHROCYTE [DISTWIDTH] IN BLOOD BY AUTOMATED COUNT: 12.9 % (ref 11.5–14.5)
EST. AVERAGE GLUCOSE BLD GHB EST-MCNC: 117 MG/DL
FLUAV AG NPH QL IA: NEGATIVE
FLUAV H1 2009 PAND RNA SPEC QL NAA+PROBE: NOT DETECTED
FLUAV H1 RNA SPEC QL NAA+PROBE: NOT DETECTED
FLUAV H3 RNA SPEC QL NAA+PROBE: NOT DETECTED
FLUAV SUBTYP SPEC NAA+PROBE: NOT DETECTED
FLUBV AG NOSE QL IA: NEGATIVE
FLUBV RNA SPEC QL NAA+PROBE: NOT DETECTED
GLOBULIN SER CALC-MCNC: 4.7 G/DL (ref 2–4)
GLUCOSE BLD STRIP.AUTO-MCNC: 93 MG/DL (ref 65–100)
GLUCOSE SERPL-MCNC: 96 MG/DL (ref 65–100)
GLUCOSE UR STRIP.AUTO-MCNC: NEGATIVE MG/DL
HADV DNA SPEC QL NAA+PROBE: NOT DETECTED
HBA1C MFR BLD: 5.7 % (ref 4–5.6)
HCOV 229E RNA SPEC QL NAA+PROBE: NOT DETECTED
HCOV HKU1 RNA SPEC QL NAA+PROBE: NOT DETECTED
HCOV NL63 RNA SPEC QL NAA+PROBE: NOT DETECTED
HCOV OC43 RNA SPEC QL NAA+PROBE: NOT DETECTED
HCT VFR BLD AUTO: 37.6 % (ref 35–47)
HGB BLD-MCNC: 12.3 G/DL (ref 11.5–16)
HGB UR QL STRIP: NEGATIVE
HMPV RNA SPEC QL NAA+PROBE: NOT DETECTED
HPIV1 RNA SPEC QL NAA+PROBE: NOT DETECTED
HPIV2 RNA SPEC QL NAA+PROBE: NOT DETECTED
HPIV3 RNA SPEC QL NAA+PROBE: NOT DETECTED
HPIV4 RNA SPEC QL NAA+PROBE: NOT DETECTED
IMM GRANULOCYTES # BLD AUTO: 0.1 K/UL (ref 0–0.04)
IMM GRANULOCYTES NFR BLD AUTO: 0 % (ref 0–0.5)
KETONES UR QL STRIP.AUTO: NEGATIVE MG/DL
LACTATE BLD-SCNC: 1.15 MMOL/L (ref 0.4–2)
LEUKOCYTE ESTERASE UR QL STRIP.AUTO: NEGATIVE
LYMPHOCYTES # BLD: 3 K/UL (ref 0.8–3.5)
LYMPHOCYTES NFR BLD: 18 % (ref 12–49)
M PNEUMO DNA SPEC QL NAA+PROBE: NOT DETECTED
MCH RBC QN AUTO: 29.6 PG (ref 26–34)
MCHC RBC AUTO-ENTMCNC: 32.7 G/DL (ref 30–36.5)
MCV RBC AUTO: 90.4 FL (ref 80–99)
MONOCYTES # BLD: 1.3 K/UL (ref 0–1)
MONOCYTES NFR BLD: 7 % (ref 5–13)
NEUTS SEG # BLD: 12.5 K/UL (ref 1.8–8)
NEUTS SEG NFR BLD: 75 % (ref 32–75)
NITRITE UR QL STRIP.AUTO: NEGATIVE
NRBC # BLD: 0 K/UL (ref 0–0.01)
NRBC BLD-RTO: 0 PER 100 WBC
PH UR STRIP: 8 [PH] (ref 5–8)
PLATELET # BLD AUTO: 394 K/UL (ref 150–400)
PMV BLD AUTO: 9.9 FL (ref 8.9–12.9)
POTASSIUM SERPL-SCNC: 3.8 MMOL/L (ref 3.5–5.1)
PROT SERPL-MCNC: 8.4 G/DL (ref 6.4–8.2)
PROT UR STRIP-MCNC: NEGATIVE MG/DL
Q-T INTERVAL, ECG07: 382 MS
QRS DURATION, ECG06: 68 MS
QTC CALCULATION (BEZET), ECG08: 576 MS
RBC # BLD AUTO: 4.16 M/UL (ref 3.8–5.2)
RBC #/AREA URNS HPF: ABNORMAL /HPF (ref 0–5)
RSV RNA SPEC QL NAA+PROBE: NOT DETECTED
RV+EV RNA SPEC QL NAA+PROBE: NOT DETECTED
SAMPLES BEING HELD,HOLD: NORMAL
SERVICE CMNT-IMP: NORMAL
SODIUM SERPL-SCNC: 137 MMOL/L (ref 136–145)
SP GR UR REFRACTOMETRY: 1.02 (ref 1–1.03)
TSH SERPL DL<=0.05 MIU/L-ACNC: 0.98 UIU/ML (ref 0.36–3.74)
UA: UC IF INDICATED,UAUC: ABNORMAL
UR CULT HOLD, URHOLD: NORMAL
UROBILINOGEN UR QL STRIP.AUTO: 0.2 EU/DL (ref 0.2–1)
VENTRICULAR RATE, ECG03: 137 BPM
WBC # BLD AUTO: 16.9 K/UL (ref 3.6–11)
WBC URNS QL MICRO: ABNORMAL /HPF (ref 0–4)

## 2020-01-17 PROCEDURE — 99218 HC RM OBSERVATION: CPT

## 2020-01-17 PROCEDURE — 93005 ELECTROCARDIOGRAM TRACING: CPT

## 2020-01-17 PROCEDURE — 87040 BLOOD CULTURE FOR BACTERIA: CPT

## 2020-01-17 PROCEDURE — 96365 THER/PROPH/DIAG IV INF INIT: CPT

## 2020-01-17 PROCEDURE — 96375 TX/PRO/DX INJ NEW DRUG ADDON: CPT

## 2020-01-17 PROCEDURE — 74011250636 HC RX REV CODE- 250/636: Performed by: NURSE PRACTITIONER

## 2020-01-17 PROCEDURE — 80053 COMPREHEN METABOLIC PANEL: CPT

## 2020-01-17 PROCEDURE — 83036 HEMOGLOBIN GLYCOSYLATED A1C: CPT

## 2020-01-17 PROCEDURE — 71045 X-RAY EXAM CHEST 1 VIEW: CPT

## 2020-01-17 PROCEDURE — 74011250636 HC RX REV CODE- 250/636: Performed by: INTERNAL MEDICINE

## 2020-01-17 PROCEDURE — 74011250637 HC RX REV CODE- 250/637: Performed by: FAMILY MEDICINE

## 2020-01-17 PROCEDURE — 82962 GLUCOSE BLOOD TEST: CPT

## 2020-01-17 PROCEDURE — 74011000250 HC RX REV CODE- 250: Performed by: INTERNAL MEDICINE

## 2020-01-17 PROCEDURE — 81001 URINALYSIS AUTO W/SCOPE: CPT

## 2020-01-17 PROCEDURE — 99285 EMERGENCY DEPT VISIT HI MDM: CPT

## 2020-01-17 PROCEDURE — 84443 ASSAY THYROID STIM HORMONE: CPT

## 2020-01-17 PROCEDURE — 96367 TX/PROPH/DG ADDL SEQ IV INF: CPT

## 2020-01-17 PROCEDURE — 83605 ASSAY OF LACTIC ACID: CPT

## 2020-01-17 PROCEDURE — 85025 COMPLETE CBC W/AUTO DIFF WBC: CPT

## 2020-01-17 PROCEDURE — 0100U RESPIRATORY PANEL,PCR,NASOPHARYNGEAL: CPT

## 2020-01-17 PROCEDURE — 96366 THER/PROPH/DIAG IV INF ADDON: CPT

## 2020-01-17 PROCEDURE — 74011250637 HC RX REV CODE- 250/637: Performed by: INTERNAL MEDICINE

## 2020-01-17 PROCEDURE — 87086 URINE CULTURE/COLONY COUNT: CPT

## 2020-01-17 PROCEDURE — 74011250636 HC RX REV CODE- 250/636: Performed by: FAMILY MEDICINE

## 2020-01-17 PROCEDURE — 65270000029 HC RM PRIVATE

## 2020-01-17 PROCEDURE — 36415 COLL VENOUS BLD VENIPUNCTURE: CPT

## 2020-01-17 PROCEDURE — 96372 THER/PROPH/DIAG INJ SC/IM: CPT

## 2020-01-17 PROCEDURE — 87804 INFLUENZA ASSAY W/OPTIC: CPT

## 2020-01-17 PROCEDURE — 96361 HYDRATE IV INFUSION ADD-ON: CPT

## 2020-01-17 RX ORDER — ONDANSETRON 4 MG/1
4 TABLET, ORALLY DISINTEGRATING ORAL
Qty: 8 TAB | Refills: 0 | Status: SHIPPED | OUTPATIENT
Start: 2020-01-17 | End: 2021-03-03

## 2020-01-17 RX ORDER — THERA TABS 400 MCG
1 TAB ORAL DAILY
Status: DISCONTINUED | OUTPATIENT
Start: 2020-01-18 | End: 2020-01-18 | Stop reason: HOSPADM

## 2020-01-17 RX ORDER — FAMOTIDINE 20 MG/1
20 TABLET, FILM COATED ORAL DAILY
COMMUNITY
End: 2021-03-03

## 2020-01-17 RX ORDER — CLONIDINE HYDROCHLORIDE 0.1 MG/1
0.1 TABLET ORAL
Status: DISCONTINUED | OUTPATIENT
Start: 2020-01-17 | End: 2020-01-18 | Stop reason: HOSPADM

## 2020-01-17 RX ORDER — DIPHENHYDRAMINE HYDROCHLORIDE 50 MG/ML
25 INJECTION, SOLUTION INTRAMUSCULAR; INTRAVENOUS ONCE
Status: COMPLETED | OUTPATIENT
Start: 2020-01-17 | End: 2020-01-17

## 2020-01-17 RX ORDER — IPRATROPIUM BROMIDE AND ALBUTEROL SULFATE 2.5; .5 MG/3ML; MG/3ML
3 SOLUTION RESPIRATORY (INHALATION)
Status: DISCONTINUED | OUTPATIENT
Start: 2020-01-17 | End: 2020-01-18 | Stop reason: HOSPADM

## 2020-01-17 RX ORDER — SODIUM CHLORIDE 0.9 % (FLUSH) 0.9 %
5-40 SYRINGE (ML) INJECTION AS NEEDED
Status: DISCONTINUED | OUTPATIENT
Start: 2020-01-17 | End: 2020-01-18 | Stop reason: HOSPADM

## 2020-01-17 RX ORDER — ACETAMINOPHEN 500 MG
1000 TABLET ORAL ONCE
Status: COMPLETED | OUTPATIENT
Start: 2020-01-17 | End: 2020-01-17

## 2020-01-17 RX ORDER — KETOTIFEN FUMARATE 0.35 MG/ML
1 SOLUTION/ DROPS OPHTHALMIC 2 TIMES DAILY
Status: DISCONTINUED | OUTPATIENT
Start: 2020-01-17 | End: 2020-01-18 | Stop reason: HOSPADM

## 2020-01-17 RX ORDER — MAGNESIUM SULFATE 100 %
4 CRYSTALS MISCELLANEOUS AS NEEDED
Status: DISCONTINUED | OUTPATIENT
Start: 2020-01-17 | End: 2020-01-18 | Stop reason: HOSPADM

## 2020-01-17 RX ORDER — SODIUM CHLORIDE 9 MG/ML
100 INJECTION, SOLUTION INTRAVENOUS CONTINUOUS
Status: DISCONTINUED | OUTPATIENT
Start: 2020-01-17 | End: 2020-01-18 | Stop reason: HOSPADM

## 2020-01-17 RX ORDER — DEXTROSE MONOHYDRATE 100 MG/ML
0-250 INJECTION, SOLUTION INTRAVENOUS AS NEEDED
Status: DISCONTINUED | OUTPATIENT
Start: 2020-01-17 | End: 2020-01-18 | Stop reason: HOSPADM

## 2020-01-17 RX ORDER — BENZONATATE 100 MG/1
100 CAPSULE ORAL
Status: DISCONTINUED | OUTPATIENT
Start: 2020-01-17 | End: 2020-01-18 | Stop reason: HOSPADM

## 2020-01-17 RX ORDER — ENOXAPARIN SODIUM 100 MG/ML
40 INJECTION SUBCUTANEOUS EVERY 12 HOURS
Status: DISCONTINUED | OUTPATIENT
Start: 2020-01-17 | End: 2020-01-18 | Stop reason: HOSPADM

## 2020-01-17 RX ORDER — ACETAMINOPHEN 325 MG/1
650 TABLET ORAL
Status: DISCONTINUED | OUTPATIENT
Start: 2020-01-17 | End: 2020-01-18 | Stop reason: HOSPADM

## 2020-01-17 RX ORDER — GUAIFENESIN 100 MG/5ML
81 LIQUID (ML) ORAL DAILY
Status: DISCONTINUED | OUTPATIENT
Start: 2020-01-18 | End: 2020-01-18 | Stop reason: HOSPADM

## 2020-01-17 RX ORDER — TORSEMIDE 20 MG/1
20 TABLET ORAL 2 TIMES DAILY
COMMUNITY

## 2020-01-17 RX ORDER — ONDANSETRON 2 MG/ML
4 INJECTION INTRAMUSCULAR; INTRAVENOUS
Status: DISCONTINUED | OUTPATIENT
Start: 2020-01-17 | End: 2020-01-18 | Stop reason: HOSPADM

## 2020-01-17 RX ORDER — SODIUM CHLORIDE 0.9 % (FLUSH) 0.9 %
5-40 SYRINGE (ML) INJECTION EVERY 8 HOURS
Status: DISCONTINUED | OUTPATIENT
Start: 2020-01-17 | End: 2020-01-18 | Stop reason: HOSPADM

## 2020-01-17 RX ORDER — ERGOCALCIFEROL 1.25 MG/1
50000 CAPSULE ORAL
COMMUNITY
End: 2021-03-03

## 2020-01-17 RX ORDER — ENALAPRIL MALEATE 5 MG/1
5 TABLET ORAL DAILY
Status: DISCONTINUED | OUTPATIENT
Start: 2020-01-18 | End: 2020-01-18 | Stop reason: HOSPADM

## 2020-01-17 RX ORDER — DILTIAZEM HYDROCHLORIDE 240 MG/1
240 CAPSULE, COATED, EXTENDED RELEASE ORAL DAILY
Status: DISCONTINUED | OUTPATIENT
Start: 2020-01-18 | End: 2020-01-18 | Stop reason: HOSPADM

## 2020-01-17 RX ORDER — OLOPATADINE HYDROCHLORIDE 2 MG/ML
1 SOLUTION/ DROPS OPHTHALMIC DAILY
COMMUNITY

## 2020-01-17 RX ORDER — IPRATROPIUM BROMIDE AND ALBUTEROL SULFATE 2.5; .5 MG/3ML; MG/3ML
3 SOLUTION RESPIRATORY (INHALATION)
COMMUNITY

## 2020-01-17 RX ORDER — FAMOTIDINE 20 MG/1
20 TABLET, FILM COATED ORAL DAILY
Status: DISCONTINUED | OUTPATIENT
Start: 2020-01-18 | End: 2020-01-18 | Stop reason: HOSPADM

## 2020-01-17 RX ORDER — CLONIDINE HYDROCHLORIDE 0.1 MG/1
0.1 TABLET ORAL
COMMUNITY

## 2020-01-17 RX ORDER — DIPHENHYDRAMINE HYDROCHLORIDE 50 MG/ML
25 INJECTION, SOLUTION INTRAMUSCULAR; INTRAVENOUS
Status: DISCONTINUED | OUTPATIENT
Start: 2020-01-17 | End: 2020-01-18 | Stop reason: HOSPADM

## 2020-01-17 RX ORDER — INSULIN LISPRO 100 [IU]/ML
INJECTION, SOLUTION INTRAVENOUS; SUBCUTANEOUS
Status: DISCONTINUED | OUTPATIENT
Start: 2020-01-17 | End: 2020-01-18 | Stop reason: HOSPADM

## 2020-01-17 RX ORDER — SODIUM CHLORIDE 0.9 % (FLUSH) 0.9 %
5-10 SYRINGE (ML) INJECTION AS NEEDED
Status: DISCONTINUED | OUTPATIENT
Start: 2020-01-17 | End: 2020-01-18 | Stop reason: HOSPADM

## 2020-01-17 RX ORDER — LEVOFLOXACIN 5 MG/ML
750 INJECTION, SOLUTION INTRAVENOUS EVERY 24 HOURS
Status: DISCONTINUED | OUTPATIENT
Start: 2020-01-17 | End: 2020-01-17

## 2020-01-17 RX ORDER — LEVOFLOXACIN 750 MG/1
750 TABLET ORAL DAILY
COMMUNITY
End: 2021-03-03

## 2020-01-17 RX ADMIN — BENZONATATE 100 MG: 100 CAPSULE ORAL at 20:58

## 2020-01-17 RX ADMIN — SODIUM CHLORIDE 537 ML: 900 INJECTION, SOLUTION INTRAVENOUS at 15:24

## 2020-01-17 RX ADMIN — CLONIDINE HYDROCHLORIDE 0.1 MG: 0.1 TABLET ORAL at 21:09

## 2020-01-17 RX ADMIN — VANCOMYCIN HYDROCHLORIDE 2500 MG: 10 INJECTION, POWDER, LYOPHILIZED, FOR SOLUTION INTRAVENOUS at 19:44

## 2020-01-17 RX ADMIN — ACETAMINOPHEN 1000 MG: 500 TABLET ORAL at 14:25

## 2020-01-17 RX ADMIN — DIPHENHYDRAMINE HYDROCHLORIDE 25 MG: 50 INJECTION, SOLUTION INTRAMUSCULAR; INTRAVENOUS at 22:47

## 2020-01-17 RX ADMIN — LEVOFLOXACIN 750 MG: 5 INJECTION, SOLUTION INTRAVENOUS at 17:49

## 2020-01-17 RX ADMIN — ENOXAPARIN SODIUM 40 MG: 40 INJECTION SUBCUTANEOUS at 21:09

## 2020-01-17 RX ADMIN — SODIUM CHLORIDE 1000 ML: 900 INJECTION, SOLUTION INTRAVENOUS at 13:50

## 2020-01-17 RX ADMIN — MEROPENEM 500 MG: 500 INJECTION, POWDER, FOR SOLUTION INTRAVENOUS at 19:42

## 2020-01-17 RX ADMIN — Medication 10 ML: at 21:09

## 2020-01-17 RX ADMIN — SODIUM CHLORIDE 1000 ML: 900 INJECTION, SOLUTION INTRAVENOUS at 15:24

## 2020-01-17 RX ADMIN — SODIUM CHLORIDE 100 ML/HR: 900 INJECTION, SOLUTION INTRAVENOUS at 19:43

## 2020-01-17 RX ADMIN — ONDANSETRON 4 MG: 2 INJECTION INTRAMUSCULAR; INTRAVENOUS at 20:58

## 2020-01-17 RX ADMIN — DIPHENHYDRAMINE HYDROCHLORIDE 25 MG: 50 INJECTION, SOLUTION INTRAMUSCULAR; INTRAVENOUS at 14:25

## 2020-01-17 NOTE — H&P
Admission History and Physical      NAME:  Rachel Griffith   :   1965   MRN:  [id number]     PCP:  Leidy Mcfarland MD     Date/Time:  2020           Assessment/Plan:       Fever (2020): Suspect respiratory given nonprod cough, mild OAKLEY, prior hx of lung disease. UA with bacteruria, but asymptomatic. Has leukocytosis, tachycardia, tachypnea, and fever. However, patient is not toxic or ill appearing. Lactate WNL and no other end organ injury to suggest sepsis. -- await blood cultures and urine culture  -- await respiratory viral panel  -- continue empiric meropenem/vanc  -- stop levaquin for QTc prolongation    Asthma (2013)/ILD (interstitial lung disease) ([facility] (3/25/2019): Not wheezing currently. Followed by pulmonary due to multilobar pneumonia and eventual lipoid pneumonia. -- nebs prn  -- consider repeat CT if not improved    Hypertension (10/14/2018):  -- resume diltiazem, enalapril, clonidine    Type 2 diabetes mellitus, without long-term current use of insulin ([facility] ():  -- hold trulicity and metformin in case need for CT  -- add SSI    Rash:  Unclear etiology. Not itchy. Only new med was ceftriaxone and levaquin this week. Has tolerated both in past w/o rashes. -- monitor for worsening  -- stop levaquin    QTc prolongation:  -- monitor on tele  -- hold levaquin       Subjective:     CHIEF COMPLAINT:  Fever. HISTORY OF PRESENT ILLNESS:     Ms. Jeanette Huber is a 48 y.o.  female who is admitted with Fever. Ms. Jeanette Huber presented to the Emergency Department today complaining of fever since earlier in the week. Was seen at [facility] and given ceftriaxone x 1 and RX levaquin for possible pneumonia. Continues to have fever to 103 earlier in the week and 102 today. Has mild sob with activity. Mild cough, but not productive. No known sick contacts. No dysuria. No diarrhea. Had nausea and vomiting this AM after drinking ginger ale.   Has not tried to eat or drink anything since. Recommended ED visit per pulmonologist for evaluation. Past Medical History:   Diagnosis Date    Arrhythmia     tachycardia  (Zara Pate)    Asthma 2013    inhaler used    Diabetes ([facility]     pre diabetic per patient     Dyslipidemia     GERD (gastroesophageal reflux disease)     Hypertension     Obesity 2014    JENNIFER on CPAP     Pneumonia 10/2018    RA (rheumatoid arthritis) ([facility]     KNEES    Sepsis ([facility] 10/2018        Past Surgical History:   Procedure Laterality Date    ENDOSCOPY, COLON, DIAGNOSTIC      HX  SECTION      x2    HX CHOLECYSTECTOMY  2006    HX HYSTERECTOMY  1997    complete, endometriosis.  IR BRONCHOSCOPY  2018       Social History     Tobacco Use    Smoking status: Never Smoker    Smokeless tobacco: Never Used   Substance Use Topics    Alcohol use: No        Family History   Problem Relation Age of Onset    Other Mother         CAD    Hypertension Mother     Hypertension Father     Kidney Disease Father         End Stage Renal, Dialysis    Hypertension Brother     Diabetes Paternal Uncle     Other Maternal Grandmother         CAD    Heart Attack Maternal Grandmother     Cancer Maternal Grandfather         Throat Cancer    Dementia Paternal Grandmother         Alzheimer's Dementia    Cancer Paternal Grandfather         Colon Cancer    Breast Cancer Maternal Aunt 62        mothers 1/2 sister    Diabetes Paternal Uncle     Anesth Problems Neg Hx         No Known Allergies     Prior to Admission medications    Medication Sig Start Date End Date Taking? Authorizing Provider   albuterol-ipratropium (DUO-NEB) 2.5 mg-0.5 mg/3 ml nebu 3 mL by Nebulization route every six (6) hours as needed for Wheezing. Yes Provider, Historical   cloNIDine HCL (CATAPRES) 0.1 mg tablet Take 0.1 mg by mouth nightly.    Yes Provider, Historical   ergocalciferol (ERGOCALCIFEROL) 1,250 mcg (50,000 unit) capsule Take 50,000 Units by mouth every Monday. Yes Provider, Historical   dulaglutide (TRULICITY) 1.5 OC/6.6 mL sub-q pen 1.5 mg by SubCUTAneous route every Tuesday. Yes Provider, Historical   olopatadine (PATADAY) 0.2 % drop ophthalmic solution Administer 1 Drop to both eyes daily. Yes Provider, Historical   famotidine (PEPCID) 20 mg tablet Take 20 mg by mouth daily. Yes Provider, Historical   torsemide (DEMADEX) 20 mg tablet Take 20 mg by mouth two (2) times a day. Yes Provider, Historical   levoFLOXacin (LEVAQUIN) 750 mg tablet Take 750 mg by mouth daily. Yes Provider, Historical   ondansetron (ZOFRAN ODT) 4 mg disintegrating tablet Take 1 Tab by mouth every eight (8) hours as needed for Nausea or Vomiting. 1/17/20  Yes Rosy Bryan MD   aspirin 81 mg chewable tablet Take 81 mg by mouth daily. Yes Provider, Historical   DILT- mg XR capsule TAKE 1 CAPSULE BY MOUTH EVERY DAY ON EMPTY STOMACH IN THE MORNING 12/19/18  Yes Provider, Historical   enalapril (VASOTEC) 5 mg tablet Take 5 mg by mouth daily. 12/15/18  Yes Provider, Historical   metFORMIN ER (GLUCOPHAGE XR) 500 mg tablet TAKE 2 TABLETS BY MOUTH TWICE A DAY 12/19/18  Yes Provider, Historical   therapeutic multivitamin (THERAGRAN) tablet Take 1 Tab by mouth daily. Yes Provider, Historical   albuterol (PROVENTIL HFA, VENTOLIN HFA) 90 mcg/actuation inhaler Take 2 Puffs by inhalation every six (6) hours as needed for Wheezing.  3/1/14  Yes Roseann Thao MD         Review of Systems:  (bold if positive, if negative)    Gen:  feverEyes:  ENT:  [facility]:  Pulm:  Cough, dyspneaGI:   nausea, emesis  :    MS:  Skin:  Endo:    Hem:  Renal:    Neuro:            Objective:      VITALS:    Vital signs reviewed; most recent are:    Visit Vitals  /82 (BP 1 Location: Right arm, BP Patient Position: At rest)   Pulse (!) 111   Temp 99.6 °F (37.6 °C)   Resp 16   Ht 5' (1.524 m)   Wt 117.9 kg (260 lb)   SpO2 99%   BMI 50.78 kg/m²     SpO2 Readings from Last 6 Encounters:   01/17/20 99%   04/03/19 96%   03/28/19 94%   03/19/19 99%   02/12/19 96%   12/05/18 98%            Intake/Output Summary (Last 24 hours) at 1/17/2020 1854  Last data filed at 1/17/2020 1701  Gross per 24 hour   Intake 1537 ml   Output --   Net 1537 ml            Exam:     Physical Exam:    Gen:  Well-developed, well-nourished, in no acute distress  HEENT:  Pink conjunctivae, hearing intact to voice, moist mucous membranes  Resp:  No accessory muscle use, clear breath sounds without wheezes rales or rhonchi  Card:  No murmurs, normal S1, S2 without thrills, trace peripheral edema  Abd:  Soft, non-tender, non-distended, normoactive bowel sounds are present  Musc:  No cyanosis  Skin:  No ulcers, skin turgor is good, discoid nonraised erythematous lesions of both arms  Neuro:  Cranial nerves 3-12 are grossly intact,  strength is 5/5 bilaterally, dorsi / plantarflexion strength is 5/5 bilaterally, follows commands appropriately  Psych:  Alert with good insight. Oriented to person, place, and time           Labs:    Recent Labs     01/17/20  1340   WBC 16.9*   HGB 12.3   HCT 37.6        Recent Labs     01/17/20  1340      K 3.8      CO2 29   GLU 96   BUN 10   CREA 1.06*   CA 9.1   ALB 3.7   TBILI 0.4   SGOT 33   ALT 37     Lab Results   Component Value Date/Time    Glucose (POC) 94 03/25/2019 10:32 PM    Glucose (POC) 146 (H) 03/25/2019 04:15 PM     No results for input(s): PH, PCO2, PO2, HCO3, FIO2 in the last 72 hours. No results for input(s): INR, INREXT, INREXT in the last 72 hours. Chest Xray:  Left greater than right basilar opacities are unchanged compared to prior CT and radiograph and may be attributable to the history of lipoid pneumonia. No new  lung opacity. EKG reviewed:   Sinus tachycardia, inferior q waves, QT prolongation. Medical records reviewed in preparation for this admission: Old medical records.     Surrogate decision maker:  spouse    Total time spent in care of this patient: 300 Lexx Dawn discussed with: Patient and Family    Discussed:  Care Plan    Prophylaxis:  Lovenox    Probable Disposition:  Home w/Family           ___________________________________________________    Attending Physician: Nura Zarco MD

## 2020-01-17 NOTE — PROGRESS NOTES
1/17/2020  6:55 PM  Case management note    Reason for Admission:   Fever, pneumonia  Patient is normally independent and drives. She lives with spouse in single story home with 5 steps to enter. Patient sons are at bedside. Patient has history of DM, HLD, HTN, asthma, JENNIFER. [facility] [address]  Patient came to hospital for vomiting and increased weakness. Patient had already been diagnosed with pneumonia                   RRAT Score:          7           Plan for utilizing home health: To be evaluated by pt/ot                     Current Advanced Directive/Advance Care Plan: does not have                         Transition of Care Plan:        1. Home with family assistance  2. PCP follow up  3. AD planning  4.  PT/OT   5. CM to follow until discharge    Care Management Interventions  PCP Verified by CM: Yes(dr. claude silva )  Mode of Transport at Discharge: Self  Transition of Care Consult (CM Consult): Discharge Planning  Current Support Network: Lives with Spouse  Confirm Follow Up Transport: Family  The Plan for Transition of Care is Related to the Following Treatment Goals : pneumonia  Discharge Location  Discharge Placement: Home with family assistance  [place]

## 2020-01-17 NOTE — CONSULTS
[facility]  Pulmonary, Critical Care, and Sleep Medicine    Initial Patient Consult    Name: Viviane Armendariz MRN: [id number]   : 1965 Hospital: [address]    Date: 2020        IMPRESSION:   · Fever, suspect viral illness  · JENNIFER  · H/o lipoid pna  · HTN  · GERD  · hyperglycemia      RECOMMENDATIONS:   · Supplemental O2 as needed to keep sats > 88%  · cpap whenever sleeping  · Will add empiric abx for now - vanc, emily, levofloxacin  · Follow cultures  · Check RVP    From my standpoint, if RVP + and no other (non-pulmonary) source of infection, could d/c abx and discharge home. If RVP negative and no other source of infection found, would do chest CT. Thank you for the consult  Will see as needed over the weekend. Subjective:     Asked to see Ms. Kristen Hwang by Dr. Marvin Kraft for evaluation of \"history of lipoid pna\"    Ms. Kristen Hwang is a 47yo female w/ history of JENNIFER, lipoid pneumonia, thyroid mass, GERD, HTN and DM who presented to the ER on  w/ complaints of fever (Tm 103 at home), myalgias, weakness and dry cough. Has had n/v and has been unable to keep anything down for the past 3 days. Was seen at [facility] on 1/15 and given IVF and IM ctx. Started on levofloxacin on . She is followed in our office by Dr. Glendy Howell for abnormal chest imaging and JENNIFER. Abnormal chest imaging dates back to  at least.  She has ggo and nodular opacities in RML and LLL. More consolidative changes in KITA/lingula. No improvement w/ abx or steroids. Has been weaned off of steroids completely since May 2019. She underwent navigational bronchoscopy in 2018. Path showed only mildly reactive and inflamed bronchial mucosa. Cultures did grow alpha strep and burkholderia cepacia (pansensitive) - treated w/ levofloxacin. Then underwent VATS in 3/2019. Cultures grew staph auricularis. Path c/w lipoid pneumonia.  At that time, she had been using peppermint oil several times a day; however, use of this post-dates the first abnormal CT by several years. She no longer uses any form of oil or petroleum based products, nor does she vape. Prior PFTs were normal.  Uses auto-cpap for JENNIFER. Never smoker. 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 Past Medical History:   Diagnosis Date    Arrhythmia     tachycardia  (Zara Haile)    Asthma 2013    inhaler used    Diabetes ([facility] [address]     pre diabetic per patient     Dyslipidemia     GERD (gastroesophageal reflux disease)     Hypertension     Obesity 2014    JENNIFER on CPAP     Pneumonia 10/2018    RA (rheumatoid arthritis) ([facility] [address]     KNEES    Sepsis ([facility] [address] 10/2018      Past Surgical History:   Procedure Laterality Date    ENDOSCOPY, COLON, DIAGNOSTIC      HX  SECTION      x2    HX CHOLECYSTECTOMY      HX HYSTERECTOMY      complete, endometriosis.  IR BRONCHOSCOPY  2018      Prior to Admission medications    Medication Sig Start Date End Date Taking? Authorizing Provider   albuterol-ipratropium (DUO-NEB) 2.5 mg-0.5 mg/3 ml nebu 3 mL by Nebulization route every six (6) hours as needed for Wheezing. Yes Provider, Historical   cloNIDine HCL (CATAPRES) 0.1 mg tablet Take 0.1 mg by mouth nightly. Yes Provider, Historical   ergocalciferol (ERGOCALCIFEROL) 1,250 mcg (50,000 unit) capsule Take 50,000 Units by mouth every Monday. Yes Provider, Historical   dulaglutide (TRULICITY) 1.5 IC/1.6 mL sub-q pen 1.5 mg by SubCUTAneous route every Tuesday. Yes Provider, Historical   olopatadine (PATADAY) 0.2 % drop ophthalmic solution Administer 1 Drop to both eyes daily. Yes Provider, Historical   famotidine (PEPCID) 20 mg tablet Take 20 mg by mouth daily. Yes Provider, Historical   torsemide (DEMADEX) 20 mg tablet Take 20 mg by mouth two (2) times a day. Yes Provider, Historical   levoFLOXacin (LEVAQUIN) 750 mg tablet Take 750 mg by mouth daily.    Yes Provider, Historical ondansetron (ZOFRAN ODT) 4 mg disintegrating tablet Take 1 Tab by mouth every eight (8) hours as needed for Nausea or Vomiting. 1/17/20  Yes Sarah Beth Castro MD   aspirin 81 mg chewable tablet Take 81 mg by mouth daily. Yes Provider, Historical   DILT- mg XR capsule TAKE 1 CAPSULE BY MOUTH EVERY DAY ON EMPTY STOMACH IN THE MORNING 12/19/18  Yes Provider, Historical   enalapril (VASOTEC) 5 mg tablet Take 5 mg by mouth daily. 12/15/18  Yes Provider, Historical   metFORMIN ER (GLUCOPHAGE XR) 500 mg tablet TAKE 2 TABLETS BY MOUTH TWICE A DAY 12/19/18  Yes Provider, Historical   therapeutic multivitamin (THERAGRAN) tablet Take 1 Tab by mouth daily. Yes Provider, Historical   albuterol (PROVENTIL HFA, VENTOLIN HFA) 90 mcg/actuation inhaler Take 2 Puffs by inhalation every six (6) hours as needed for Wheezing.  3/1/14  Yes Piotr Wisdom MD     No Known Allergies   Social History     Tobacco Use    Smoking status: Never Smoker    Smokeless tobacco: Never Used   Substance Use Topics    Alcohol use: No      Family History   Problem Relation Age of Onset    Other Mother         CAD   [de-identified] Hypertension Mother     Hypertension Father     Kidney Disease Father         End Stage Renal, Dialysis    Hypertension Brother     Diabetes Paternal Uncle     Other Maternal Grandmother         CAD    Heart Attack Maternal Grandmother     Cancer Maternal Grandfather         Throat Cancer    Dementia Paternal Grandmother         Alzheimer's Dementia    Cancer Paternal Grandfather         Colon Cancer    Breast Cancer Maternal Aunt 62        mothers 1/2 sister    Diabetes Paternal Uncle     Anesth Problems Neg Hx         Current Facility-Administered Medications   Medication Dose Route Frequency    meropenem (MERREM) 1 g in 0.9% sodium chloride (MBP/ADV) 50 mL  1 g IntraVENous Q12H    levoFLOXacin (LEVAQUIN) 750 mg in D5W IVPB  750 mg IntraVENous Q24H       Review of Systems:  A comprehensive review of systems was negative except for that written in the HPI. Objective:   Vital Signs:    Visit Vitals  /82 (BP 1 Location: Right arm, BP Patient Position: At rest)   Pulse (!) 111   Temp 99.6 °F (37.6 °C)   Resp 16   Ht 5' (1.524 m)   Wt 117.9 kg (260 lb)   SpO2 99%   BMI 50.78 kg/m²       O2 Device: Room air       Temp (24hrs), Av.9 °F (37.7 °C), Min:99.4 °F (37.4 °C), Max:100.6 °F (38.1 °C)       Intake/Output:   Last shift:       0701 -  1900  In: 2401 [I.V.:1537]  Out: -   Last 3 shifts: No intake/output data recorded. Intake/Output Summary (Last 24 hours) at 2020 1733  Last data filed at 2020 1701  Gross per 24 hour   Intake 1537 ml   Output --   Net 1537 ml      Physical Exam:   General:  Alert, cooperative, no distress, appears stated age. Head:  Normocephalic, without obvious abnormality, atraumatic. Eyes:  Conjunctivae/corneas clear. PERRL, EOMs intact. Nose: Nares normal. Septum midline. Mucosa normal. No drainage or sinus tenderness. Throat: Lips, mucosa, and tongue normal. Teeth and gums normal.   Neck: Supple, symmetrical, trachea midline, no adenopathy, thyroid: no enlargment/tenderness/nodules, no carotid bruit and no JVD. Back:   Symmetric, no curvature. ROM normal.   Lungs:   Clear to auscultation bilaterally. Chest wall:  No tenderness or deformity. Heart:  Regular rate and rhythm, S1, S2 normal, no murmur, click, rub or gallop. Abdomen:   Soft, non-tender. Bowel sounds normal. No masses,  No organomegaly. Extremities: LE edema   Pulses: 2+ and symmetric all extremities.    Skin: Skin color, texture, turgor normal. No rashes or lesions   Lymph nodes: Cervical, supraclavicular, and axillary nodes normal.   Neurologic: Grossly nonfocal     Data review:     Recent Results (from the past 24 hour(s))   EKG, 12 LEAD, INITIAL    Collection Time: 20  1:36 PM   Result Value Ref Range    Ventricular Rate 137 BPM    Atrial Rate 137 BPM    QRS Duration 68 ms Q-T Interval 382 ms    QTC Calculation (Bezet) 576 ms    Calculated R Axis -9 degrees    Calculated T Axis 17 degrees    Diagnosis       Sinus tachycardia  Possible Inferior infarct (cited on or before 22-MAR-2019)  Abnormal ECG  When compared with ECG of 22-MAR-2019 07:03,  No significant change was found  Confirmed by Neena Garcia MD. ([id number]) on 1/17/2020 0:44:33 PM     METABOLIC PANEL, COMPREHENSIVE    Collection Time: 01/17/20  1:40 PM   Result Value Ref Range    Sodium 137 136 - 145 mmol/L    Potassium 3.8 3.5 - 5.1 mmol/L    Chloride 101 97 - 108 mmol/L    CO2 29 21 - 32 mmol/L    Anion gap 7 5 - 15 mmol/L    Glucose 96 65 - 100 mg/dL    BUN 10 6 - 20 MG/DL    Creatinine 1.06 (H) 0.55 - 1.02 MG/DL    BUN/Creatinine ratio 9 (L) 12 - 20      GFR est AA >60 >60 ml/min/1.73m2    GFR est non-AA 54 (L) >60 ml/min/1.73m2    Calcium 9.1 8.5 - 10.1 MG/DL    Bilirubin, total 0.4 0.2 - 1.0 MG/DL    ALT (SGPT) 37 12 - 78 U/L    AST (SGOT) 33 15 - 37 U/L    Alk. phosphatase 145 (H) 45 - 117 U/L    Protein, total 8.4 (H) 6.4 - 8.2 g/dL    Albumin 3.7 3.5 - 5.0 g/dL    Globulin 4.7 (H) 2.0 - 4.0 g/dL    A-G Ratio 0.8 (L) 1.1 - 2.2     CBC WITH AUTOMATED DIFF    Collection Time: 01/17/20  1:40 PM   Result Value Ref Range    WBC 16.9 (H) 3.6 - 11.0 K/uL    RBC 4.16 3.80 - 5.20 M/uL    HGB 12.3 11.5 - 16.0 g/dL    HCT 37.6 35.0 - 47.0 %    MCV 90.4 80.0 - 99.0 FL    MCH 29.6 26.0 - 34.0 PG    MCHC 32.7 30.0 - 36.5 g/dL    RDW 12.9 11.5 - 14.5 %    PLATELET 258 121 - 233 K/uL    MPV 9.9 8.9 - 12.9 FL    NRBC 0.0 0  WBC    ABSOLUTE NRBC 0.00 0.00 - 0.01 K/uL    NEUTROPHILS 75 32 - 75 %    LYMPHOCYTES 18 12 - 49 %    MONOCYTES 7 5 - 13 %    EOSINOPHILS 0 0 - 7 %    BASOPHILS 0 0 - 1 %    IMMATURE GRANULOCYTES 0 0.0 - 0.5 %    ABS. NEUTROPHILS 12.5 (H) 1.8 - 8.0 K/UL    ABS. LYMPHOCYTES 3.0 0.8 - 3.5 K/UL    ABS. MONOCYTES 1.3 (H) 0.0 - 1.0 K/UL    ABS. EOSINOPHILS 0.1 0.0 - 0.4 K/UL    ABS.  BASOPHILS 0.1 0.0 - 0.1 K/UL ABS. IMM. GRANS. 0.1 (H) 0.00 - 0.04 K/UL    DF AUTOMATED     SAMPLES BEING HELD    Collection Time: 01/17/20  1:40 PM   Result Value Ref Range    SAMPLES BEING HELD 1BL,1SST,1RED     COMMENT        Add-on orders for these samples will be processed based on acceptable specimen integrity and analyte stability, which may vary by analyte. POC LACTIC ACID    Collection Time: 01/17/20  1:42 PM   Result Value Ref Range    Lactic Acid (POC) 1.15 0.40 - 2.00 mmol/L   INFLUENZA A & B AG (RAPID TEST)    Collection Time: 01/17/20  2:32 PM   Result Value Ref Range    Influenza A Antigen NEGATIVE  NEG      Influenza B Antigen NEGATIVE  NEG     URINALYSIS W/ REFLEX CULTURE    Collection Time: 01/17/20  4:10 PM   Result Value Ref Range    Color YELLOW/STRAW      Appearance CLEAR CLEAR      Specific gravity 1.016 1.003 - 1.030      pH (UA) 8.0 5.0 - 8.0      Protein NEGATIVE  NEG mg/dL    Glucose NEGATIVE  NEG mg/dL    Ketone NEGATIVE  NEG mg/dL    Bilirubin NEGATIVE  NEG      Blood NEGATIVE  NEG      Urobilinogen 0.2 0.2 - 1.0 EU/dL    Nitrites NEGATIVE  NEG      Leukocyte Esterase NEGATIVE  NEG      WBC 0-4 0 - 4 /hpf    RBC 0-5 0 - 5 /hpf    Epithelial cells MODERATE (A) FEW /lpf    Bacteria 1+ (A) NEG /hpf    UA:UC IF INDICATED URINE CULTURE ORDERED (A) CNI     URINE CULTURE HOLD SAMPLE    Collection Time: 01/17/20  4:10 PM   Result Value Ref Range    Urine culture hold        URINE ON HOLD IN MICROBIOLOGY DEPT FOR 3 DAYS. IF UNPRESERVED URINE IS SUBMITTED, IT CANNOT BE USED FOR ADDITIONAL TESTING AFTER 24 HRS, RECOLLECTION WILL BE REQUIRED.        Imaging:  I have personally reviewed the patients radiographs and have reviewed the reports:          Jseenia Campo MD

## 2020-01-17 NOTE — ED PROVIDER NOTES
HPI     47 yo F with PMHx of DM, HLD, GERD, HTN, JENNIFER, RA, asthma who presents to the ER for LLL pneumonia. Patient was seen at [facility] on 01/15 due to muscle pain and weakness. She was diagnosed then with pneumonia. She received Rocephin and was sent home with Levaquin 750 mg. Today she reports fatigue, fever, nausea/vomiting, lightheadedness and has not be able to eat in the last 3 days. She has a dry cough. No CP, SOB, palpitations  She also reports having some red spots in her arms that showed up today. She denies being allergic to anything. Has not taken anything new at home. Of note, patient had CT scan of chest on 2019 that showed lipoid pneumonia. She also has a previous history of ILD. She has had lung biopsies and has been admitted in the past for multilobular pneumonia     Past Medical History:   Diagnosis Date    Arrhythmia     tachycardia  (Zara Solorio)    Asthma 2013    inhaler used    Diabetes ([facility] [address]     pre diabetic per patient     Dyslipidemia     GERD (gastroesophageal reflux disease)     Hypertension     Obesity 2014    JENNIFER on CPAP     Pneumonia 10/2018    RA (rheumatoid arthritis) ([facility] [address]     KNEES    Sepsis ([facility] [address] 10/2018       Past Surgical History:   Procedure Laterality Date    ENDOSCOPY, COLON, DIAGNOSTIC      HX  SECTION      x2    HX CHOLECYSTECTOMY      HX HYSTERECTOMY      complete, endometriosis.      IR BRONCHOSCOPY  2018         Family History:   Problem Relation Age of Onset    Other Mother         CAD    Hypertension Mother     Hypertension Father     Kidney Disease Father         End Stage Renal, Dialysis    Hypertension Brother     Diabetes Paternal Uncle     Other Maternal Grandmother         CAD    Heart Attack Maternal Grandmother     Cancer Maternal Grandfather         Throat Cancer    Dementia Paternal Grandmother         Alzheimer's Dementia    Cancer Paternal Grandfather         Colon Cancer    Breast Cancer Maternal Aunt 62        mothers 1/2 sister    Diabetes Paternal Uncle     Anesth Problems Neg Hx        Social History     Socioeconomic History    Marital status:      Spouse name: Not on file    Number of children: Not on file    Years of education: Not on file    Highest education level: Not on file   Occupational History    Not on file   Social Needs    Financial resource strain: Not on file    Food insecurity:     Worry: Not on file     Inability: Not on file    Transportation needs:     Medical: Not on file     Non-medical: Not on file   Tobacco Use    Smoking status: Never Smoker    Smokeless tobacco: Never Used   Substance and Sexual Activity    Alcohol use: No    Drug use: No    Sexual activity: Yes     Partners: Male     Birth control/protection: Surgical   Lifestyle    Physical activity:     Days per week: Not on file     Minutes per session: Not on file    Stress: Not on file   Relationships    Social connections:     Talks on phone: Not on file     Gets together: Not on file     Attends Confucianism service: Not on file     Active member of club or organization: Not on file     Attends meetings of clubs or organizations: Not on file     Relationship status: Not on file    Intimate partner violence:     Fear of current or ex partner: Not on file     Emotionally abused: Not on file     Physically abused: Not on file     Forced sexual activity: Not on file   Other Topics Concern    Not on file   Social History Narrative    Not on file         ALLERGIES: Patient has no known allergies. Review of Systems   Constitutional: Positive for appetite change, fatigue and fever. HENT: Negative. Eyes: Negative. Respiratory: Positive for cough. Negative for shortness of breath. Cardiovascular: Negative. Gastrointestinal: Positive for nausea and vomiting. Negative for abdominal pain, constipation and diarrhea. Genitourinary: Negative. Musculoskeletal: Negative. Skin: Negative. Neurological: Positive for light-headedness. Negative for dizziness. Psychiatric/Behavioral: Negative. Vitals:    01/17/20 1325   BP: (!) 170/113   Pulse: (!) 128   Resp: 23   Temp: (!) 100.6 °F (38.1 °C)   SpO2: 99%   Weight: 117.9 kg (260 lb)   Height: 5' (1.524 m)            Physical Exam  Vitals signs reviewed. Constitutional:       Appearance: She is obese. She is ill-appearing. HENT:      Head: Normocephalic and atraumatic. Nose: Nose normal.      Mouth/Throat:      Mouth: Mucous membranes are dry. Pharynx: Oropharynx is clear. Eyes:      Conjunctiva/sclera: Conjunctivae normal.      Pupils: Pupils are equal, round, and reactive to light. Neck:      Musculoskeletal: Normal range of motion and neck supple. Cardiovascular:      Rate and Rhythm: Regular rhythm. Tachycardia present. Heart sounds: No murmur. Pulmonary:      Effort: Pulmonary effort is normal.      Breath sounds: Decreased breath sounds present. No wheezing, rhonchi or rales. Abdominal:      General: Bowel sounds are normal. There is no distension. Palpations: Abdomen is soft. Tenderness: There is no tenderness. There is no guarding or rebound. Musculoskeletal:      Right lower leg: No edema. Left lower leg: No edema. Lymphadenopathy:      Cervical: No cervical adenopathy. Skin:     General: Skin is warm and dry. Findings: Rash present. Neurological:      General: No focal deficit present. Mental Status: She is alert and oriented to person, place, and time.    Psychiatric:         Mood and Affect: Mood normal.         Behavior: Behavior normal.          MDM  Number of Diagnoses or Management Options  Fatigue, unspecified type:   Nausea and vomiting, intractability of vomiting not specified, unspecified vomiting type:      Amount and/or Complexity of Data Reviewed  Clinical lab tests: ordered and reviewed  Tests in the radiology section of CPT®: ordered and reviewed  Tests in the medicine section of CPT®: ordered and reviewed  Review and summarize past medical records: yes  Discuss the patient with other providers: yes    Risk of Complications, Morbidity, and/or Mortality  Presenting problems: moderate  Diagnostic procedures: moderate  Management options: moderate    Patient Progress  Patient progress: stable         Procedures      47 yo F with PMHx of DM, HLD, GERD, HTN, JENNIFER, RA, asthma who presents to the ER for LLL pneumonia. CXR showed Left greater than right basilar opacities are unchanged compared to prior CT and radiograph and may be attributable to the history of lipoid pneumonia. No new lung opacity. Will check for other causes of infection besides lung and consult pulmonologist to guide us in treatment of lipoid pneumonia     Discussed with Dr. Ritesh Johnson (pulmonary). Lipoid pneumonia has been present in the past. Look for other causes of infection. UA with 1 + bacteria. Will send for culture. Dr. Ritesh Johnson (pulmonology) recommends admission for IV antibiotics. Check RVP. If RVP negative, she will repeat chest CT    I reviewed with the resident the medical history and the resident's findings on the physical examination. I discussed with the resident the patient's diagnosis and concur with the plan. Pt non toxic but fatigued appearing. Low suspicion for PNA, as no cough and no new infiltrate/consolidation on CXR. Will hold off on abx at this time. Pt on day 3 of levaquin per Urgent Care. Per pulmonology recommendation, will admit for further management. Patient is being admitted to the hospital.  The results of their tests and reasons for their admission have been discussed with them and/or available family. They convey agreement and understanding for the need to be admitted and for their admission diagnosis.

## 2020-01-17 NOTE — PROGRESS NOTES
[facility] Dosing Services: Antimicrobial Stewardship Progress Note    Consult for antibiotic dosing of vancomycin by Dr. Sue Sutherland  Pharmacist reviewed antibiotic appropriateness for 48year old , female  for indication of HAP  Day of Therapy 1    Plan:  Vancomycin therapy:  Start Vancomycin therapy, with loading dose of 2500 mg IV x 1 in ED now  Follow with maintenance dose of 1750 mg IV Q8H  Dose calculated to approximate a therapeutic trough of 15-20 mcg/mL. Last trough level / Plan for level: prior to 4th dose (not yet ordered)  Pharmacy to follow daily and will make changes to dose and/or frequency based on clinical status. Date Dose & Interval Measured (mcg/mL) Extrapolated (mcg/mL)   ? ? ? ?   ? ? ? ?   ? ? ? ? Other Antimicrobial  (not dosed by pharmacist)   Meropenem 500 mg IV Q6H   mg IV Q24H   Cultures     1/17 Blood x 2 - (pending)  1/17 Urine - (pending)  1/17 Resp - (pending)   Serum Creatinine     Lab Results   Component Value Date/Time    Creatinine 1.06 (H) 01/17/2020 01:40 PM    Creatinine (POC) 1.0 05/28/2015 02:35 PM       Creatinine Clearance Estimated Creatinine Clearance: 73 mL/min (A) (based on SCr of 1.06 mg/dL (H)). Procalcitonin    Lab Results   Component Value Date/Time    Procalcitonin <0.02 10/12/2018 07:11 PM        Temp   99.6 °F (37.6 °C)    WBC   Lab Results   Component Value Date/Time    WBC 16.9 (H) 01/17/2020 01:40 PM       H/H   Lab Results   Component Value Date/Time    HGB 12.3 01/17/2020 01:40 PM      Platelets Lab Results   Component Value Date/Time    PLATELET 546 79/70/1169 01:40 PM          Thank you for the consult,  Thomas May

## 2020-01-17 NOTE — ED TRIAGE NOTES
Pt here for Left lower lung pneumonia, diagnosed 1/15 at [facility], weakness, and vomiting. Also reports red splotches on inner forearms, noticed today by . States that she has been unable to keep anything down today, food or fluids.  Has had fever and is treating with alternating tylenol and motrin

## 2020-01-17 NOTE — ED NOTES
1:24 PM  I have just evaluated the patient. I have reviewed Her vital signs and determined there is currently no worsening in their condition or physical exam. I have talked with the patient and the family and advised them that I am the provider in triage and have ordered lab work, x rays and other diagnostic tests. I have advised them that we will try and get them to the back as soon as possible. I have also advised them that should they have a worsening condition or any problems before they are sent back to the main ED, to contact me or the triage nurse. Initial Complaint: LLL PNA    Started: > 1 week. Endorses: Seen at [facility] on 1/15. Dx with LLL PNA and started on Levofloxacin. Given IM Rocephin in the office. Also taking tylenol/ibuprofen. Fevers as high as 103. Last ibuprofen was 2 hours ago. Red splotches on the arms she noticed this morning. Not itchy. Chills, nausea, vomiting. Unable to keep flood or fluids down today. Had some diarrhea yesterday. Extreme weakness. Hospitalized last year for multilobar PNA and sepsis last year. Denies: CP, SOB    Made better: nothing  Made worse: nothing    No further complaints. Orders placed.      Primary care provider: MD Mayte Aguillon, LELE

## 2020-01-17 NOTE — PROGRESS NOTES
[facility]: MED RECONCILIATION    Comments/Recommendations:   Patient alert and oriented for medication reconciliation and knowledgeable regarding medications. Patient took levaquin, diltiazem, enalapril, metformin and torsemide this morning only. Patient prescribed levaquin 750mg daily X 10 days on 1/15/20, has taken 2/10 doses. Confirmed NKDA and preferred pharmacy as [facility] [address]. Medications added: Torsemide  Levaquin  Trulicity   famotidine    Medications removed:    Amlodipine  Bumetanide  Hydralazine  Melatonin  Prednisone  Ranitidine  Repaglinide  Pericolace  Bactrim    Medications adjusted:    Olopatadine 0.2% both eyes daily adjusted from 0.7% daily   Enalapril 5mg daily adjusted from 20mg daily   Clonidine 0.1mg QHS adjusted from 0.1mg BID  Duo-neb q6h PRN adjusted from q6h     Information obtained from: patient, Rx query    Allergies: Patient has no known allergies. Prior to Admission Medications:     Medication Documentation Review Audit       Reviewed by Eran Valdez (Pharmacist) on 01/17/20 at 1455      Medication Sig Documenting Provider Last Dose Status Taking? albuterol (PROVENTIL HFA, VENTOLIN HFA) 90 mcg/actuation inhaler Take 2 Puffs by inhalation every six (6) hours as needed for Wheezing. Gisele Calloway MD  Active Yes           Med Note (STEPHANIE GENTILE Jan 17, 2020  2:48 PM)     albuterol-ipratropium (DUO-NEB) 2.5 mg-0.5 mg/3 ml nebu 3 mL by Nebulization route every six (6) hours as needed for Wheezing. Provider, Historical  Active Yes   aspirin 81 mg chewable tablet Take 81 mg by mouth daily. Provider, Historical 1/16/2020 AM Active Yes           Med Note (STEPHANIE GENTILE Jan 17, 2020  2:48 PM)     cloNIDine HCL (CATAPRES) 0.1 mg tablet Take 0.1 mg by mouth nightly.  Provider, Historical 1/16/2020 PM Active Yes   DILT- mg XR capsule TAKE 1 CAPSULE BY MOUTH EVERY DAY ON EMPTY STOMACH IN THE MORNING Provider, Historical 1/17/2020 AM Active Yes           Med Note (LISSETTE GENTILE Jan 17, 2020  2:49 PM)     dulaglutide (TRULICITY) 1.5 TE/9.6 mL sub-q pen 1.5 mg by SubCUTAneous route every Tuesday. Provider, Historical 1/14/2020 Active Yes   enalapril (VASOTEC) 5 mg tablet Take 5 mg by mouth daily. Provider, Historical 1/17/2020 AM Active Yes   ergocalciferol (ERGOCALCIFEROL) 1,250 mcg (50,000 unit) capsule Take 50,000 Units by mouth every Monday. Provider, Historical 1/13/2020 Active Yes   famotidine (PEPCID) 20 mg tablet Take 20 mg by mouth daily. Provider, Historical 1/16/2020 AM Active Yes   levoFLOXacin (LEVAQUIN) 750 mg tablet Take 750 mg by mouth daily. Provider, Historical 1/17/2020 AM Active Yes           Med Note (LISSETTE GENTILE Jan 17, 2020  2:55 PM) Started on 1/16/20, has taken 2/10 days. metFORMIN ER (GLUCOPHAGE XR) 500 mg tablet TAKE 2 TABLETS BY MOUTH TWICE A DAY Provider, Historical 1/17/2020 AM Active Yes           Med Note (LISSETTE GENTILE Jan 17, 2020  2:50 PM)     olopatadine (PATADAY) 0.2 % drop ophthalmic solution Administer 1 Drop to both eyes daily. Provider, Historical 1/16/2020 AM Active Yes   therapeutic multivitamin (THERAGRAN) tablet Take 1 Tab by mouth daily. Provider, Historical 1/16/2020 AM Active Yes           Med Note (LISSETTE GENTILE Jan 17, 2020  2:50 PM)     torsemide (DEMADEX) 20 mg tablet Take 20 mg by mouth two (2) times a day.  Provider, Historical 1/17/2020 AM Active Yes                      Thank You,   Lissette Gentile, PharmD, BCPS   Contact: 2019

## 2020-01-18 ENCOUNTER — APPOINTMENT (OUTPATIENT)
Dept: CT IMAGING | Age: 55
End: 2020-01-18
Attending: INTERNAL MEDICINE
Payer: COMMERCIAL

## 2020-01-18 VITALS
TEMPERATURE: 98.1 F | HEART RATE: 101 BPM | SYSTOLIC BLOOD PRESSURE: 133 MMHG | BODY MASS INDEX: 54.23 KG/M2 | HEIGHT: 60 IN | WEIGHT: 276.24 LBS | DIASTOLIC BLOOD PRESSURE: 88 MMHG | OXYGEN SATURATION: 98 % | RESPIRATION RATE: 18 BRPM

## 2020-01-18 PROBLEM — R00.0 SINUS TACHYCARDIA: Status: ACTIVE | Noted: 2020-01-18

## 2020-01-18 PROBLEM — R65.10 SYSTEMIC INFLAMMATORY RESPONSE SYNDROME (SIRS) (HCC): Status: ACTIVE | Noted: 2018-10-14

## 2020-01-18 LAB
ANION GAP SERPL CALC-SCNC: 5 MMOL/L (ref 5–15)
BASOPHILS # BLD: 0 K/UL (ref 0–0.1)
BASOPHILS NFR BLD: 0 % (ref 0–1)
BUN SERPL-MCNC: 10 MG/DL (ref 6–20)
BUN/CREAT SERPL: 13 (ref 12–20)
CALCIUM SERPL-MCNC: 8.3 MG/DL (ref 8.5–10.1)
CHLORIDE SERPL-SCNC: 109 MMOL/L (ref 97–108)
CO2 SERPL-SCNC: 25 MMOL/L (ref 21–32)
CREAT SERPL-MCNC: 0.76 MG/DL (ref 0.55–1.02)
DIFFERENTIAL METHOD BLD: ABNORMAL
EOSINOPHIL # BLD: 0.1 K/UL (ref 0–0.4)
EOSINOPHIL NFR BLD: 1 % (ref 0–7)
ERYTHROCYTE [DISTWIDTH] IN BLOOD BY AUTOMATED COUNT: 13.2 % (ref 11.5–14.5)
GLUCOSE BLD STRIP.AUTO-MCNC: 106 MG/DL (ref 65–100)
GLUCOSE BLD STRIP.AUTO-MCNC: 81 MG/DL (ref 65–100)
GLUCOSE SERPL-MCNC: 101 MG/DL (ref 65–100)
HCT VFR BLD AUTO: 31.3 % (ref 35–47)
HGB BLD-MCNC: 10 G/DL (ref 11.5–16)
IMM GRANULOCYTES # BLD AUTO: 0.1 K/UL (ref 0–0.04)
IMM GRANULOCYTES NFR BLD AUTO: 1 % (ref 0–0.5)
LYMPHOCYTES # BLD: 2.5 K/UL (ref 0.8–3.5)
LYMPHOCYTES NFR BLD: 30 % (ref 12–49)
MAGNESIUM SERPL-MCNC: 2 MG/DL (ref 1.6–2.4)
MCH RBC QN AUTO: 29.2 PG (ref 26–34)
MCHC RBC AUTO-ENTMCNC: 31.9 G/DL (ref 30–36.5)
MCV RBC AUTO: 91.5 FL (ref 80–99)
MONOCYTES # BLD: 0.4 K/UL (ref 0–1)
MONOCYTES NFR BLD: 5 % (ref 5–13)
NEUTS SEG # BLD: 5.3 K/UL (ref 1.8–8)
NEUTS SEG NFR BLD: 63 % (ref 32–75)
NRBC # BLD: 0 K/UL (ref 0–0.01)
NRBC BLD-RTO: 0 PER 100 WBC
PLATELET # BLD AUTO: 317 K/UL (ref 150–400)
PMV BLD AUTO: 9.6 FL (ref 8.9–12.9)
POTASSIUM SERPL-SCNC: 3.5 MMOL/L (ref 3.5–5.1)
PROCALCITONIN SERPL-MCNC: 0.05 NG/ML
RBC # BLD AUTO: 3.42 M/UL (ref 3.8–5.2)
SERVICE CMNT-IMP: ABNORMAL
SERVICE CMNT-IMP: NORMAL
SODIUM SERPL-SCNC: 139 MMOL/L (ref 136–145)
WBC # BLD AUTO: 8.3 K/UL (ref 3.6–11)

## 2020-01-18 PROCEDURE — 71260 CT THORAX DX C+: CPT

## 2020-01-18 PROCEDURE — 83735 ASSAY OF MAGNESIUM: CPT

## 2020-01-18 PROCEDURE — 99218 HC RM OBSERVATION: CPT

## 2020-01-18 PROCEDURE — 82962 GLUCOSE BLOOD TEST: CPT

## 2020-01-18 PROCEDURE — 74011250637 HC RX REV CODE- 250/637: Performed by: INTERNAL MEDICINE

## 2020-01-18 PROCEDURE — 84145 PROCALCITONIN (PCT): CPT

## 2020-01-18 PROCEDURE — 80048 BASIC METABOLIC PNL TOTAL CA: CPT

## 2020-01-18 PROCEDURE — 74011000258 HC RX REV CODE- 258: Performed by: INTERNAL MEDICINE

## 2020-01-18 PROCEDURE — 36415 COLL VENOUS BLD VENIPUNCTURE: CPT

## 2020-01-18 PROCEDURE — 74011636320 HC RX REV CODE- 636/320: Performed by: RADIOLOGY

## 2020-01-18 PROCEDURE — 74011250636 HC RX REV CODE- 250/636: Performed by: INTERNAL MEDICINE

## 2020-01-18 PROCEDURE — 96372 THER/PROPH/DIAG INJ SC/IM: CPT

## 2020-01-18 PROCEDURE — 85025 COMPLETE CBC W/AUTO DIFF WBC: CPT

## 2020-01-18 RX ORDER — LANOLIN ALCOHOL/MO/W.PET/CERES
5 CREAM (GRAM) TOPICAL ONCE
Status: COMPLETED | OUTPATIENT
Start: 2020-01-18 | End: 2020-01-18

## 2020-01-18 RX ORDER — METFORMIN HYDROCHLORIDE 500 MG/1
TABLET, EXTENDED RELEASE ORAL
Qty: 30 TAB | Refills: 2 | OUTPATIENT
Start: 2020-01-20 | End: 2021-02-25

## 2020-01-18 RX ADMIN — MEROPENEM 500 MG: 500 INJECTION, POWDER, FOR SOLUTION INTRAVENOUS at 00:23

## 2020-01-18 RX ADMIN — MELATONIN TAB 3 MG 4.5 MG: 3 TAB at 01:39

## 2020-01-18 RX ADMIN — MEROPENEM 500 MG: 500 INJECTION, POWDER, FOR SOLUTION INTRAVENOUS at 06:29

## 2020-01-18 RX ADMIN — MEROPENEM 500 MG: 500 INJECTION, POWDER, FOR SOLUTION INTRAVENOUS at 11:21

## 2020-01-18 RX ADMIN — FAMOTIDINE 20 MG: 20 TABLET, FILM COATED ORAL at 09:14

## 2020-01-18 RX ADMIN — THERA TABS 1 TABLET: TAB at 09:14

## 2020-01-18 RX ADMIN — DILTIAZEM HYDROCHLORIDE 240 MG: 240 CAPSULE, COATED, EXTENDED RELEASE ORAL at 09:14

## 2020-01-18 RX ADMIN — IOPAMIDOL 100 ML: 612 INJECTION, SOLUTION INTRAVENOUS at 08:24

## 2020-01-18 RX ADMIN — Medication 10 ML: at 06:29

## 2020-01-18 RX ADMIN — ASPIRIN 81 MG: 81 TABLET, CHEWABLE ORAL at 09:14

## 2020-01-18 RX ADMIN — ACETAMINOPHEN 650 MG: 325 TABLET ORAL at 01:47

## 2020-01-18 RX ADMIN — VANCOMYCIN HYDROCHLORIDE 1750 MG: 10 INJECTION, POWDER, LYOPHILIZED, FOR SOLUTION INTRAVENOUS at 13:08

## 2020-01-18 RX ADMIN — Medication 10 ML: at 14:00

## 2020-01-18 RX ADMIN — ENOXAPARIN SODIUM 40 MG: 40 INJECTION SUBCUTANEOUS at 09:14

## 2020-01-18 RX ADMIN — VANCOMYCIN HYDROCHLORIDE 1750 MG: 10 INJECTION, POWDER, LYOPHILIZED, FOR SOLUTION INTRAVENOUS at 04:14

## 2020-01-18 NOTE — PROGRESS NOTES
[facility] Dosing Services: 1/17/20    Pharmacy note for Dr Virgie Taylor regarding Lovenox dose adjustment for increased BMI:    Wt Readings from Last 1 Encounters:   01/17/20 117.9 kg (260 lb)       Ht Readings from Last 1 Encounters:   01/17/20 152.4 cm (60\")           Previous Dose 40mg q 24h   Creatinine Clearance Estimated Creatinine Clearance: 73 mL/min (A) (based on SCr of 1.06 mg/dL (H)). Creatinine Lab Results   Component Value Date/Time    Creatinine 1.06 (H) 01/17/2020 01:40 PM    Creatinine (POC) 1.0 05/28/2015 02:35 PM       Platelet Lab Results   Component Value Date/Time    PLATELET 171 36/91/9122 01:40 PM      H/H Lab Results   Component Value Date/Time    HGB 12.3 01/17/2020 01:40 PM         Pharmacist made change to enoxaparin therapy based on:    [ x ] BMI: dose changed to: 40mg sq q12h    Pharmacy to automatically make dose adjustment for obesity (BMI greater than 40)  Pharmacy to make dose rounding adjustments per [facility] dose adjustment scale.     Thalia Kulkarni, PHARMD . Contact information: [phone]

## 2020-01-18 NOTE — PROGRESS NOTES
2039  Pt c/o non-productive cough, requesting cough suppressant. Dr. Elmo Hernandez notified, MD gave order for tessalon pearls. 0  Pt c/o itching \"all over\", requesting benadryl. Dr. Elmo Hernandez notified, MD gave order for benadryl 25mg IV q6h prn. Benadryl administered to pt.   2314  Pt reported relief of itching.   0106  Pt stated she was unable to sleep and requested some type of sleep aid. RN also noticed that pt's hands and feet were more swollen than previously. RN notified Dr. Elmo Hernandez MD gave order for 5mg melatonin PO once and also ordered pt's continuous fluids to be stopped. 8034  PCT obtained VS, pt's HR elevated at 119, MEWS of 3. RN in room to assess pt, pt in no apparent distress,  on recheck. RN made Dr. Elmo Hernandez aware of elevated HR/MEWS. Will continue to monitor.

## 2020-01-18 NOTE — PROGRESS NOTES
[facility]    Medical Progress Note      NAME: Rachel Griffith   :  1965  MRM:  [id number]    Date/Time: 2020  9:32 AM          Assessment and Plan:     Systemic inflammatory response syndrome (SIRS) / Fever / Sinus tachycardia - POA, unclear etiology. Does not appear ill. No fever for 20 hr.  Was placed on empiric meropenum and vancomycin by ER, I stopped that. Procalcitonin negative. CT with persistent unchanged infiltrates, thus likely a chronic issue. Pulmonary consulted. I want to send her home today without abx. ILD (interstitial lung disease) / Asthma - Breathing RA. Consulted pulmonary. RVP negative. No sputum for lab. Prn duonebs and tessalon. Not on long acting inhalers    Type 2 diabetes mellitus, without long-term current use of insulin - Diabetic diet and counseling. SSI per protocol. Continue home . Check A1c. Super morbid obese - POA, advise weight loss. Hypertension - Continued clonidine, diltiazem, enalapril and ASA       Subjective:     Chief Complaint:  Feels better    ROS:  (bold if positive, if negative)    Tolerating PT  Tolerating Diet        Objective:     Last 24hrs VS reviewed since prior progress note.  Most recent are:    Visit Vitals  /74 (BP 1 Location: Left arm, BP Patient Position: At rest)   Pulse (!) 111   Temp 99.1 °F (37.3 °C)   Resp 20   Ht 5' (1.524 m)   Wt 125.3 kg (276 lb 3.8 oz)   SpO2 96%   BMI 53.95 kg/m²     SpO2 Readings from Last 6 Encounters:   20 96%   19 96%   19 94%   19 99%   19 96%   18 98%            Intake/Output Summary (Last 24 hours) at 2020 0932  Last data filed at 2020 0753  Gross per 24 hour   Intake 1652 ml   Output 300 ml   Net 1352 ml        Physical Exam:    Gen:  Super morbid obese, in no acute distress  HEENT:  Pink conjunctivae, PERRL, hearing intact to voice, moist mucous membranes  Neck:  Supple, without masses, thyroid non-tender  Resp:  No accessory muscle use, clear breath sounds without wheezes rales or rhonchi  Card:  No murmurs, tachycardia S1, S2 without thrills, bruits or peripheral edema  Abd:  Soft, non-tender, non-distended, normoactive bowel sounds are present, no mass  Lymph:  No cervical or inguinal adenopathy  Musc:  No cyanosis or clubbing  Skin:  No rashes or ulcers, skin turgor is good  Neuro:  Cranial nerves are grossly intact, no focal motor weakness, follows commands appropriately  Psych:  Good insight, oriented to person, place and time, alert    Telemetry reviewed:   normal sinus rhythm  __________________________________________________________________  Medications Reviewed: (see below)  Medications:     Current Facility-Administered Medications   Medication Dose Route Frequency    sodium chloride (NS) flush 5-10 mL  5-10 mL IntraVENous PRN    albuterol-ipratropium (DUO-NEB) 2.5 MG-0.5 MG/3 ML  3 mL Nebulization Q4H PRN    meropenem (MERREM) 500 mg in 0.9% sodium chloride (MBP/ADV) 50 mL  500 mg IntraVENous Q6H    vancomycin (VANCOCIN) 1,750 mg in 0.9% sodium chloride 500 mL IVPB  1,750 mg IntraVENous Q8H    sodium chloride (NS) flush 5-40 mL  5-40 mL IntraVENous Q8H    sodium chloride (NS) flush 5-40 mL  5-40 mL IntraVENous PRN    acetaminophen (TYLENOL) tablet 650 mg  650 mg Oral Q4H PRN    0.9% sodium chloride infusion  100 mL/hr IntraVENous CONTINUOUS    ondansetron (ZOFRAN) injection 4 mg  4 mg IntraVENous Q4H PRN    enoxaparin (LOVENOX) injection 40 mg  40 mg SubCUTAneous Q12H    insulin lispro (HUMALOG) injection   SubCUTAneous QID WITH MEALS    glucose chewable tablet 16 g  4 Tab Oral PRN    glucagon (GLUCAGEN) injection 1 mg  1 mg IntraMUSCular PRN    dextrose 10% infusion 0-250 mL  0-250 mL IntraVENous PRN    aspirin chewable tablet 81 mg  81 mg Oral DAILY    cloNIDine HCL (CATAPRES) tablet 0.1 mg  0.1 mg Oral QHS    dilTIAZem CD (CARDIZEM CD) capsule 240 mg  240 mg Oral DAILY    enalapril (VASOTEC) tablet 5 mg (Patient Supplied)  5 mg Oral DAILY    famotidine (PEPCID) tablet 20 mg  20 mg Oral DAILY    ketotifen (ZADITOR) 0.025 % (0.035 %) ophthalmic solution 1 Drop  1 Drop Both Eyes BID    therapeutic multivitamin (THERAGRAN) tablet 1 Tab  1 Tab Oral DAILY    benzonatate (TESSALON) capsule 100 mg  100 mg Oral TID PRN    diphenhydrAMINE (BENADRYL) injection 25 mg  25 mg IntraVENous Q6H PRN        Lab Data Reviewed: (see below)  Lab Review:     Recent Labs     01/18/20  0025 01/17/20  1340   WBC 8.3 16.9*   HGB 10.0* 12.3   HCT 31.3* 37.6    394     Recent Labs     01/18/20  0025 01/17/20  1340    137   K 3.5 3.8   * 101   CO2 25 29   * 96   BUN 10 10   CREA 0.76 1.06*   CA 8.3* 9.1   MG 2.0  --    ALB  --  3.7   TBILI  --  0.4   SGOT  --  33   ALT  --  37     Lab Results   Component Value Date/Time    Glucose (POC) 106 (H) 01/18/2020 06:53 AM    Glucose (POC) 93 01/17/2020 09:32 PM    Glucose (POC) 94 03/25/2019 10:32 PM    Glucose (POC) 146 (H) 03/25/2019 04:15 PM    Glucose (POC) 122 (H) 03/25/2019 09:58 AM     No results for input(s): PH, PCO2, PO2, HCO3, FIO2 in the last 72 hours. No results for input(s): INR, INREXT in the last 72 hours.   All Micro Results     Procedure Component Value Units Date/Time    CULTURE, BLOOD [[id number]] Collected:  01/17/20 1340    Order Status:  Completed Specimen:  Blood Updated:  01/18/20 0748    CULTURE, BLOOD [[id number]] Collected:  01/17/20 1340    Order Status:  Completed Specimen:  Blood Updated:  01/18/20 0747    CULTURE, URINE [[id number]] Collected:  01/17/20 1610    Order Status:  Completed Updated:  01/17/20 2223    RESPIRATORY PANEL,PCR,NASOPHARYNGEAL [[id number]] Collected:  01/17/20 1755    Order Status:  Completed Specimen:  Nasopharyngeal Updated:  01/17/20 2115     Adenovirus NOT DETECTED        Coronavirus 229E NOT DETECTED        Coronavirus HKU1 NOT DETECTED        Coronavirus CVNL63 NOT DETECTED        Coronavirus OC43 NOT DETECTED        Metapneumovirus NOT DETECTED        Rhinovirus and Enterovirus NOT DETECTED        Influenza A NOT DETECTED        Influenza A, subtype H1 NOT DETECTED        Influenza A, subtype H3 NOT DETECTED        INFLUENZA A H1N1 PCR NOT DETECTED        Influenza B NOT DETECTED        Parainfluenza 1 NOT DETECTED        Parainfluenza 2 NOT DETECTED        Parainfluenza 3 NOT DETECTED        Parainfluenza virus 4 NOT DETECTED        RSV by PCR NOT DETECTED        B. parapertussis, PCR NOT DETECTED        Bordetella pertussis - PCR NOT DETECTED        Chlamydophila pneumoniae DNA, QL, PCR NOT DETECTED        Mycoplasma pneumoniae DNA, QL, PCR NOT DETECTED       RESPIRATORY PANEL,PCR,NASOPHARYNGEAL [[id number]] Collected:  01/17/20 1755    Order Status:  Canceled Specimen:  NASOPHARYNGEAL SWAB     URINE CULTURE HOLD SAMPLE [[id number]] Collected:  01/17/20 1610    Order Status:  Completed Specimen:  Urine from Serum Updated:  01/17/20 1627     Urine culture hold       URINE ON HOLD IN MICROBIOLOGY DEPT FOR 3 DAYS. IF UNPRESERVED URINE IS SUBMITTED, IT CANNOT BE USED FOR ADDITIONAL TESTING AFTER 24 HRS, RECOLLECTION WILL BE REQUIRED. INFLUENZA A & B AG (RAPID TEST) [[id number]] Collected:  01/17/20 1432    Order Status:  Completed Specimen:  Nasopharyngeal from Nasal washing Updated:  01/17/20 1458     Influenza A Antigen NEGATIVE         Influenza B Antigen NEGATIVE              Other pertinent lab: none    Total time spent with patient: 39 Minutes I reviewed chart, notes, data and current medications in the medical record. I have examined and treated the patient at bedside during this period.                  Care Plan discussed with: Patient, Care Manager, Nursing Staff, Consultant/Specialist and >50% of time spent in counseling and coordination of care    Discussed:  Care Plan and D/C Planning    Prophylaxis:  H2B/PPI    Disposition:  Home w/Family           ___________________________________________________    Attending Physician: Rekha Canseco MD

## 2020-01-18 NOTE — ED NOTES
Verbal report given to Felton Rodriguez RN(name) on Chito Delay being transferred to [facility](unit) for routine progression of care    Report consisted of patient's Situation, Background, Assessment and Recommendations (SBAR)    Information from the following report(s)  SBAR, Kardex, ED Summary, MAR and Recent Results was reviewed with the receiving nurse. Opportunity for questions and clarification was provided. Patient transported with:  Monitor    Last Filed Values:  Temp: 99.6 °F (37.6 °C) (01/17/20 1700)  Pulse (Heart Rate): (!) 105 (01/17/20 1845)  Resp Rate: 23 (01/17/20 1845)  O2 Sat (%): 98 % (01/17/20 1845)  BP: 126/77 (01/17/20 1845)  MAP (Monitor): 89 (01/17/20 1845)  MAP (Calculated): 132 (01/17/20 1325)  Level of Consciousness: Alert (01/17/20 1700)      Lab Results   Component Value Date/Time    WBC 16.9 (H) 01/17/2020 01:40 PM       Repeat LA:  Time Due no repeat lactic indicated. Blood Cultures Drawn:  yes    Fluid Resuscitation:  Total needed 3537, Status completed, amount 3537    All Antibiotics Started:  no, Dose Due merrem    VS x 2 post-fluid resuscitation:   yes    Vasopressor Infusion:  no     Provider Reassessment needed and notified:  yes , Due completed. Additional Interventions/Comments:  None.

## 2020-01-18 NOTE — DISCHARGE SUMMARY
Physician Discharge Summary     Patient ID:  Tariq Rice  [id number]  48 y.o.  1965    Admit date: 1/17/2020    Discharge date and time: 1/18/2020    Admission Diagnoses: Fever [R50.9]    Discharge Diagnoses:    Principal Diagnosis   Fever                                             Hospital Course and other diagnoses  Systemic inflammatory response syndrome (SIRS) / Fever / Sinus tachycardia - POA, unclear etiology. Does not appear ill. Feels great. No fever, cough, vomiting, dyspnea for 20 hr.  Was placed on empiric meropenum and vancomycin by ER, I stopped that. Procalcitonin negative. CT with persistent (unchanged over 1 year) infiltrates, thus likely a chronic issue. Pulmonary consulted. I want to send her home today without additional abx. She was already on levaquin, and I'll not stop it, although I don't think she has a bacterial infection.     ILD (interstitial lung disease) / Asthma - Breathing RA. Consulted pulmonary. RVP negative. No sputum for lab. Prn duonebs and tessalon. Not on long acting inhalers     Type 2 diabetes mellitus, without long-term current use of insulin - Diabetic diet and counseling. SSI per protocol. Continue home . Check A1c.     Super morbid obese - POA, advise weight loss.     Hypertension / Tachycardia - Continued clonidine, diltiazem, enalapril and ASA       PCP: Mingo Myrick MD    Consults: Pulmonary/Intensive care    Significant Diagnostic Studies: See Hospital Course    Discharged home in improved condition.     Discharge Exam:  /74 (BP 1 Location: Left arm, BP Patient Position: At rest)   Pulse (!) 111   Temp 99.1 °F (37.3 °C)   Resp 20   Ht 5' (1.524 m)   Wt 125.3 kg (276 lb 3.8 oz)   SpO2 96%   BMI 53.95 kg/m²      Gen:  Super morbid obese, in no acute distress  HEENT:  Pink conjunctivae, PERRL, hearing intact to voice, moist mucous membranes  Neck:  Supple, without masses, thyroid non-tender  Resp:  No accessory muscle use, clear breath sounds without wheezes rales or rhonchi  Card:  No murmurs, tachycardia S1, S2 without thrills, bruits or peripheral edema  Abd:  Soft, non-tender, non-distended, normoactive bowel sounds are present, no mass  Lymph:  No cervical or inguinal adenopathy  Musc:  No cyanosis or clubbing  Skin:  No rashes or ulcers, skin turgor is good  Neuro:  Cranial nerves are grossly intact, no focal motor weakness, follows commands appropriately  Psych:  Good insight, oriented to person, place and time, alert    Patient Instructions:   Current Discharge Medication List      START taking these medications    Details   ondansetron (ZOFRAN ODT) 4 mg disintegrating tablet Take 1 Tab by mouth every eight (8) hours as needed for Nausea or Vomiting. Qty: 8 Tab, Refills: 0         CONTINUE these medications which have CHANGED    Details   metFORMIN ER (GLUCOPHAGE XR) 500 mg tablet TAKE 2 TABLETS BY MOUTH TWICE A DAY. HOLD off for 2 days  Qty: 30 Tab, Refills: 2         CONTINUE these medications which have NOT CHANGED    Details   albuterol-ipratropium (DUO-NEB) 2.5 mg-0.5 mg/3 ml nebu 3 mL by Nebulization route every six (6) hours as needed for Wheezing. cloNIDine HCL (CATAPRES) 0.1 mg tablet Take 0.1 mg by mouth nightly.      ergocalciferol (ERGOCALCIFEROL) 1,250 mcg (50,000 unit) capsule Take 50,000 Units by mouth every Monday. dulaglutide (TRULICITY) 1.5 CL/3.6 mL sub-q pen 1.5 mg by SubCUTAneous route every Tuesday. olopatadine (PATADAY) 0.2 % drop ophthalmic solution Administer 1 Drop to both eyes daily. famotidine (PEPCID) 20 mg tablet Take 20 mg by mouth daily. torsemide (DEMADEX) 20 mg tablet Take 20 mg by mouth two (2) times a day. levoFLOXacin (LEVAQUIN) 750 mg tablet Take 750 mg by mouth daily. aspirin 81 mg chewable tablet Take 81 mg by mouth daily.       DILT- mg XR capsule TAKE 1 CAPSULE BY MOUTH EVERY DAY ON EMPTY STOMACH IN THE MORNING  Refills: 2      enalapril (VASOTEC) 5 mg tablet Take 5 mg by mouth daily. Refills: 1      therapeutic multivitamin (THERAGRAN) tablet Take 1 Tab by mouth daily. albuterol (PROVENTIL HFA, VENTOLIN HFA) 90 mcg/actuation inhaler Take 2 Puffs by inhalation every six (6) hours as needed for Wheezing. Qty: 1 Inhaler, Refills: 1    Associated Diagnoses: Asthma           Activity: Activity as tolerated  Diet: Cardiac Diet, Diabetic Diet and Low fat, Low cholesterol  Wound Care: None needed    Follow-up with your PCP in a few weeks.   Follow-up tests/labs - none    Signed:  Lydia Rome MD  1/18/2020  12:21 PM

## 2020-01-18 NOTE — PROGRESS NOTES
3031 Patient down to CT.    0900 Patient returned from 1700 [facility] RN contacted on call pulmonologist in order to get discharge possible information for patient. 18 MD Roque called back. Patient is good from pulmonology standpoint. Will follow up outpatient. Discharge information provided to patient and family member. IV removed. Time for questions and concerns allowed. Paperwork provided. Patient family will transport patient home. Volunteer request placed.

## 2020-01-18 NOTE — PROGRESS NOTES
Bedside and Verbal shift change report given to Marcin Paredes RN (oncoming nurse) by Shaye Rose RN (offgoing nurse). Report included the following information SBAR, Kardex, ED Summary, Intake/Output, MAR and Recent Results.

## 2020-01-18 NOTE — DISCHARGE INSTRUCTIONS
Patient Discharge Instructions    Dagoberto Contreras / [id number] : 1965    Admitted 2020 Discharged: 2020     Primary Diagnoses  Problem List as of 2020 Date Reviewed: 2020           Sinus tachycardia   * (Principal) Fever   Type 2 diabetes mellitus, without long-term current use of insulin (HCC)   JENNIFER on CPAP   ILD (interstitial lung disease) ([facility] [address]   Asthma with exacerbation   Hypertension   Systemic inflammatory response syndrome (SIRS) (HCC)   Multifocal pneumonia   Diastolic murmur   Obesity   Asthma          Take Home Medications     · It is important that you take the medication exactly as they are prescribed. · Keep your medication in the bottles provided by the pharmacist and keep a list of the medication names, dosages, and times to be taken in your wallet. · Do not take other medications without consulting your doctor. What to do at Home    Recommended diet: Cardiac Diet, Diabetic Diet, Low fat, Low cholesterol and Encourage fluids    Recommended activity: Activity as tolerated    If you experience worse symptoms, please follow up with your PCP or pulmonary doctor. Follow-up with your PCP in a few weeks        Information obtained by :  I understand that if any problems occur once I am at home I am to contact my physician. I understand and acknowledge receipt of the instructions indicated above.                                                                                                                                            Physician's or R.N.'s Signature                                                                  Date/Time                                                                                                                                              Patient or Representative Signature                                                          Date/Time

## 2020-01-18 NOTE — PROGRESS NOTES
TRANSFER - IN REPORT:    Verbal report received from Mariella Oconnor RN(name) on Vamshi Show  being received from ED(unit) for routine progression of care      Report consisted of patients Situation, Background, Assessment and   Recommendations(SBAR). Information from the following report(s) SBAR, Kardex, ED Summary, Intake/Output, MAR and Recent Results was reviewed with the receiving nurse. Opportunity for questions and clarification was provided. Assessment completed upon patients arrival to unit and care assumed.

## 2020-01-19 LAB
BACTERIA SPEC CULT: NORMAL
CC UR VC: NORMAL
SERVICE CMNT-IMP: NORMAL

## 2020-01-28 NOTE — ADT AUTH CERT NOTES
DOWNGRADED TO OBSERVATION    ONCE RECEIVED AND COMPLETED, PLEASE FAX BACK CONFIRMATION AND NEW OBS AUTH# OR WHETHER OR NOT OBS REQUIRES AN AUTH.     Rosalina Bahena

## 2021-02-25 ENCOUNTER — APPOINTMENT (OUTPATIENT)
Dept: GENERAL RADIOLOGY | Age: 56
End: 2021-02-25
Attending: EMERGENCY MEDICINE
Payer: COMMERCIAL

## 2021-02-25 ENCOUNTER — APPOINTMENT (OUTPATIENT)
Dept: CT IMAGING | Age: 56
End: 2021-02-25
Attending: NURSE PRACTITIONER
Payer: COMMERCIAL

## 2021-02-25 ENCOUNTER — HOSPITAL ENCOUNTER (EMERGENCY)
Age: 56
Discharge: HOME | End: 2021-02-25
Attending: EMERGENCY MEDICINE
Payer: COMMERCIAL

## 2021-02-25 VITALS
OXYGEN SATURATION: 97 % | SYSTOLIC BLOOD PRESSURE: 114 MMHG | TEMPERATURE: 98.1 F | HEART RATE: 106 BPM | DIASTOLIC BLOOD PRESSURE: 80 MMHG | RESPIRATION RATE: 20 BRPM

## 2021-02-25 DIAGNOSIS — R06.02 SOB (SHORTNESS OF BREATH): Primary | ICD-10-CM

## 2021-02-25 LAB
ALBUMIN SERPL-MCNC: 4.1 G/DL (ref 3.5–5)
ALBUMIN/GLOB SERPL: 0.8 {RATIO} (ref 1.1–2.2)
ALP SERPL-CCNC: 202 U/L (ref 45–117)
ALT SERPL-CCNC: 34 U/L (ref 12–78)
ANION GAP SERPL CALC-SCNC: 9 MMOL/L (ref 5–15)
APPEARANCE UR: CLEAR
AST SERPL-CCNC: 24 U/L (ref 15–37)
ATRIAL RATE: 110 BPM
BACTERIA URNS QL MICRO: NEGATIVE /HPF
BASOPHILS # BLD: 0.1 K/UL (ref 0–0.1)
BASOPHILS NFR BLD: 1 % (ref 0–1)
BILIRUB SERPL-MCNC: 0.2 MG/DL (ref 0.2–1)
BILIRUB UR QL: NEGATIVE
BNP SERPL-MCNC: 22 PG/ML
BUN SERPL-MCNC: 14 MG/DL (ref 6–20)
BUN/CREAT SERPL: 14 (ref 12–20)
CALCIUM SERPL-MCNC: 9.5 MG/DL (ref 8.5–10.1)
CALCULATED P AXIS, ECG09: 63 DEGREES
CALCULATED R AXIS, ECG10: 59 DEGREES
CALCULATED T AXIS, ECG11: 10 DEGREES
CHLORIDE SERPL-SCNC: 106 MMOL/L (ref 97–108)
CO2 SERPL-SCNC: 24 MMOL/L (ref 21–32)
COLOR UR: NORMAL
COMMENT, HOLDF: NORMAL
COVID-19 RAPID TEST, COVR: NOT DETECTED
CREAT SERPL-MCNC: 0.98 MG/DL (ref 0.55–1.02)
D DIMER PPP FEU-MCNC: 0.96 MG/L FEU (ref 0–0.65)
DIAGNOSIS, 93000: NORMAL
DIFFERENTIAL METHOD BLD: ABNORMAL
EOSINOPHIL # BLD: 0.3 K/UL (ref 0–0.4)
EOSINOPHIL NFR BLD: 4 % (ref 0–7)
EPITH CASTS URNS QL MICRO: NORMAL /LPF
ERYTHROCYTE [DISTWIDTH] IN BLOOD BY AUTOMATED COUNT: 13 % (ref 11.5–14.5)
GLOBULIN SER CALC-MCNC: 4.9 G/DL (ref 2–4)
GLUCOSE SERPL-MCNC: 89 MG/DL (ref 65–100)
GLUCOSE UR STRIP.AUTO-MCNC: NEGATIVE MG/DL
HCT VFR BLD AUTO: 39.3 % (ref 35–47)
HGB BLD-MCNC: 12.2 G/DL (ref 11.5–16)
HGB UR QL STRIP: NEGATIVE
HYALINE CASTS URNS QL MICRO: NORMAL /LPF (ref 0–5)
IMM GRANULOCYTES # BLD AUTO: 0 K/UL (ref 0–0.04)
IMM GRANULOCYTES NFR BLD AUTO: 1 % (ref 0–0.5)
KETONES UR QL STRIP.AUTO: NEGATIVE MG/DL
LACTATE SERPL-SCNC: 2.8 MMOL/L (ref 0.4–2)
LACTATE SERPL-SCNC: 3.2 MMOL/L (ref 0.4–2)
LEUKOCYTE ESTERASE UR QL STRIP.AUTO: NEGATIVE
LYMPHOCYTES # BLD: 3.4 K/UL (ref 0.8–3.5)
LYMPHOCYTES NFR BLD: 40 % (ref 12–49)
MCH RBC QN AUTO: 28.2 PG (ref 26–34)
MCHC RBC AUTO-ENTMCNC: 31 G/DL (ref 30–36.5)
MCV RBC AUTO: 90.8 FL (ref 80–99)
MONOCYTES # BLD: 0.6 K/UL (ref 0–1)
MONOCYTES NFR BLD: 8 % (ref 5–13)
NEUTS SEG # BLD: 4.1 K/UL (ref 1.8–8)
NEUTS SEG NFR BLD: 46 % (ref 32–75)
NITRITE UR QL STRIP.AUTO: NEGATIVE
NRBC # BLD: 0 K/UL (ref 0–0.01)
NRBC BLD-RTO: 0 PER 100 WBC
P-R INTERVAL, ECG05: 164 MS
PH UR STRIP: 5 [PH] (ref 5–8)
PLATELET # BLD AUTO: 368 K/UL (ref 150–400)
PMV BLD AUTO: 10.7 FL (ref 8.9–12.9)
POTASSIUM SERPL-SCNC: 4.2 MMOL/L (ref 3.5–5.1)
PROT SERPL-MCNC: 9 G/DL (ref 6.4–8.2)
PROT UR STRIP-MCNC: NEGATIVE MG/DL
Q-T INTERVAL, ECG07: 344 MS
QRS DURATION, ECG06: 68 MS
QTC CALCULATION (BEZET), ECG08: 465 MS
RBC # BLD AUTO: 4.33 M/UL (ref 3.8–5.2)
RBC #/AREA URNS HPF: NORMAL /HPF (ref 0–5)
SAMPLES BEING HELD,HOLD: NORMAL
SODIUM SERPL-SCNC: 139 MMOL/L (ref 136–145)
SOURCE, COVRS: NORMAL
SP GR UR REFRACTOMETRY: 1.01 (ref 1–1.03)
TROPONIN I SERPL-MCNC: <0.05 NG/ML
UR CULT HOLD, URHOLD: NORMAL
UROBILINOGEN UR QL STRIP.AUTO: 0.2 EU/DL (ref 0.2–1)
VENTRICULAR RATE, ECG03: 110 BPM
WBC # BLD AUTO: 8.5 K/UL (ref 3.6–11)
WBC URNS QL MICRO: NORMAL /HPF (ref 0–4)

## 2021-02-25 PROCEDURE — 87635 SARS-COV-2 COVID-19 AMP PRB: CPT

## 2021-02-25 PROCEDURE — 85379 FIBRIN DEGRADATION QUANT: CPT

## 2021-02-25 PROCEDURE — 80053 COMPREHEN METABOLIC PANEL: CPT

## 2021-02-25 PROCEDURE — 71275 CT ANGIOGRAPHY CHEST: CPT

## 2021-02-25 PROCEDURE — 83880 ASSAY OF NATRIURETIC PEPTIDE: CPT

## 2021-02-25 PROCEDURE — 74011000636 HC RX REV CODE- 636: Performed by: EMERGENCY MEDICINE

## 2021-02-25 PROCEDURE — 81001 URINALYSIS AUTO W/SCOPE: CPT

## 2021-02-25 PROCEDURE — 96360 HYDRATION IV INFUSION INIT: CPT

## 2021-02-25 PROCEDURE — 84484 ASSAY OF TROPONIN QUANT: CPT

## 2021-02-25 PROCEDURE — 85025 COMPLETE CBC W/AUTO DIFF WBC: CPT

## 2021-02-25 PROCEDURE — 99284 EMERGENCY DEPT VISIT MOD MDM: CPT

## 2021-02-25 PROCEDURE — 71045 X-RAY EXAM CHEST 1 VIEW: CPT

## 2021-02-25 PROCEDURE — 74011250636 HC RX REV CODE- 250/636: Performed by: NURSE PRACTITIONER

## 2021-02-25 PROCEDURE — 83605 ASSAY OF LACTIC ACID: CPT

## 2021-02-25 PROCEDURE — 93005 ELECTROCARDIOGRAM TRACING: CPT

## 2021-02-25 PROCEDURE — 36415 COLL VENOUS BLD VENIPUNCTURE: CPT

## 2021-02-25 RX ADMIN — SODIUM CHLORIDE 1000 ML: 9 INJECTION, SOLUTION INTRAVENOUS at 12:00

## 2021-02-25 RX ADMIN — IOPAMIDOL 80 ML: 755 INJECTION, SOLUTION INTRAVENOUS at 14:40

## 2021-02-25 NOTE — Clinical Note
[address] 
[address] [facility] [address] [id number] [phone] Work/School Note Date: 2/25/2021 To Whom It May concern: 
 
Jeremiah Covington was seen and treated today in the emergency room by the following provider(s): 
Attending Provider: Shira Patton MD 
Nurse Practitioner: Shiloh Lim NP. Jeremiah Covington is excused from work/school on 02/25/21 and 02/26/21. She is medically clear to return to work/school on 2/27/2021. Sincerely, Maria M Valadez NP

## 2021-02-25 NOTE — DISCHARGE INSTRUCTIONS
1. Please STOP taking your Metformin for the time being due to your elevated lactic acid level. 2. Please start taking your prescribed torsemide 20 mg twice daily. Dr. Isidro Maria office will be reaching out to you for a follow up appointment soon.

## 2021-02-25 NOTE — ED TRIAGE NOTES
Pt c/o mid chest pain and sob since Tuesday, denies fever or chills, pt works at healthcare facility, +nausea, denies diarrhea, +fatigue

## 2021-02-25 NOTE — ED PROVIDER NOTES
This is a 22-year-old female with a past medical history including unspecified tachyarrhythmia, asthma, recurrent pneumonia, well-controlled diabetes, dyslipidemia, GERD, hypertension, JENNIFER, and RA (not on immunosuppression) who presents the ER today for evaluation of fatigue, cough, and shortness of breath. According to the patient, she started to feel unwell several days ago and her symptoms have continued to progress since. She states that last night she developed a cough and shortness of breath which seem to be minimally improved with taking her as needed albuterol inhaler and seems to be slightly worsened while laying supine. She also states that she feels like she is having \"some fluid buildup\" in her lower extremities which is a chronic intermittent problem for her, and she took 1 dose of her prescribed torsemide tabs with no significant improvement in her swelling. She also reports vague low-grade nonexertional chest pain and a sensation of mild intermittent dizziness since her symptoms started as well. She is currently employed in [employer] but reports having a negative COVID-19 test on Monday of this week. Patient states that while she was at work today one of the healthcare providers checked her vitals and said that her heart rate was in the 140s and she felt short of breath at that time and recommended that she come in for an evaluation. She denies any history of congestive heart failure. Additional ROS negative for: Syncope, vomiting, abdominal pain/distention, bloody stool, productive cough, history of VTE, anosmia or aguesia, fever/chills, urinary complaints.              Past Medical History:   Diagnosis Date    Arrhythmia     tachycardia  (Zara Alex)    Asthma 6/30/2013    inhaler used    Diabetes ([facility] [address]     pre diabetic per patient     Dyslipidemia     GERD (gastroesophageal reflux disease)     Hypertension     Obesity 6/9/2014    JENNIFER on CPAP     Pneumonia 10/2018  RA (rheumatoid arthritis) ([facility] [address]     KNEES    Sepsis ([facility] [address] 10/2018       Past Surgical History:   Procedure Laterality Date    ENDOSCOPY, COLON, DIAGNOSTIC      HX  SECTION      x2    HX CHOLECYSTECTOMY  2006    HX HYSTERECTOMY  1997    complete, endometriosis.      IR BRONCHOSCOPY  2018         Family History:   Problem Relation Age of Onset    Other Mother         CAD    Hypertension Mother     Hypertension Father     Kidney Disease Father         End Stage Renal, Dialysis    Hypertension Brother     Diabetes Paternal Uncle     Other Maternal Grandmother         CAD    Heart Attack Maternal Grandmother     Cancer Maternal Grandfather         Throat Cancer    Dementia Paternal Grandmother         Alzheimer's Dementia    Cancer Paternal Grandfather         Colon Cancer    Breast Cancer Maternal Aunt 62        mothers 1/2 sister    Diabetes Paternal Uncle     Anesth Problems Neg Hx        Social History     Socioeconomic History    Marital status:      Spouse name: Not on file    Number of children: Not on file    Years of education: Not on file    Highest education level: Not on file   Occupational History    Not on file   Social Needs    Financial resource strain: Not on file    Food insecurity     Worry: Not on file     Inability: Not on file    Transportation needs     Medical: Not on file     Non-medical: Not on file   Tobacco Use    Smoking status: Never Smoker    Smokeless tobacco: Never Used   Substance and Sexual Activity    Alcohol use: No    Drug use: No    Sexual activity: Yes     Partners: Male     Birth control/protection: Surgical   Lifestyle    Physical activity     Days per week: Not on file     Minutes per session: Not on file    Stress: Not on file   Relationships    Social connections     Talks on phone: Not on file     Gets together: Not on file     Attends Zoroastrianism service: Not on file     Active member of club or organization: Not on file     Attends meetings of clubs or organizations: Not on file     Relationship status: Not on file    Intimate partner violence     Fear of current or ex partner: Not on file     Emotionally abused: Not on file     Physically abused: Not on file     Forced sexual activity: Not on file   Other Topics Concern    Not on file   Social History Narrative    Not on file         ALLERGIES: Patient has no known allergies. Review of Systems   Constitutional: Positive for appetite change and fatigue. Negative for fever. HENT: Negative for sore throat. Eyes: Negative for visual disturbance. Respiratory: Positive for cough, shortness of breath and wheezing. Negative for chest tightness. Cardiovascular: Negative for palpitations. Gastrointestinal: Positive for nausea. Negative for abdominal distention, abdominal pain and vomiting. Genitourinary: Negative for dysuria and flank pain. Musculoskeletal: Negative for myalgias. Skin: Negative for rash. Neurological: Positive for dizziness. Psychiatric/Behavioral: Negative for dysphoric mood. Vitals:    02/25/21 0914   BP: (!) 153/95   Pulse: (!) 112   Resp: 24   Temp: 98.1 °F (36.7 °C)   SpO2: 100%            Physical Exam  Vitals signs and nursing note reviewed. Constitutional:       General: She is not in acute distress. Appearance: She is obese. She is not diaphoretic. Comments: Appears fatigued   HENT:      Head: Normocephalic and atraumatic. Right Ear: External ear normal.      Left Ear: External ear normal.      Nose: Nose normal.      Mouth/Throat:      Mouth: Mucous membranes are moist.      Pharynx: Oropharynx is clear. No oropharyngeal exudate or posterior oropharyngeal erythema. Eyes:      General: No scleral icterus. Extraocular Movements: Extraocular movements intact. Right eye: No nystagmus. Left eye: No nystagmus.       Conjunctiva/sclera: Conjunctivae normal.      Pupils: Pupils are equal, round, and reactive to light. Neck:      Musculoskeletal: Normal range of motion and neck supple. No neck rigidity or muscular tenderness. Cardiovascular:      Rate and Rhythm: Regular rhythm. Tachycardia present. Pulses: Normal pulses. Radial pulses are 2+ on the right side and 2+ on the left side. Heart sounds: Normal heart sounds. No murmur. No friction rub. No gallop. Pulmonary:      Effort: Tachypnea present. Breath sounds: Decreased air movement present. Comments: Shallow respirations, marked tachypnea, diminished lung sounds bilaterally possibly due to body habitus or shallow inspiratory effort. Abdominal:      General: Abdomen is flat. Bowel sounds are normal.      Palpations: Abdomen is soft. Musculoskeletal: Normal range of motion. Right lower le+ Edema present. Left lower le+ Edema present. Skin:     General: Skin is warm and dry. Coloration: Skin is not pale. Neurological:      General: No focal deficit present. Mental Status: She is alert and oriented to person, place, and time. Psychiatric:         Mood and Affect: Mood normal.         Behavior: Behavior normal.         Thought Content:  Thought content normal.         Judgment: Judgment normal.          MDM     VITAL SIGNS:  Patient Vitals for the past 4 hrs:   Temp Pulse Resp BP SpO2   21 1354 98.1 °F (36.7 °C) (!) 106 20 114/80 97 %         LABS:  Recent Results (from the past 12 hour(s))   EKG, 12 LEAD, INITIAL    Collection Time: 21  9:45 AM   Result Value Ref Range    Ventricular Rate 110 BPM    Atrial Rate 110 BPM    P-R Interval 164 ms    QRS Duration 68 ms    Q-T Interval 344 ms    QTC Calculation (Bezet) 465 ms    Calculated P Axis 63 degrees    Calculated R Axis 59 degrees    Calculated T Axis 10 degrees    Diagnosis       Sinus tachycardia  Nonspecific ST abnormality    When compared with ECG of 2020 13:36,  The heart rate has decreased  Confirmed by Malorie Cuenca M.D., Jason Vanskbrennon ([id number]) on 2/25/2021 10:51:06 AM     CBC WITH AUTOMATED DIFF    Collection Time: 02/25/21  9:51 AM   Result Value Ref Range    WBC 8.5 3.6 - 11.0 K/uL    RBC 4.33 3.80 - 5.20 M/uL    HGB 12.2 11.5 - 16.0 g/dL    HCT 39.3 35.0 - 47.0 %    MCV 90.8 80.0 - 99.0 FL    MCH 28.2 26.0 - 34.0 PG    MCHC 31.0 30.0 - 36.5 g/dL    RDW 13.0 11.5 - 14.5 %    PLATELET 988 280 - 948 K/uL    MPV 10.7 8.9 - 12.9 FL    NRBC 0.0 0  WBC    ABSOLUTE NRBC 0.00 0.00 - 0.01 K/uL    NEUTROPHILS 46 32 - 75 %    LYMPHOCYTES 40 12 - 49 %    MONOCYTES 8 5 - 13 %    EOSINOPHILS 4 0 - 7 %    BASOPHILS 1 0 - 1 %    IMMATURE GRANULOCYTES 1 (H) 0.0 - 0.5 %    ABS. NEUTROPHILS 4.1 1.8 - 8.0 K/UL    ABS. LYMPHOCYTES 3.4 0.8 - 3.5 K/UL    ABS. MONOCYTES 0.6 0.0 - 1.0 K/UL    ABS. EOSINOPHILS 0.3 0.0 - 0.4 K/UL    ABS. BASOPHILS 0.1 0.0 - 0.1 K/UL    ABS. IMM. GRANS. 0.0 0.00 - 0.04 K/UL    DF AUTOMATED     TROPONIN I    Collection Time: 02/25/21  9:51 AM   Result Value Ref Range    Troponin-I, Qt. <0.05 <8.44 ng/mL   METABOLIC PANEL, COMPREHENSIVE    Collection Time: 02/25/21  9:51 AM   Result Value Ref Range    Sodium 139 136 - 145 mmol/L    Potassium 4.2 3.5 - 5.1 mmol/L    Chloride 106 97 - 108 mmol/L    CO2 24 21 - 32 mmol/L    Anion gap 9 5 - 15 mmol/L    Glucose 89 65 - 100 mg/dL    BUN 14 6 - 20 MG/DL    Creatinine 0.98 0.55 - 1.02 MG/DL    BUN/Creatinine ratio 14 12 - 20      GFR est AA >60 >60 ml/min/1.73m2    GFR est non-AA 59 (L) >60 ml/min/1.73m2    Calcium 9.5 8.5 - 10.1 MG/DL    Bilirubin, total 0.2 0.2 - 1.0 MG/DL    ALT (SGPT) 34 12 - 78 U/L    AST (SGOT) 24 15 - 37 U/L    Alk.  phosphatase 202 (H) 45 - 117 U/L    Protein, total 9.0 (H) 6.4 - 8.2 g/dL    Albumin 4.1 3.5 - 5.0 g/dL    Globulin 4.9 (H) 2.0 - 4.0 g/dL    A-G Ratio 0.8 (L) 1.1 - 2.2     SAMPLES BEING HELD    Collection Time: 02/25/21  9:51 AM   Result Value Ref Range    SAMPLES BEING HELD 1RED     COMMENT        Add-on orders for these samples will be processed based on acceptable specimen integrity and analyte stability, which may vary by analyte. NT-PRO BNP    Collection Time: 02/25/21  9:51 AM   Result Value Ref Range    NT pro-BNP 22 <125 PG/ML   LACTIC ACID    Collection Time: 02/25/21 10:53 AM   Result Value Ref Range    Lactic acid 3.2 (HH) 0.4 - 2.0 MMOL/L   D DIMER    Collection Time: 02/25/21 12:00 PM   Result Value Ref Range    D-dimer 0.96 (H) 0.00 - 0.65 mg/L FEU   URINALYSIS W/MICROSCOPIC    Collection Time: 02/25/21 12:03 PM   Result Value Ref Range    Color YELLOW/STRAW      Appearance CLEAR CLEAR      Specific gravity 1.011 1.003 - 1.030      pH (UA) 5.0 5.0 - 8.0      Protein Negative NEG mg/dL    Glucose Negative NEG mg/dL    Ketone Negative NEG mg/dL    Bilirubin Negative NEG      Blood Negative NEG      Urobilinogen 0.2 0.2 - 1.0 EU/dL    Nitrites Negative NEG      Leukocyte Esterase Negative NEG      WBC 0-4 0 - 4 /hpf    RBC 0-5 0 - 5 /hpf    Epithelial cells FEW FEW /lpf    Bacteria Negative NEG /hpf    Hyaline cast 0-2 0 - 5 /lpf   URINE CULTURE HOLD SAMPLE    Collection Time: 02/25/21 12:03 PM    Specimen: Serum; Urine   Result Value Ref Range    Urine culture hold        Urine on hold in Microbiology dept for 2 days. If unpreserved urine is submitted, it cannot be used for addtional testing after 24 hours, recollection will be required. LACTIC ACID    Collection Time: 02/25/21  1:08 PM   Result Value Ref Range    Lactic acid 2.8 (HH) 0.4 - 2.0 MMOL/L   COVID-19 RAPID TEST    Collection Time: 02/25/21  1:08 PM   Result Value Ref Range    Specimen source Nasopharyngeal      COVID-19 rapid test Not detected NOTD             IMAGING:  CTA CHEST W OR W WO CONT   Final Result   1. No acute pulmonary embolism. 2. Stable chronic airspace opacity in the lingula and superior segment of the   left lower lobe. Stable diffuse reticulonodular opacities in the right middle   lobe. No new lung nodule or mass.          XR CHEST PORT   Final Result Stable or recurrent left lower lung opacity since January 2020 that may   represent edema or nonspecific infection/inflammation. Medications During Visit:  Medications   sodium chloride 0.9 % bolus infusion 1,000 mL (0 mL IntraVENous IV Completed 2/25/21 1300)   iopamidoL (ISOVUE-370) 76 % injection 100 mL (80 mL IntraVENous Given 2/25/21 1440)         DECISION MAKING:  Alicia Sasm is a 50 y.o. female who comes in as above. Work-up shows no evidence of acute cardiopulmonary explanation for the patient's symptoms. She does have chronic stable opacities observed on the chest x-ray and chest CT, but there is no clear evidence of MI, heart failure, acute pneumonia, COVID-19, or pulmonary embolism. Initial lactic acid was 3.2 and with 1 L of hydration her lactic acid down trended and likewise did her heart rate I suspect she had. Mild dehydration due to poor p.o. intake for the last several days. No leukocytosis or evidence of UTI. After speaking with patient, I have a very low suspicion that she has COVID-19 due to receiving routine weekly screening tests and also having had already received the Moderna vaccine    I discussed the case with the patient's primary care doctor who recommended increasing her torsemide from 20 mg once daily (which is patient recently just already taking again) to 20 mg twice daily he will plan to follow-up with the patient soon for a reevaluation. I also instructed the patient to discontinue her Metformin due to unexplained lactic acidosis. The clinical decision making for this encounter included ordering and interpreting the above diagnostic tests with comparison to prior studies that are within our EMR. Past medical and surgical histories were reviewed, as were records from recent outpatient and emergency department visits. The above results discussed and reviewed with the patient.   Patient verbalized understanding of the care plan, including any changes to current outpatient medication regimen, discussed disease process, symptom control, and follow-up care. Return precautions reviewed. IMPRESSION:  1. SOB (shortness of breath)        DISPOSITION:  Discharged      Discharge Medication List as of 2/25/2021  3:31 PM      CONTINUE these medications which have NOT CHANGED    Details   albuterol-ipratropium (DUO-NEB) 2.5 mg-0.5 mg/3 ml nebu 3 mL by Nebulization route every six (6) hours as needed for Wheezing., Historical Med      cloNIDine HCL (CATAPRES) 0.1 mg tablet Take 0.1 mg by mouth nightly., Historical Med      ergocalciferol (ERGOCALCIFEROL) 1,250 mcg (50,000 unit) capsule Take 50,000 Units by mouth every Monday., Historical Med      dulaglutide (TRULICITY) 1.5 UG/0.1 mL sub-q pen 1.5 mg by SubCUTAneous route every Tuesday., Historical Med      olopatadine (PATADAY) 0.2 % drop ophthalmic solution Administer 1 Drop to both eyes daily. , Historical Med      famotidine (PEPCID) 20 mg tablet Take 20 mg by mouth daily. , Historical Med      torsemide (DEMADEX) 20 mg tablet Take 20 mg by mouth two (2) times a day., Historical Med      levoFLOXacin (LEVAQUIN) 750 mg tablet Take 750 mg by mouth daily. , Historical Med      ondansetron (ZOFRAN ODT) 4 mg disintegrating tablet Take 1 Tab by mouth every eight (8) hours as needed for Nausea or Vomiting., Print, Disp-8 Tab, R-0      aspirin 81 mg chewable tablet Take 81 mg by mouth daily. , Historical Med      DILT- mg XR capsule TAKE 1 CAPSULE BY MOUTH EVERY DAY ON EMPTY STOMACH IN THE MORNING, Historical Med, R-2, MAURIZIO      enalapril (VASOTEC) 5 mg tablet Take 5 mg by mouth daily. , Historical Med, R-1      therapeutic multivitamin (THERAGRAN) tablet Take 1 Tab by mouth daily. , Historical Med      albuterol (PROVENTIL HFA, VENTOLIN HFA) 90 mcg/actuation inhaler Take 2 Puffs by inhalation every six (6) hours as needed for Wheezing., Normal, Disp-1 Inhaler, R-1         STOP taking these medications metFORMIN ER (GLUCOPHAGE XR) 500 mg tablet Comments:   Reason for Stopping: Follow-up Information     Follow up With Specialties Details Why Fiordaliza Ontiveros MD Family Medicine  The office will call you to arrange an appointment shortly. [address]  [phone]      Your pulmonologist                The patient is asked to follow-up with their primary care provider in the next several days. They are to call tomorrow for an appointment. The patient is asked to return promptly for any increased concerns or worsening of symptoms. They can return to this emergency department or any other emergency department.

## 2021-03-03 ENCOUNTER — HOSPITAL ENCOUNTER (EMERGENCY)
Age: 56
Discharge: HOME | End: 2021-03-03
Attending: EMERGENCY MEDICINE
Payer: COMMERCIAL

## 2021-03-03 ENCOUNTER — APPOINTMENT (OUTPATIENT)
Dept: CT IMAGING | Age: 56
End: 2021-03-03
Attending: EMERGENCY MEDICINE
Payer: COMMERCIAL

## 2021-03-03 VITALS
DIASTOLIC BLOOD PRESSURE: 86 MMHG | BODY MASS INDEX: 50.97 KG/M2 | WEIGHT: 259.6 LBS | RESPIRATION RATE: 15 BRPM | HEIGHT: 60 IN | HEART RATE: 113 BPM | OXYGEN SATURATION: 100 % | TEMPERATURE: 99 F | SYSTOLIC BLOOD PRESSURE: 136 MMHG

## 2021-03-03 DIAGNOSIS — J18.9 COMMUNITY ACQUIRED PNEUMONIA OF LEFT LUNG, UNSPECIFIED PART OF LUNG: Primary | ICD-10-CM

## 2021-03-03 DIAGNOSIS — R79.89 ELEVATED LACTIC ACID LEVEL: ICD-10-CM

## 2021-03-03 DIAGNOSIS — R00.0 TACHYCARDIA: ICD-10-CM

## 2021-03-03 LAB
ALBUMIN SERPL-MCNC: 4.2 G/DL (ref 3.5–5)
ALBUMIN/GLOB SERPL: 0.8 {RATIO} (ref 1.1–2.2)
ALP SERPL-CCNC: 191 U/L (ref 45–117)
ALT SERPL-CCNC: 42 U/L (ref 12–78)
ANION GAP SERPL CALC-SCNC: 10 MMOL/L (ref 5–15)
APPEARANCE UR: CLEAR
AST SERPL-CCNC: 28 U/L (ref 15–37)
ATRIAL RATE: 117 BPM
BACTERIA URNS QL MICRO: NEGATIVE /HPF
BASOPHILS # BLD: 0 K/UL (ref 0–0.1)
BASOPHILS NFR BLD: 1 % (ref 0–1)
BILIRUB SERPL-MCNC: 0.3 MG/DL (ref 0.2–1)
BILIRUB UR QL: NEGATIVE
BNP SERPL-MCNC: 18 PG/ML
BUN SERPL-MCNC: 18 MG/DL (ref 6–20)
BUN/CREAT SERPL: 13 (ref 12–20)
CALCIUM SERPL-MCNC: 9.1 MG/DL (ref 8.5–10.1)
CALCULATED P AXIS, ECG09: 33 DEGREES
CALCULATED R AXIS, ECG10: -9 DEGREES
CALCULATED T AXIS, ECG11: -9 DEGREES
CHLORIDE SERPL-SCNC: 102 MMOL/L (ref 97–108)
CO2 SERPL-SCNC: 24 MMOL/L (ref 21–32)
COLOR UR: ABNORMAL
COMMENT, HOLDF: NORMAL
CREAT SERPL-MCNC: 1.39 MG/DL (ref 0.55–1.02)
DIAGNOSIS, 93000: NORMAL
DIFFERENTIAL METHOD BLD: NORMAL
EOSINOPHIL # BLD: 0.2 K/UL (ref 0–0.4)
EOSINOPHIL NFR BLD: 2 % (ref 0–7)
EPITH CASTS URNS QL MICRO: ABNORMAL /LPF
ERYTHROCYTE [DISTWIDTH] IN BLOOD BY AUTOMATED COUNT: 12.6 % (ref 11.5–14.5)
GLOBULIN SER CALC-MCNC: 5.2 G/DL (ref 2–4)
GLUCOSE SERPL-MCNC: 89 MG/DL (ref 65–100)
GLUCOSE UR STRIP.AUTO-MCNC: NEGATIVE MG/DL
HCT VFR BLD AUTO: 42.9 % (ref 35–47)
HGB BLD-MCNC: 13.5 G/DL (ref 11.5–16)
HGB UR QL STRIP: NEGATIVE
IMM GRANULOCYTES # BLD AUTO: 0 K/UL (ref 0–0.04)
IMM GRANULOCYTES NFR BLD AUTO: 0 % (ref 0–0.5)
KETONES UR QL STRIP.AUTO: NEGATIVE MG/DL
LACTATE BLD-SCNC: 2.2 MMOL/L (ref 0.4–2)
LACTATE SERPL-SCNC: 2.1 MMOL/L (ref 0.4–2)
LEUKOCYTE ESTERASE UR QL STRIP.AUTO: NEGATIVE
LIPASE SERPL-CCNC: 120 U/L (ref 73–393)
LYMPHOCYTES # BLD: 3.1 K/UL (ref 0.8–3.5)
LYMPHOCYTES NFR BLD: 41 % (ref 12–49)
MAGNESIUM SERPL-MCNC: 1.9 MG/DL (ref 1.6–2.4)
MCH RBC QN AUTO: 28.1 PG (ref 26–34)
MCHC RBC AUTO-ENTMCNC: 31.5 G/DL (ref 30–36.5)
MCV RBC AUTO: 89.4 FL (ref 80–99)
MONOCYTES # BLD: 0.6 K/UL (ref 0–1)
MONOCYTES NFR BLD: 8 % (ref 5–13)
NEUTS SEG # BLD: 3.7 K/UL (ref 1.8–8)
NEUTS SEG NFR BLD: 48 % (ref 32–75)
NITRITE UR QL STRIP.AUTO: NEGATIVE
NRBC # BLD: 0 K/UL (ref 0–0.01)
NRBC BLD-RTO: 0 PER 100 WBC
P-R INTERVAL, ECG05: 146 MS
PH UR STRIP: 5 [PH] (ref 5–8)
PLATELET # BLD AUTO: 383 K/UL (ref 150–400)
PMV BLD AUTO: 10.3 FL (ref 8.9–12.9)
POTASSIUM SERPL-SCNC: 3.1 MMOL/L (ref 3.5–5.1)
PROT SERPL-MCNC: 9.4 G/DL (ref 6.4–8.2)
PROT UR STRIP-MCNC: ABNORMAL MG/DL
Q-T INTERVAL, ECG07: 288 MS
QRS DURATION, ECG06: 66 MS
QTC CALCULATION (BEZET), ECG08: 401 MS
RBC # BLD AUTO: 4.8 M/UL (ref 3.8–5.2)
RBC #/AREA URNS HPF: ABNORMAL /HPF (ref 0–5)
SAMPLES BEING HELD,HOLD: NORMAL
SODIUM SERPL-SCNC: 136 MMOL/L (ref 136–145)
SP GR UR REFRACTOMETRY: 1.03 (ref 1–1.03)
TROPONIN I SERPL-MCNC: <0.05 NG/ML
TSH SERPL DL<=0.05 MIU/L-ACNC: 1.82 UIU/ML (ref 0.36–3.74)
UR CULT HOLD, URHOLD: NORMAL
UROBILINOGEN UR QL STRIP.AUTO: 0.2 EU/DL (ref 0.2–1)
VENTRICULAR RATE, ECG03: 117 BPM
WBC # BLD AUTO: 7.7 K/UL (ref 3.6–11)
WBC URNS QL MICRO: ABNORMAL /HPF (ref 0–4)

## 2021-03-03 PROCEDURE — 83690 ASSAY OF LIPASE: CPT

## 2021-03-03 PROCEDURE — 83605 ASSAY OF LACTIC ACID: CPT

## 2021-03-03 PROCEDURE — 99285 EMERGENCY DEPT VISIT HI MDM: CPT

## 2021-03-03 PROCEDURE — 93005 ELECTROCARDIOGRAM TRACING: CPT

## 2021-03-03 PROCEDURE — 80053 COMPREHEN METABOLIC PANEL: CPT

## 2021-03-03 PROCEDURE — 87040 BLOOD CULTURE FOR BACTERIA: CPT

## 2021-03-03 PROCEDURE — 74011000636 HC RX REV CODE- 636: Performed by: RADIOLOGY

## 2021-03-03 PROCEDURE — 83735 ASSAY OF MAGNESIUM: CPT

## 2021-03-03 PROCEDURE — 74177 CT ABD & PELVIS W/CONTRAST: CPT

## 2021-03-03 PROCEDURE — 84484 ASSAY OF TROPONIN QUANT: CPT

## 2021-03-03 PROCEDURE — 99283 EMERGENCY DEPT VISIT LOW MDM: CPT | Performed by: SPECIALIST

## 2021-03-03 PROCEDURE — 85025 COMPLETE CBC W/AUTO DIFF WBC: CPT

## 2021-03-03 PROCEDURE — 83880 ASSAY OF NATRIURETIC PEPTIDE: CPT

## 2021-03-03 PROCEDURE — 81001 URINALYSIS AUTO W/SCOPE: CPT

## 2021-03-03 PROCEDURE — 36415 COLL VENOUS BLD VENIPUNCTURE: CPT

## 2021-03-03 PROCEDURE — 84443 ASSAY THYROID STIM HORMONE: CPT

## 2021-03-03 RX ORDER — ZOLPIDEM TARTRATE 5 MG/1
5 TABLET ORAL
COMMUNITY

## 2021-03-03 RX ORDER — ALBUTEROL SULFATE 90 UG/1
2 AEROSOL, METERED RESPIRATORY (INHALATION)
COMMUNITY

## 2021-03-03 RX ORDER — DOXYCYCLINE HYCLATE 100 MG
100 TABLET ORAL 2 TIMES DAILY
Qty: 14 TAB | Refills: 0 | Status: SHIPPED | OUTPATIENT
Start: 2021-03-03 | End: 2021-03-10

## 2021-03-03 RX ORDER — DILTIAZEM HYDROCHLORIDE 120 MG/1
120 CAPSULE, EXTENDED RELEASE ORAL DAILY
Qty: 30 CAP | Refills: 0 | Status: SHIPPED | OUTPATIENT
Start: 2021-03-03 | End: 2021-04-02

## 2021-03-03 RX ORDER — ZINC SULFATE 50(220)MG
220 CAPSULE ORAL DAILY
COMMUNITY

## 2021-03-03 RX ORDER — CYANOCOBALAMIN 1000 UG/ML
1000 INJECTION, SOLUTION INTRAMUSCULAR; SUBCUTANEOUS
COMMUNITY

## 2021-03-03 RX ADMIN — IOPAMIDOL 100 ML: 755 INJECTION, SOLUTION INTRAVENOUS at 09:40

## 2021-03-03 NOTE — ED NOTES
Patient discharged by provider. D/C instructions given. Pt educated to take r medications as instructed for management at home. Pt verbalized understanding, verbalized no questions. PIV removed, pressure dressing applied. Pt ambulated out of ER without difficulty, NAD.   Patient Vitals for the past 4 hrs:   Pulse Resp BP SpO2   03/03/21 1215 (!) 113 15 136/86 100 %   03/03/21 1130 (!) 112 23 (!) 137/90 99 %   03/03/21 1015 (!) 109 22 (!) 133/95 98 %   03/03/21 1000 (!) 117 28 (!) 138/94 99 %   03/03/21 0900 (!) 121 17 (!) 126/91 99 %

## 2021-03-03 NOTE — ED TRIAGE NOTES
Pt reports she was seen at [facility] on Thursday for shortness of breath and was discharged- Pt saw her PCP on Monday where she had labs done but has not heard back regarding results yet- Patient presents here for ongoing shortness of breath and new abdominal pain that started on Monday.  Pt denies vomiting or diarrhea; only reports nausea

## 2021-03-03 NOTE — LETTER
[facility], [place] 
[facility] EMERGENCY DEPT 
[address] [place] [id number] [phone] Work/School Note Date: 3/3/2021 To Whom It May concern: 
 
Celena Oviedo was seen and treated today in the emergency room by the following provider(s): 
Attending Provider: Yfn Gordon MD.   
 
Celena Oviedo may return to work on 3/8/2021. Sincerely, Marguerite Lundberg

## 2021-03-03 NOTE — ED PROVIDER NOTES
The history is provided by the patient. Shortness of Breath  This is a recurrent problem. The problem occurs continuously. The current episode started more than 1 week ago. The problem has not changed since onset. Associated symptoms include chest pain, abdominal pain and leg swelling. Pertinent negatives include no fever, no headaches, no coryza, no rhinorrhea, no sore throat, no swollen glands, no ear pain, no neck pain, no cough, no sputum production, no hemoptysis, no wheezing, no PND, no orthopnea, no syncope, no vomiting, no rash, no leg pain and no claudication. She has tried nothing for the symptoms. The treatment provided no relief. She has had prior hospitalizations. She has had prior ED visits. She has had no prior ICU admissions. Associated medical issues include asthma, pneumonia and chronic lung disease. Associated medical issues do not include PE, CAD, heart failure, past MI, DVT or recent surgery. Past Medical History:   Diagnosis Date    Arrhythmia     tachycardia  (Zara Tate)    Asthma 2013    inhaler used    Diabetes ([address]     pre diabetic per patient     Dyslipidemia     GERD (gastroesophageal reflux disease)     Hypertension     Obesity 2014    JENNIFER on CPAP     Pneumonia 10/2018    RA (rheumatoid arthritis) ([address]     KNEES    Sepsis ([address] 10/2018       Past Surgical History:   Procedure Laterality Date    ENDOSCOPY, COLON, DIAGNOSTIC      HX  SECTION      x2    HX CHOLECYSTECTOMY  2006    HX HYSTERECTOMY  1997    complete, endometriosis.      IR BRONCHOSCOPY  2018         Family History:   Problem Relation Age of Onset    Other Mother         CAD   Angelique Herbert Hypertension Mother     Hypertension Father     Kidney Disease Father         End Stage Renal, Dialysis    Hypertension Brother     Diabetes Paternal Uncle     Other Maternal Grandmother         CAD    Heart Attack Maternal Grandmother     Cancer Maternal Grandfather         Throat Cancer  Dementia Paternal Grandmother         Alzheimer's Dementia    Cancer Paternal Grandfather         Colon Cancer    Breast Cancer Maternal Aunt 62        mothers 1/2 sister    Diabetes Paternal Uncle     Anesth Problems Neg Hx        Social History     Socioeconomic History    Marital status:      Spouse name: Not on file    Number of children: Not on file    Years of education: Not on file    Highest education level: Not on file   Occupational History    Not on file   Social Needs    Financial resource strain: Not on file    Food insecurity     Worry: Not on file     Inability: Not on file   [employer] needs     Medical: Not on file     Non-medical: Not on file   Tobacco Use    Smoking status: Never Smoker    Smokeless tobacco: Never Used   Substance and Sexual Activity    Alcohol use: No    Drug use: No    Sexual activity: Yes     Partners: Male     Birth control/protection: Surgical   Lifestyle    Physical activity     Days per week: Not on file     Minutes per session: Not on file    Stress: Not on file   Relationships    Social connections     Talks on phone: Not on file     Gets together: Not on file     Attends Christianity service: Not on file     Active member of club or organization: Not on file     Attends meetings of clubs or organizations: Not on file     Relationship status: Not on file    Intimate partner violence     Fear of current or ex partner: Not on file     Emotionally abused: Not on file     Physically abused: Not on file     Forced sexual activity: Not on file   Other Topics Concern    Not on file   Social History Narrative    Not on file         ALLERGIES: Patient has no known allergies. Review of Systems   Constitutional: Negative for activity change, chills and fever. HENT: Negative for ear pain, nosebleeds, rhinorrhea, sore throat, trouble swallowing and voice change. Eyes: Negative for visual disturbance.    Respiratory: Positive for shortness of breath. Negative for cough, hemoptysis, sputum production and wheezing. Cardiovascular: Positive for chest pain and leg swelling. Negative for palpitations, orthopnea, claudication, syncope and PND. Gastrointestinal: Positive for abdominal pain and nausea. Negative for constipation, diarrhea and vomiting. Genitourinary: Negative for difficulty urinating, dysuria, hematuria and urgency. Musculoskeletal: Negative for back pain, neck pain and neck stiffness. Skin: Negative for color change and rash. Allergic/Immunologic: Negative for immunocompromised state. Neurological: Negative for dizziness, seizures, syncope, weakness, light-headedness, numbness and headaches. Psychiatric/Behavioral: Negative for behavioral problems, confusion, hallucinations, self-injury and suicidal ideas. Vitals:    21 0730   BP: (!) 152/95   Pulse: (!) 126   Resp: 16   Temp: 99 °F (37.2 °C)   SpO2: 100%   Weight: 117.8 kg (259 lb 9.6 oz)   Height: 5' (1.524 m)            Physical Exam  Vitals signs and nursing note reviewed. Constitutional:       General: She is not in acute distress. Appearance: She is well-developed. She is not diaphoretic. HENT:      Head: Normocephalic and atraumatic. Eyes:      Pupils: Pupils are equal, round, and reactive to light. Neck:      Musculoskeletal: Normal range of motion and neck supple. Cardiovascular:      Rate and Rhythm: Regular rhythm. Tachycardia present. Heart sounds: Normal heart sounds. No murmur. No friction rub. No gallop. Pulmonary:      Effort: Pulmonary effort is normal. No respiratory distress. Breath sounds: Normal breath sounds. No wheezing. Abdominal:      General: Bowel sounds are normal. There is no distension. Palpations: Abdomen is soft. Tenderness: There is no abdominal tenderness. There is no guarding or rebound. Musculoskeletal: Normal range of motion. Right lower le+ Edema present.       Left lower le+ Edema present. Skin:     General: Skin is warm. Findings: No rash. Neurological:      Mental Status: She is alert and oriented to person, place, and time. Psychiatric:         Behavior: Behavior normal.         Thought Content: Thought content normal.         Judgment: Judgment normal.          MDM     This is a 73-year-old female with past medical history, review of systems, physical exam as above, presenting with complaints of chest pain, shortness of breath, abdominal pain, nausea. Patient with a history of chronic pulmonary changes, tachycardia with normal echocardiogram performed during 2018. She presented to [facility] emergency department last week, for similar symptoms, elevated lactic acid improved with fluid resuscitation, negative cardiac enzymes, mildly tachycardic, CTA of the chest negative. She states she is scheduled follow-up with cardiology at the end of the month, was evaluated by her primary care physician 2 days ago, with lab work pending. She denies fevers or chills, vomiting, cough, diarrhea or dysuria. Physical exam remarkable for well-appearing middle-aged female, in no acute distress noted to be tachycardic, hypertensive, with clear breath sounds, mild diffuse abdominal tenderness, worse over the bilateral lower abdomen, increased rate regular rhythm without murmurs gallops or rubs. She states her weight is improved since increasing her torsemide as instructed during her ED visit last week, however states symptoms ongoing. Suspect her symptoms are due to her tachycardia, which appears to be benign on previous evaluations. Unclear source of previously elevated lactic acid. Plan to repeat cardiac enzymes, EKG, BMP, CBC, CMP, lactic acid and blood cultures, UA. Given her abdominal pain and ongoing symptoms will obtain CT of the abdomen, disposition pending. Procedures    12:27 PM  Lab work without significant abnormality, CT imaging of the belly caught lingual pneumonia. Patient evaluated by cardiology, who recommended increasing diltiazem, treating community-acquired pneumonia. Discussed with patient, and offered admission for tachycardia, elevated lactic acid with pneumonia and she is refusing admission at this time. Will increase diltiazem and provide oral antibiotics, recommend primary care follow-up, cardiology follow-up as scheduled, return precautions given.

## 2021-03-03 NOTE — CONSULTS
CARDIOLOGY CONSULTATION NOTE    Maximus Ferrell MD,  [address], [place], [address] [facility]  Phone [phone]; Fax [phone]  Mobile [phone]   Voice Mail [phone]                               3/3/2021  7:24 AM  Leslie Howe MD  :  1965   MRN:  [id number]     CC: SOB    Reason for consult: Chest pain      Admission Diagnosis: No admission diagnoses are documented for this encounter. ATTENTION:   This medical record was transcribed using an electronic medical records/speech recognition system. Although proofread, it may and can contain electronic, spelling and other errors. Corrections may be executed at a later time. Please feel free to contact us for any clarifications as needed. Impression Plan/Recommendation   1. Chest pain  2. Sinus tachycardia               1. Trops negx1, Pro-BNP 18. Echo wnl 2018. EKG sinus tach. Suspect CP likely 2/2 acute LLL PNA. Abx treatment per ED   2. BL HR 80s-90s per pt, currently ~110-120bpm. Treatment of PNA as per ED. Consider increasing dilt dose to 300mg and f/up w/ Dr. Daly Fong next wk. May consider resuming dilt dose to 240 once acute illness resolves. Taylor Sifuentes is a 50 y.o. female I am seeing for chest pain, sinus tach. Pt seen at [facility] ED  for similar complaints; had no acute changes on CTA chest, CXR. Pt was sent home w/ increased torsemide dose for worsening LE edema. Since then, pt reports improvement in LE edema but persistent CP. The CP has gone down from a 10/10 last wk to 4/10 today. Pain is in the center of her chest, pressure, non-radiating. No orthopnea/PND. States HR peaked to 140s at home (80-90 at [facility]). Pt notes hx recurrent PNA needing hospitalizations since 2016, often associated w/ CP. No fevers/chills/cough. No syncope. Other than increased torsemide dose, no new med changes. Had nl echo in 2018.     Pt has upcoming appt w/ cardiology (Dr. Daly Fong) at the end of this mo (no f/up since )       Cardiac risk factors: dyslipidemia, obesity, sedentary life style, hypertension. No Known Allergies      Past Medical History:   Diagnosis Date    Arrhythmia     tachycardia  (Zara Gilman)    Asthma 2013    inhaler used    [address]     pre diabetic per patient     Dyslipidemia     GERD (gastroesophageal reflux disease)     Hypertension     Obesity 2014    JENNIFER on CPAP     Pneumonia 10/2018    RA (rheumatoid arthritis) ([facility]     KNEES    Sepsis ([facility] 10/2018        Past Surgical History:   Procedure Laterality Date    ENDOSCOPY, COLON, DIAGNOSTIC      HX  SECTION      x2    HX CHOLECYSTECTOMY      HX HYSTERECTOMY      complete, endometriosis.  IR BRONCHOSCOPY  2018        . Home Medications:  Prior to Admission Medications   Prescriptions Last Dose Informant Patient Reported? Taking? DILT- mg XR capsule  Self Yes No   Sig: TAKE 1 CAPSULE BY MOUTH EVERY DAY ON EMPTY STOMACH IN THE MORNING   albuterol (PROVENTIL HFA, VENTOLIN HFA) 90 mcg/actuation inhaler  Self No No   Sig: Take 2 Puffs by inhalation every six (6) hours as needed for Wheezing. albuterol-ipratropium (DUO-NEB) 2.5 mg-0.5 mg/3 ml nebu  Self Yes No   Sig: 3 mL by Nebulization route every six (6) hours as needed for Wheezing. aspirin 81 mg chewable tablet  Self Yes No   Sig: Take 81 mg by mouth daily. cloNIDine HCL (CATAPRES) 0.1 mg tablet  Self Yes No   Sig: Take 0.1 mg by mouth nightly. dulaglutide (TRULICITY) 1.5 CE/3.6 mL sub-q pen  Self Yes No   Si.5 mg by SubCUTAneous route every Tuesday. enalapril (VASOTEC) 5 mg tablet  Self Yes No   Sig: Take 5 mg by mouth daily. ergocalciferol (ERGOCALCIFEROL) 1,250 mcg (50,000 unit) capsule  Self Yes No   Sig: Take 50,000 Units by mouth every Monday. famotidine (PEPCID) 20 mg tablet  Self Yes No   Sig: Take 20 mg by mouth daily.    levoFLOXacin (LEVAQUIN) 750 mg tablet  Self Yes No   Sig: Take 750 mg by mouth daily. olopatadine (PATADAY) 0.2 % drop ophthalmic solution  Self Yes No   Sig: Administer 1 Drop to both eyes daily. ondansetron (ZOFRAN ODT) 4 mg disintegrating tablet   No No   Sig: Take 1 Tab by mouth every eight (8) hours as needed for Nausea or Vomiting. therapeutic multivitamin [facility]) tablet  Self Yes No   Sig: Take 1 Tab by mouth daily. torsemide (DEMADEX) 20 mg tablet  Self Yes No   Sig: Take 20 mg by mouth two (2) times a day. Facility-Administered Medications: None       Hospital Medications:  No current facility-administered medications for this encounter. Current Outpatient Medications   Medication Sig    albuterol-ipratropium (DUO-NEB) 2.5 mg-0.5 mg/3 ml nebu 3 mL by Nebulization route every six (6) hours as needed for Wheezing.  cloNIDine HCL (CATAPRES) 0.1 mg tablet Take 0.1 mg by mouth nightly.  ergocalciferol (ERGOCALCIFEROL) 1,250 mcg (50,000 unit) capsule Take 50,000 Units by mouth every Monday.  dulaglutide (TRULICITY) 1.5 NE/5.8 mL sub-q pen 1.5 mg by SubCUTAneous route every Tuesday.  olopatadine (PATADAY) 0.2 % drop ophthalmic solution Administer 1 Drop to both eyes daily.  famotidine (PEPCID) 20 mg tablet Take 20 mg by mouth daily.  torsemide (DEMADEX) 20 mg tablet Take 20 mg by mouth two (2) times a day.  levoFLOXacin (LEVAQUIN) 750 mg tablet Take 750 mg by mouth daily.  ondansetron (ZOFRAN ODT) 4 mg disintegrating tablet Take 1 Tab by mouth every eight (8) hours as needed for Nausea or Vomiting.  aspirin 81 mg chewable tablet Take 81 mg by mouth daily.  DILT- mg XR capsule TAKE 1 CAPSULE BY MOUTH EVERY DAY ON EMPTY STOMACH IN THE MORNING    enalapril (VASOTEC) 5 mg tablet Take 5 mg by mouth daily.  therapeutic multivitamin (THERAGRAN) tablet Take 1 Tab by mouth daily.  albuterol (PROVENTIL HFA, VENTOLIN HFA) 90 mcg/actuation inhaler Take 2 Puffs by inhalation every six (6) hours as needed for Wheezing. OBJECTIVE       Laboratory and Imaging have been reviewed and are notable for      ECG:  Date:  unchanged from previous tracings, sinus tachycardia      Diagnostic Tests:     Recent Labs     03/03/21  0746   TROIQ <0.05     Recent Labs     03/03/21  0746      K 3.1*   CO2 24   BUN 18   CREA 1.39*   GLU 89   MG 1.9   WBC 7.7   HGB 13.5   HCT 42.9            Cardiac work up to date:  No specialty comments available. Social History:  Social History     Tobacco Use    Smoking status: Never Smoker    Smokeless tobacco: Never Used   Substance Use Topics    Alcohol use: No       Family History:  Family History   Problem Relation Age of Onset    Other Mother         CAD    Hypertension Mother     Hypertension Father     Kidney Disease Father         End Stage Renal, Dialysis    Hypertension Brother     Diabetes Paternal Uncle     Other Maternal Grandmother         CAD    Heart Attack Maternal Grandmother     Cancer Maternal Grandfather         Throat Cancer    Dementia Paternal Grandmother         Alzheimer's Dementia    Cancer Paternal Grandfather         Colon Cancer    Breast Cancer Maternal Aunt 62        mothers 1/2 sister    Diabetes Paternal Uncle     Anesth Problems Neg Hx        Review of Symptoms:  A comprehensive review of systems was negative except for that written in the HPI. Physical Exam:      Visit Vitals  /86   Pulse (!) 113   Temp 99 °F (37.2 °C)   Resp 15   Ht 5' (1.524 m)   Wt 259 lb 9.6 oz (117.8 kg)   SpO2 100%   BMI 50.70 kg/m²     General Appearance:  Well developed, well nourished,alert and oriented x 3, and individual in no acute distress.    Ears/Nose/Mouth/Throat:   Hearing grossly normal.Normal oral mucosa,no scleral icterus     Neck: Supple no JVD or bruits,no cervical lymphadenopathy   Chest:   Lungs clear to auscultation bilaterally,no rales rhonchi or wheezing   Cardiovascular:  +tachycardia, regular rhythm, S1, S2 normal, Murmur. PMI nondisplaced   Abdomen:   Soft, non-tender, bowel sounds are active. No abdominal bruits   Extremities: Trace edema bilaterally. Pulses detected, no varicosities   Skin: Warm and dry. No bruising  Neuro  Moves all extermities and neurologically intact                                                       I have discussed the diagnosis with the patient and the intended plan as seen in the above orders. Questions were answered concerning future plans. I have discussed medication side effects and warnings with the patient as well. Artur Bynum is in agreement to the plan listed above and wishes to proceed. she  was instructed not to smoke, eat heart healthy diet  and to exercise. Thank you for the consult and the opportunity to take care of  Ms. Montanez    Pt seen and discussed w/ Dr. Faye Fonseca, Cardiology Attending    Monalisa Nguyen MD  Family Medicine Resident

## 2021-03-08 LAB
BACTERIA SPEC CULT: NORMAL
SERVICE CMNT-IMP: NORMAL

## 2021-10-28 ENCOUNTER — DOCUMENTATION ONLY (OUTPATIENT)
Dept: CARDIOLOGY CLINIC | Age: 56
End: 2021-10-28

## 2021-10-28 NOTE — PROGRESS NOTES
Fax received from [address] for Echo orders Dx: SOB, pHTN. \"Please evaluate PASP. \"  Ordered by Jocy Leslie PA-C, Dr. Jewel Kate.

## 2021-11-01 ENCOUNTER — TRANSCRIBE ORDER (OUTPATIENT)
Dept: SCHEDULING | Age: 56
End: 2021-11-01

## 2021-11-01 DIAGNOSIS — R93.89 ABNORMAL CHEST X-RAY: Primary | ICD-10-CM

## 2021-11-02 ENCOUNTER — HOSPITAL ENCOUNTER (OUTPATIENT)
Dept: CT IMAGING | Age: 56
Discharge: HOME | End: 2021-11-02
Attending: INTERNAL MEDICINE
Payer: COMMERCIAL

## 2021-11-02 DIAGNOSIS — R93.89 ABNORMAL CHEST X-RAY: ICD-10-CM

## 2021-11-02 PROCEDURE — 71250 CT THORAX DX C-: CPT

## 2021-11-17 ENCOUNTER — TRANSCRIBE ORDER (OUTPATIENT)
Dept: SCHEDULING | Age: 56
End: 2021-11-17

## 2021-11-17 DIAGNOSIS — R06.02 SHORTNESS OF BREATH: Primary | ICD-10-CM

## 2021-11-18 ENCOUNTER — HOSPITAL ENCOUNTER (OUTPATIENT)
Dept: NON INVASIVE DIAGNOSTICS | Age: 56
Discharge: HOME | End: 2021-11-18
Attending: PHYSICIAN ASSISTANT
Payer: COMMERCIAL

## 2021-11-18 DIAGNOSIS — R06.02 SHORTNESS OF BREATH: ICD-10-CM

## 2021-11-18 LAB
ECHO AO ROOT DIAM: 1.82 CM
ECHO AV AREA PLAN: 1.63 CM2
ECHO LA AREA 4C: 18.72 CM2
ECHO LA MAJOR AXIS: 3.96 CM
ECHO LA VOL 4C: 51.41 ML (ref 22–52)
ECHO LV EDV A4C: 100.94 ML
ECHO LV EJECTION FRACTION A4C: 78 PERCENT
ECHO LV ESV A4C: 21.75 ML
ECHO LV INTERNAL DIMENSION DIASTOLIC: 4.88 CM (ref 3.9–5.3)
ECHO LV INTERNAL DIMENSION SYSTOLIC: 3.39 CM
ECHO LV IVSD: 0.88 CM (ref 0.6–0.9)
ECHO LV MASS 2D: 196.7 G (ref 67–162)
ECHO LV POSTERIOR WALL DIASTOLIC: 1.3 CM (ref 0.6–0.9)
ECHO LVOT DIAM: 1.55 CM
ECHO LVOT PEAK GRADIENT: 3.9 MMHG
ECHO LVOT PEAK VELOCITY: 98.76 CM/S
ECHO MV A VELOCITY: 47.67 CM/S
ECHO MV AREA PHT: 6.68 CM2
ECHO MV AREA PHT: 7.57 CM2
ECHO MV AREA PLAN: 4.71 CM2
ECHO MV E DECELERATION TIME (DT): 100.16 MS
ECHO MV E VELOCITY: 43.11 CM/S
ECHO MV E/A RATIO: 0.9
ECHO MV MAX VELOCITY: 95.49 CM/S
ECHO MV MEAN GRADIENT: 2.02 MMHG
ECHO MV PEAK GRADIENT: 3.65 MMHG
ECHO MV PRESSURE HALF TIME (PHT): 29.05 MS
ECHO MV PRESSURE HALF TIME (PHT): 32.94 MS
ECHO MV VTI: 22.22 CM
ECHO PV PEAK INSTANTANEOUS GRADIENT SYSTOLIC: 6.38 MMHG
ECHO RA AREA 4C: 13.42 CM2
ECHO TV REGURGITANT MAX VELOCITY: 589.88 CM/S

## 2021-11-18 PROCEDURE — C8929 TTE W OR WO FOL WCON,DOPPLER: HCPCS

## 2021-11-18 PROCEDURE — 74011250636 HC RX REV CODE- 250/636: Performed by: PHYSICIAN ASSISTANT

## 2021-11-18 PROCEDURE — 93306 TTE W/DOPPLER COMPLETE: CPT | Performed by: SPECIALIST

## 2021-11-18 RX ORDER — SODIUM CHLORIDE 0.9 % (FLUSH) 0.9 %
10 SYRINGE (ML) INJECTION AS NEEDED
Status: DISCONTINUED | OUTPATIENT
Start: 2021-11-18 | End: 2021-11-22 | Stop reason: HOSPADM

## 2021-11-18 RX ADMIN — Medication 10 ML: at 10:33

## 2021-11-18 RX ADMIN — PERFLUTREN 2 ML: 6.52 INJECTION, SUSPENSION INTRAVENOUS at 10:33

## 2021-11-18 RX ADMIN — Medication 10 ML: at 10:45

## 2021-12-31 ENCOUNTER — TRANSCRIBE ORDER (OUTPATIENT)
Dept: EMERGENCY DEPT | Age: 56
End: 2021-12-31

## 2021-12-31 ENCOUNTER — HOSPITAL ENCOUNTER (OUTPATIENT)
Dept: GENERAL RADIOLOGY | Age: 56
Discharge: HOME | End: 2021-12-31
Payer: COMMERCIAL

## 2021-12-31 DIAGNOSIS — J69.1: ICD-10-CM

## 2021-12-31 DIAGNOSIS — J69.1: Primary | ICD-10-CM

## 2021-12-31 PROCEDURE — 71046 X-RAY EXAM CHEST 2 VIEWS: CPT

## 2022-01-06 NOTE — PERIOP NOTES
No name stated on voicemail; Left generalized message regarding COVID requirements, a COVID test needs to be completed in order to proceed with procedures at [facility]. Left message regarding curbside COVID swabbing at [facility] Monday, January 10th from [id number]. Call [facility] at [phone] Monday thru Friday with questions or concerns.

## 2022-01-07 NOTE — PERIOP NOTES
No name stated on voicemail; Left generalized message regarding COVID requirements, a COVID test needs to be completed in order to proceed with procedures at [facility]. Left message regarding curbside COVID swabbing at [facility] Monday, January 10th from [phone].  Call [facility] at [phone] Monday thru Friday with questions or concerns.

## 2022-01-07 NOTE — PERIOP NOTES
Informed patient of COVID requirements, patient to complete COVID curbside testing at [facility] Monday, January 10th between [id number]. Patient verbalized understanding that COVID test is required to proceed with procedure.

## 2022-01-10 ENCOUNTER — HOSPITAL ENCOUNTER (OUTPATIENT)
Dept: LAB | Age: 57
Discharge: HOME | End: 2022-01-10
Payer: COMMERCIAL

## 2022-01-10 ENCOUNTER — TRANSCRIBE ORDER (OUTPATIENT)
Dept: REGISTRATION | Age: 57
End: 2022-01-10

## 2022-01-10 DIAGNOSIS — Z20.822 ENCOUNTER FOR PREOPERATIVE SCREENING LABORATORY TESTING FOR COVID-19 VIRUS: ICD-10-CM

## 2022-01-10 DIAGNOSIS — Z20.822 ENCOUNTER FOR PREOPERATIVE SCREENING LABORATORY TESTING FOR COVID-19 VIRUS: Primary | ICD-10-CM

## 2022-01-10 DIAGNOSIS — Z01.812 ENCOUNTER FOR PREOPERATIVE SCREENING LABORATORY TESTING FOR COVID-19 VIRUS: Primary | ICD-10-CM

## 2022-01-10 DIAGNOSIS — Z01.812 ENCOUNTER FOR PREOPERATIVE SCREENING LABORATORY TESTING FOR COVID-19 VIRUS: ICD-10-CM

## 2022-01-10 PROCEDURE — U0005 INFEC AGEN DETEC AMPLI PROBE: HCPCS

## 2022-01-10 NOTE — PERIOP NOTES
[address]  Endoscopy Preprocedure Instructions      1. On the day of your surgery, please report to registration located on the 2nd floor of the  [facility]. yes    2. You must have a responsible adult to drive you to the hospital, stay at the hospital during your procedure and drive you home. If they leave your procedure will not be started (no exceptions). yes    3. Do not have anything to eat or drink (including water, gum, mints, coffee, and juice) after midnight. This does not apply to the medications you were instructed to take by your physician. yes  If you are currently taking Plavix, Coumadin, Aspirin, or other blood-thinning agents, contact your physician for special instructions. not applicable,    4. If you are having a procedure that requires bowel prep: We recommend that you have only clear liquids the day before your procedure and begin your bowel prep by 5:00 pm.  You may continue to drink clear liquids until midnight. If for any reason you are not able to complete your prep please notify your physician immediately. yes    5. Have a list of all current medications, including vitamins, herbal supplements and any other over the counter medications. yes    6. If you wear glasses, contacts, dentures and/or hearing aids, they may be removed prior to procedure, please bring a case to store them in. yes    7. You should understand that if you do not follow these instructions your procedure may be cancelled. If your physical condition changes (I.e. fever, cold or flu) please contact your doctor as soon as possible. 8. It is important that you be on time. If for any reason you are unable to keep your appointment please call [phone] the day of or your physicians office prior to your scheduled procedure    9.  Have you received your COVID Vaccine? not applicable If no, you will need to receive a COVID test/swab here at [facility] the MOB parking lot Monday - Friday 8a - 11am. There are no Saturday or Sunday swabbing at any [facility]. (patient verbalizes understanding) yes

## 2022-01-11 LAB
SARS-COV-2, XPLCVT: NOT DETECTED
SOURCE, COVRS: NORMAL

## 2022-01-13 ENCOUNTER — ANESTHESIA (OUTPATIENT)
Dept: ENDOSCOPY | Age: 57
End: 2022-01-13
Payer: COMMERCIAL

## 2022-01-13 ENCOUNTER — HOSPITAL ENCOUNTER (OUTPATIENT)
Age: 57
Setting detail: OUTPATIENT SURGERY
Discharge: HOME | End: 2022-01-13
Attending: INTERNAL MEDICINE | Admitting: INTERNAL MEDICINE
Payer: COMMERCIAL

## 2022-01-13 ENCOUNTER — ANESTHESIA EVENT (OUTPATIENT)
Dept: ENDOSCOPY | Age: 57
End: 2022-01-13
Payer: COMMERCIAL

## 2022-01-13 VITALS
OXYGEN SATURATION: 97 % | WEIGHT: 291.01 LBS | HEIGHT: 60 IN | DIASTOLIC BLOOD PRESSURE: 84 MMHG | RESPIRATION RATE: 28 BRPM | SYSTOLIC BLOOD PRESSURE: 130 MMHG | TEMPERATURE: 98 F | HEART RATE: 93 BPM | BODY MASS INDEX: 57.13 KG/M2

## 2022-01-13 LAB
GLUCOSE BLD STRIP.AUTO-MCNC: 96 MG/DL (ref 65–117)
SERVICE CMNT-IMP: NORMAL

## 2022-01-13 PROCEDURE — 82962 GLUCOSE BLOOD TEST: CPT

## 2022-01-13 PROCEDURE — 74011000250 HC RX REV CODE- 250: Performed by: NURSE ANESTHETIST, CERTIFIED REGISTERED

## 2022-01-13 PROCEDURE — 2709999900 HC NON-CHARGEABLE SUPPLY: Performed by: INTERNAL MEDICINE

## 2022-01-13 PROCEDURE — 74011250636 HC RX REV CODE- 250/636: Performed by: NURSE ANESTHETIST, CERTIFIED REGISTERED

## 2022-01-13 PROCEDURE — 76040000019: Performed by: INTERNAL MEDICINE

## 2022-01-13 PROCEDURE — 76060000031 HC ANESTHESIA FIRST 0.5 HR: Performed by: INTERNAL MEDICINE

## 2022-01-13 RX ORDER — PROPOFOL 10 MG/ML
INJECTION, EMULSION INTRAVENOUS
Status: DISCONTINUED | OUTPATIENT
Start: 2022-01-13 | End: 2022-01-13 | Stop reason: HOSPADM

## 2022-01-13 RX ORDER — ATROPINE SULFATE 0.1 MG/ML
0.4 INJECTION INTRAVENOUS
Status: DISCONTINUED | OUTPATIENT
Start: 2022-01-13 | End: 2022-01-13 | Stop reason: HOSPADM

## 2022-01-13 RX ORDER — FLUMAZENIL 0.1 MG/ML
0.2 INJECTION INTRAVENOUS
Status: DISCONTINUED | OUTPATIENT
Start: 2022-01-13 | End: 2022-01-13 | Stop reason: HOSPADM

## 2022-01-13 RX ORDER — DEXTROMETHORPHAN/PSEUDOEPHED 2.5-7.5/.8
1.2 DROPS ORAL
Status: DISCONTINUED | OUTPATIENT
Start: 2022-01-13 | End: 2022-01-13 | Stop reason: HOSPADM

## 2022-01-13 RX ORDER — SODIUM CHLORIDE 9 MG/ML
INJECTION, SOLUTION INTRAVENOUS
Status: DISCONTINUED | OUTPATIENT
Start: 2022-01-13 | End: 2022-01-13 | Stop reason: HOSPADM

## 2022-01-13 RX ORDER — LIDOCAINE HYDROCHLORIDE 20 MG/ML
INJECTION, SOLUTION EPIDURAL; INFILTRATION; INTRACAUDAL; PERINEURAL AS NEEDED
Status: DISCONTINUED | OUTPATIENT
Start: 2022-01-13 | End: 2022-01-13 | Stop reason: HOSPADM

## 2022-01-13 RX ORDER — MIDAZOLAM HYDROCHLORIDE 1 MG/ML
INJECTION, SOLUTION INTRAMUSCULAR; INTRAVENOUS AS NEEDED
Status: DISCONTINUED | OUTPATIENT
Start: 2022-01-13 | End: 2022-01-13 | Stop reason: HOSPADM

## 2022-01-13 RX ORDER — NALOXONE HYDROCHLORIDE 0.4 MG/ML
0.4 INJECTION, SOLUTION INTRAMUSCULAR; INTRAVENOUS; SUBCUTANEOUS
Status: DISCONTINUED | OUTPATIENT
Start: 2022-01-13 | End: 2022-01-13 | Stop reason: HOSPADM

## 2022-01-13 RX ORDER — MIDAZOLAM HYDROCHLORIDE 1 MG/ML
.25-5 INJECTION, SOLUTION INTRAMUSCULAR; INTRAVENOUS
Status: DISCONTINUED | OUTPATIENT
Start: 2022-01-13 | End: 2022-01-13 | Stop reason: HOSPADM

## 2022-01-13 RX ORDER — EPINEPHRINE 0.1 MG/ML
1 INJECTION INTRACARDIAC; INTRAVENOUS
Status: DISCONTINUED | OUTPATIENT
Start: 2022-01-13 | End: 2022-01-13 | Stop reason: HOSPADM

## 2022-01-13 RX ORDER — SODIUM CHLORIDE 9 MG/ML
50 INJECTION, SOLUTION INTRAVENOUS CONTINUOUS
Status: DISCONTINUED | OUTPATIENT
Start: 2022-01-13 | End: 2022-01-13 | Stop reason: HOSPADM

## 2022-01-13 RX ORDER — PROPOFOL 10 MG/ML
INJECTION, EMULSION INTRAVENOUS AS NEEDED
Status: DISCONTINUED | OUTPATIENT
Start: 2022-01-13 | End: 2022-01-13 | Stop reason: HOSPADM

## 2022-01-13 RX ADMIN — PROPOFOL 20 MG: 10 INJECTION, EMULSION INTRAVENOUS at 10:06

## 2022-01-13 RX ADMIN — PROPOFOL 30 MG: 10 INJECTION, EMULSION INTRAVENOUS at 10:02

## 2022-01-13 RX ADMIN — PROPOFOL INJECTABLE EMULSION 100 MCG/KG/MIN: 10 INJECTION, EMULSION INTRAVENOUS at 09:59

## 2022-01-13 RX ADMIN — SODIUM CHLORIDE: 9 INJECTION, SOLUTION INTRAVENOUS at 09:30

## 2022-01-13 RX ADMIN — LIDOCAINE HYDROCHLORIDE 80 MG: 20 INJECTION, SOLUTION INTRAVENOUS at 10:02

## 2022-01-13 RX ADMIN — MIDAZOLAM 2 MG: 1 INJECTION, SOLUTION INTRAMUSCULAR; INTRAVENOUS at 09:58

## 2022-01-13 NOTE — DISCHARGE INSTRUCTIONS
[address] [facility]. Kira Rizo M.D.  [phone]          COLON DISCHARGE INSTRUCTIONS       2022    Karina Loza  :  1965  [facility] Medical Record Number:  [id number]      COLONOSCOPY FINDINGS:  Your colonoscopy showed: Diverticulosis and internal hemorrhoids. DISCOMFORT:  Redness at IV site- apply warm compress to area; if redness or soreness persist- contact your physician  There may be a slight amount of blood passed from the rectum  Gaseous discomfort- walking, belching will help relieve any discomfort  You may not operate a vehicle for 12 hours  You may not engage in an occupation involving machinery or appliances for rest of today  You may not drink alcoholic beverages for at least 12 hours  Avoid making any critical decisions for at least 24 hour  DIET:   High fiber diet. - however -  remember your colon is empty and a heavy meal will produce gas. Avoid these foods:  vegetables, fried / greasy foods, carbonated drinks for today     ACTIVITY:  You may resume your normal daily activities it is recommended that you spend the remainder of the day resting -  avoid any strenuous activity. CALL M.D. ANY SIGN OF:   Increasing pain, nausea, vomiting  Abdominal distension (swelling)  New increased bleeding (oral or rectal)  Fever (chills)  Pain in chest area  Bloody discharge from nose or mouth   Shortness of breath    Follow-up Instructions:   Call Dr. Eunice Ball if any questions or problems. Telephone # [phone]  Biopsy results will be available in  5 to 7 days  Should have a repeat colonoscopy in 10 years.    -You will be set up for banding of your hemorrhoids.  If you have not heard from the office in a weeks time please call and have this set up

## 2022-01-13 NOTE — H&P
Bette Jovel M.D.  [phone]            History and Physical       NAME:  Delmi Beck   :   1965   MRN:   [id number]       Referring Physician:  Santos Barney MD      Consult Date: 2022 8:46 AM    Chief Complaint:  Rectal bleeding    History of Present Illness:  52yo female with DM, is seen in clinic for rectal bleeding x6 weeks, getting worse. Last bleeding was 2 days ago and bleeding is in underwear and has to wear a pad. Pt reports her father had colon polyps. She has some pain at times, some discomfort with having a BM. Has some alternating diarrhea and constipation which is new but pt says she thought this was from stress related to her mother's recent death. +nausea at times. Denies other upper gi sx, abd pain, weight loss, or any other complaints. Last colonoscopy in  with diverticulosis and internal hemorrhoids. No blood thinners. Takes ibuprofen 1-2 times daily. No family hx colon cancer, father had polyps. PMH:  Past Medical History:   Diagnosis Date    Arrhythmia     tachycardia  (Zara Herron)    Asthma 2013    inhaler used    Diabetes ([facility] [place] [address]     pre diabetic per patient     Dyslipidemia     GERD (gastroesophageal reflux disease)     Hypertension     Obesity 2014    JENNIFER on CPAP     Pneumonia 10/2018    RA (rheumatoid arthritis) ([facility] [place] [address]     KNEES    Sepsis ([facility] [place] [address] 10/2018       PSH:  Past Surgical History:   Procedure Laterality Date    ENDOSCOPY, COLON, DIAGNOSTIC      HX  SECTION      x2    HX CHOLECYSTECTOMY  2006    HX HYSTERECTOMY  1997    complete, endometriosis.  IR BRONCHOSCOPY  2018       Allergies:  No Known Allergies    Home Medications:  Prior to Admission Medications   Prescriptions Last Dose Informant Patient Reported? Taking?    DILT- mg XR capsule 2022 at Unknown time Self Yes Yes   Sig: TAKE 1 CAPSULE BY MOUTH EVERY DAY ON EMPTY STOMACH IN THE MORNING   albuterol (PROVENTIL HFA, VENTOLIN HFA, PROAIR HFA) 90 mcg/actuation inhaler 2022 at Unknown time Self Yes Yes   Sig: Take 2 Puffs by inhalation every four (4) hours as needed for Wheezing. albuterol-ipratropium (DUO-NEB) 2.5 mg-0.5 mg/3 ml nebu 2022 at Unknown time Self Yes Yes   Sig: 3 mL by Nebulization route every four (4) hours as needed for Wheezing. ascorbic acid (VITAMIN C PO) 2022 at Unknown time Self Yes Yes   Sig: Take 1 Cap by mouth. aspirin 81 mg chewable tablet 2022 at Unknown time Self Yes Yes   Sig: Take 81 mg by mouth daily. cloNIDine HCL (CATAPRES) 0.1 mg tablet 2022 at Unknown time Self Yes Yes   Sig: Take 0.1 mg by mouth nightly. cyanocobalamin (VITAMIN B12) 1,000 mcg/mL injection 2022 at Unknown time Self Yes Yes   Si,000 mcg by IntraMUSCular route every thirty (30) days. Administered by MD's office   dulaglutide (TRULICITY) 1.5 WX/9.7 mL sub-q pen 2022 Self Yes Yes   Si.5 mg by SubCUTAneous route every Tuesday. enalapril (VASOTEC) 5 mg tablet 2022 at Unknown time Self Yes Yes   Sig: Take 5 mg by mouth daily. linaCLOtide (Linzess) 145 mcg cap capsule 2022 at Unknown time Self Yes Yes   Sig: Take 145 mcg by mouth every evening. olopatadine (PATADAY) 0.2 % drop ophthalmic solution 2022 at Unknown time Self Yes Yes   Sig: Administer 1 Drop to both eyes daily. therapeutic multivitamin (THERAGRAN) tablet 2022 at Unknown time Self Yes Yes   Sig: Take 1 Tab by mouth daily. torsemide (DEMADEX) 20 mg tablet 2022 at Unknown time Self Yes Yes   Sig: Take 20 mg by mouth two (2) times a day. zinc sulfate (Zinc-220) 220 (50) mg capsule 2022 at Unknown time Self Yes Yes   Sig: Take 220 mg by mouth daily. zolpidem (AMBIEN) 5 mg tablet Not Taking at Unknown time Self Yes No   Sig: Take 5 mg by mouth nightly as needed for Sleep.    Patient not taking: Reported on 1/10/2022      Facility-Administered Medications: None       Hospital Medications:  No current facility-administered medications for this encounter. Social History:  Social History     Tobacco Use    Smoking status: Never Smoker    Smokeless tobacco: Never Used   Substance Use Topics    Alcohol use: No       Family History:  Family History   Problem Relation Age of Onset    Other Mother         CAD    Hypertension Mother     Hypertension Father     Kidney Disease Father         End Stage Renal, Dialysis    Hypertension Brother     Diabetes Paternal Uncle     Other Maternal Grandmother         CAD    Heart Attack Maternal Grandmother     Cancer Maternal Grandfather         Throat Cancer    Dementia Paternal Grandmother         Alzheimer's Dementia    Cancer Paternal Grandfather         Colon Cancer    Breast Cancer Maternal Aunt 62        mothers 1/2 sister    Diabetes Paternal Uncle     Anesth Problems Neg Hx              Review of Systems:      Constitutional: negative fever, negative chills, negative weight loss  Eyes:   negative visual changes  ENT:   negative sore throat, tongue or lip swelling  Respiratory:  negative cough, negative dyspnea  Cards:  negative for chest pain, palpitations, lower extremity edema  GI:   See HPI  :  negative for frequency, dysuria  Integument:  negative for rash and pruritus  Heme:  negative for easy bruising and gum/nose bleeding  Musculoskel: negative for myalgias,  back pain and muscle weakness  Neuro: negative for headaches, dizziness, vertigo  Psych:  negative for feelings of anxiety, depression       Objective:     Patient Vitals for the past 8 hrs:   BP Temp Pulse Resp SpO2 Height Weight   01/13/22 0839 (!) 147/85 98.2 °F (36.8 °C) (!) 102 25 97 % 5' (1.524 m) 132 kg (291 lb 0.1 oz)     No intake/output data recorded. No intake/output data recorded.     EXAM:     NEURO-a&o   HEENT-wnl   LUNGS-clear    COR-regular rate and rhythym     ABD-soft , no tenderness, no rebound, good bs     EXT-no edema     Data Review     No results for input(s): WBC, HGB, HCT, PLT, HGBEXT, HCTEXT, PLTEXT in the last 72 hours. No results for input(s): NA, K, CL, CO2, BUN, CREA, GLU, PHOS, CA in the last 72 hours. No results for input(s): AP, TBIL, TP, ALB, GLOB, GGT, AML, LPSE in the last 72 hours. No lab exists for component: SGOT, GPT, AMYP, HLPSE  No results for input(s): INR, PTP, APTT, INREXT in the last 72 hours. Patient Active Problem List   Diagnosis Code    Asthma O03.637    Diastolic murmur S85    Obesity E66.9    Multifocal pneumonia J18.9    Asthma with exacerbation J45. 0    Hypertension I10    Systemic inflammatory response syndrome (SIRS) ([facility]) R65.10    ILD (interstitial lung disease) ([facility]) J84.9    Fever R50.9    Type 2 diabetes mellitus, without long-term current use of insulin ([facility]) E11.9    JENNIFER on CPAP G47.33, Z99.89    Sinus tachycardia R00.0      Assessment:   · Rectal bleeding   Plan:   · Colonoscopy today.      Signed By: Alex Ponce MD     1/13/2022  8:46 AM

## 2022-01-13 NOTE — ANESTHESIA PREPROCEDURE EVALUATION
Relevant Problems   RESPIRATORY SYSTEM   (+) Asthma   (+) Asthma with exacerbation   (+) Multifocal pneumonia   (+) JENNIFER on CPAP      CARDIOVASCULAR   (+) Diastolic murmur   (+) Hypertension      ENDOCRINE   (+) Obesity   (+) Type 2 diabetes mellitus, without long-term current use of insulin (HCC)       Anesthetic History     Increased risk of difficult airway          Review of Systems / Medical History  Patient summary reviewed, nursing notes reviewed and pertinent labs reviewed    Pulmonary        Sleep apnea    Asthma        Neuro/Psych   Within defined limits           Cardiovascular    Hypertension        Dysrhythmias : sinus tachycardia      Exercise tolerance: >4 METS     GI/Hepatic/Renal     GERD           Endo/Other    Diabetes    Morbid obesity and arthritis     Other Findings              Physical Exam    Airway  Mallampati: III    Neck ROM: normal range of motion   Mouth opening: Diminished (comment)     Cardiovascular  Regular rate and rhythm,  S1 and S2 normal,  no murmur, click, rub, or gallop  Rhythm: regular  Rate: normal         Dental  No notable dental hx       Pulmonary  Breath sounds clear to auscultation               Abdominal  GI exam deferred       Other Findings            Anesthetic Plan    ASA: 3  Anesthesia type: MAC          Induction: Intravenous  Anesthetic plan and risks discussed with: Patient      Previous thoracotomy with difficult intubation - required CMAC D-blade.  Discussed with patient and staff we will be conservative with depth of anesthesia in unsecured airway situation

## 2022-01-13 NOTE — PROGRESS NOTES

## 2022-01-13 NOTE — PROCEDURES
Carlin Goodpasture, M.D.  [phone]            2022          Colonoscopy Operative Report  [place] [facility]  :  1965  [facility] Medical Record Number:  [id number]      Indications:  Rectal bleeding     :  Jacob Davis MD    Assistant -- None  Implants -- None    Referring Provider: Annie Subramanian MD    Sedation:  MAC anesthesia Propofol    Pre-Procedural Exam:      Airway: clear,  No airway problems anticipated  Heart: RRR, without gallops or rubs  Lungs: clear bilaterally without wheezes, crackles, or rhonchi  Abdomen: soft, nontender, nondistended, bowel sounds present  Mental Status: awake, alert and oriented to person, place and time     Procedure Details:  After informed consent was obtained with all risks and benefits of procedure explained and preoperative exam completed, the patient was taken to the endoscopy suite and placed in the left lateral decubitus position. Upon sequential sedation as per above, a digital rectal exam was performed. The Olympus videocolonoscope  was inserted in the rectum and carefully advanced to the terminal ileum. The quality of preparation was good. The colonoscope was slowly withdrawn with careful inspection and evaluation between folds. Retroflexion in the rectum was performed. Findings:   Terminal Ileum: normal  Cecum: normal  Ascending Colon: normal  Transverse Colon: normal  Descending Colon: normal  Sigmoid: normal  Rectum: Grade 2 internal hemorrhoids    Interventions:  none    Specimen Removed:  none    Complications: None. EBL:  None. Impression: Diverticulosis noted throughout the colon more pronounced in the left side and mild on the right side                        Grade 2-3 internal hemorrhoids. Likely cause of rectal bleeding    Recommendations:  -Repeat colonoscopy in 10 years  -You will be set up for banding of your hemorrhoids.  If you have not heard from the office in a weeks time please call and have this set up      Discharge Disposition:  Home in the company of a  when able to ambulate.     Theresa Lobo MD  1/13/2022  10:16 AM

## 2022-01-13 NOTE — ANESTHESIA POSTPROCEDURE EVALUATION
Procedure(s):  COLONOSCOPY. MAC    Anesthesia Post Evaluation      Multimodal analgesia: multimodal analgesia not used between 6 hours prior to anesthesia start to PACU discharge  Patient location during evaluation: PACU  Patient participation: complete - patient participated  Level of consciousness: awake  Pain management: adequate  Airway patency: patent  Anesthetic complications: no  Cardiovascular status: acceptable, blood pressure returned to baseline and hemodynamically stable  Respiratory status: acceptable  Hydration status: acceptable  Post anesthesia nausea and vomiting:  controlled      INITIAL Post-op Vital signs:   Vitals Value Taken Time   /84 01/13/22 1024   Temp     Pulse 93 01/13/22 1029   Resp 25 01/13/22 1029   SpO2 96 % 01/13/22 1029   Vitals shown include unvalidated device data.

## 2022-03-18 PROBLEM — R00.0 SINUS TACHYCARDIA: Status: ACTIVE | Noted: 2020-01-18

## 2022-03-18 PROBLEM — J45.901 ASTHMA WITH EXACERBATION: Status: ACTIVE | Noted: 2018-10-14

## 2022-03-18 PROBLEM — E11.9 TYPE 2 DIABETES MELLITUS, WITHOUT LONG-TERM CURRENT USE OF INSULIN (HCC): Status: ACTIVE | Noted: 2020-01-17

## 2022-03-19 PROBLEM — R65.10 SYSTEMIC INFLAMMATORY RESPONSE SYNDROME (SIRS) (HCC): Status: ACTIVE | Noted: 2018-10-14

## 2022-03-19 PROBLEM — R50.9 FEVER: Status: ACTIVE | Noted: 2020-01-17

## 2022-03-19 PROBLEM — J84.9 ILD (INTERSTITIAL LUNG DISEASE) (HCC): Status: ACTIVE | Noted: 2019-03-25

## 2022-03-20 PROBLEM — J18.9 MULTIFOCAL PNEUMONIA: Status: ACTIVE | Noted: 2018-10-12

## 2022-03-20 PROBLEM — I10 HYPERTENSION: Status: ACTIVE | Noted: 2018-10-14

## 2022-06-16 ENCOUNTER — TRANSCRIBE ORDER (OUTPATIENT)
Dept: SCHEDULING | Age: 57
End: 2022-06-16

## 2022-06-16 DIAGNOSIS — Z12.31 SCREENING MAMMOGRAM FOR HIGH-RISK PATIENT: Primary | ICD-10-CM

## 2022-07-28 ENCOUNTER — TRANSCRIBE ORDER (OUTPATIENT)
Dept: REGISTRATION | Age: 57
End: 2022-07-28

## 2022-07-28 ENCOUNTER — HOSPITAL ENCOUNTER (OUTPATIENT)
Dept: GENERAL RADIOLOGY | Age: 57
Discharge: HOME | End: 2022-07-28
Payer: COMMERCIAL

## 2022-07-28 DIAGNOSIS — R93.89 ABNORMAL RADIOLOGICAL FINDINGS IN SKIN AND SUBCUTANEOUS TISSUE: Primary | ICD-10-CM

## 2022-07-28 DIAGNOSIS — R93.89 ABNORMAL RADIOLOGICAL FINDINGS IN SKIN AND SUBCUTANEOUS TISSUE: ICD-10-CM

## 2022-07-28 PROCEDURE — 71046 X-RAY EXAM CHEST 2 VIEWS: CPT

## 2022-08-01 ENCOUNTER — TRANSCRIBE ORDER (OUTPATIENT)
Dept: GENERAL RADIOLOGY | Age: 57
End: 2022-08-01

## 2022-08-01 DIAGNOSIS — R93.89 ABNORMAL CHEST XRAY: Primary | ICD-10-CM

## 2022-08-03 ENCOUNTER — HOSPITAL ENCOUNTER (OUTPATIENT)
Dept: MAMMOGRAPHY | Age: 57
Discharge: HOME | End: 2022-08-03
Attending: FAMILY MEDICINE

## 2022-08-03 DIAGNOSIS — Z12.31 SCREENING MAMMOGRAM FOR HIGH-RISK PATIENT: ICD-10-CM

## 2022-08-03 PROCEDURE — 77063 BREAST TOMOSYNTHESIS BI: CPT

## 2023-08-23 ENCOUNTER — HOSPITAL ENCOUNTER (OUTPATIENT)
Age: 58
Discharge: HOME | End: 2023-08-26
Payer: COMMERCIAL

## 2023-08-23 DIAGNOSIS — R06.02 SHORTNESS OF BREATH: ICD-10-CM

## 2023-08-23 PROCEDURE — 71046 X-RAY EXAM CHEST 2 VIEWS: CPT

## 2023-10-20 NOTE — PROGRESS NOTES
O2 sats on room air while at rest ranges 86-88%. O2 at 2 l applied. Sats at rest on O2 92%. Patient unable to ambulate on RA. Sats remain 86%-88%.    Ambulatig on O2 at 2l sats 92-95 verbal cues/1 person assist

## 2025-02-18 ENCOUNTER — TRANSCRIBE ORDERS (OUTPATIENT)
Facility: HOSPITAL | Age: 60
End: 2025-02-18

## 2025-02-18 ENCOUNTER — HOSPITAL ENCOUNTER (OUTPATIENT)
Facility: HOSPITAL | Age: 60
Discharge: HOME | End: 2025-02-21
Payer: COMMERCIAL

## 2025-02-18 DIAGNOSIS — J18.9 UNRESOLVED PNEUMONIA: ICD-10-CM

## 2025-02-18 DIAGNOSIS — J18.9 UNRESOLVED PNEUMONIA: Primary | ICD-10-CM

## 2025-02-18 PROCEDURE — 71046 X-RAY EXAM CHEST 2 VIEWS: CPT

## 2025-02-19 ENCOUNTER — HOSPITAL ENCOUNTER (OUTPATIENT)
Facility: HOSPITAL | Age: 60
Discharge: HOME | End: 2025-02-22
Attending: INTERNAL MEDICINE
Payer: COMMERCIAL

## 2025-02-19 DIAGNOSIS — R06.02 SHORTNESS OF BREATH: ICD-10-CM

## 2025-02-19 LAB — CREAT BLD-MCNC: 1 MG/DL (ref 0.6–1.3)

## 2025-02-19 PROCEDURE — 71275 CT ANGIOGRAPHY CHEST: CPT

## 2025-02-19 PROCEDURE — 82565 ASSAY OF CREATININE: CPT

## 2025-02-19 PROCEDURE — 6360000004 HC RX CONTRAST MEDICATION: Performed by: INTERNAL MEDICINE

## 2025-02-19 RX ORDER — IOPAMIDOL 755 MG/ML
100 INJECTION, SOLUTION INTRAVASCULAR
Status: COMPLETED | OUTPATIENT
Start: 2025-02-19 | End: 2025-02-19

## 2025-02-19 RX ADMIN — IOPAMIDOL 80 ML: 755 INJECTION, SOLUTION INTRAVENOUS at 13:51

## 2025-02-24 ENCOUNTER — TRANSCRIBE ORDERS (OUTPATIENT)
Facility: HOSPITAL | Age: 60
End: 2025-02-24

## 2025-02-24 DIAGNOSIS — R06.02 SHORTNESS OF BREATH: Primary | ICD-10-CM

## 2025-03-05 ENCOUNTER — TRANSCRIBE ORDERS (OUTPATIENT)
Facility: HOSPITAL | Age: 60
End: 2025-03-05

## 2025-03-05 DIAGNOSIS — Z12.31 OTHER SCREENING MAMMOGRAM: Primary | ICD-10-CM

## 2025-03-06 ENCOUNTER — HOSPITAL ENCOUNTER (OUTPATIENT)
Facility: HOSPITAL | Age: 60
Discharge: HOME | End: 2025-03-09
Payer: COMMERCIAL

## 2025-03-06 VITALS — BODY MASS INDEX: 57.13 KG/M2 | WEIGHT: 291 LBS | HEIGHT: 60 IN

## 2025-03-06 DIAGNOSIS — Z12.31 OTHER SCREENING MAMMOGRAM: ICD-10-CM

## 2025-03-06 PROCEDURE — 77063 BREAST TOMOSYNTHESIS BI: CPT

## 2025-04-25 ENCOUNTER — APPOINTMENT (OUTPATIENT)
Facility: HOSPITAL | Age: 60
End: 2025-04-25
Payer: COMMERCIAL

## 2025-04-25 ENCOUNTER — HOSPITAL ENCOUNTER (EMERGENCY)
Facility: HOSPITAL | Age: 60
Discharge: HOME | End: 2025-04-25
Attending: STUDENT IN AN ORGANIZED HEALTH CARE EDUCATION/TRAINING PROGRAM
Payer: COMMERCIAL

## 2025-04-25 VITALS
TEMPERATURE: 99 F | RESPIRATION RATE: 20 BRPM | SYSTOLIC BLOOD PRESSURE: 135 MMHG | WEIGHT: 285 LBS | HEART RATE: 88 BPM | HEIGHT: 60 IN | OXYGEN SATURATION: 98 % | DIASTOLIC BLOOD PRESSURE: 81 MMHG | BODY MASS INDEX: 55.95 KG/M2
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
## 2025-04-25 DIAGNOSIS — M48.061 SPINAL STENOSIS OF LUMBAR REGION, UNSPECIFIED WHETHER NEUROGENIC CLAUDICATION PRESENT: ICD-10-CM

## 2025-04-25 DIAGNOSIS — M54.32 BILATERAL SCIATICA: ICD-10-CM

## 2025-04-25 DIAGNOSIS — S39.012A STRAIN OF LUMBAR REGION, INITIAL ENCOUNTER: ICD-10-CM

## 2025-04-25 DIAGNOSIS — M54.31 BILATERAL SCIATICA: ICD-10-CM

## 2025-04-25 PROCEDURE — 6370000000 HC RX 637 (ALT 250 FOR IP): Performed by: STUDENT IN AN ORGANIZED HEALTH CARE EDUCATION/TRAINING PROGRAM

## 2025-04-25 PROCEDURE — 72131 CT LUMBAR SPINE W/O DYE: CPT

## 2025-04-25 PROCEDURE — 99284 EMERGENCY DEPT VISIT MOD MDM: CPT

## 2025-04-25 PROCEDURE — 72192 CT PELVIS W/O DYE: CPT

## 2025-04-25 RX ORDER — OXYCODONE AND ACETAMINOPHEN 5; 325 MG/1; MG/1
1 TABLET ORAL EVERY 8 HOURS PRN
Qty: 9 TABLET | Refills: 0 | Status: SHIPPED | OUTPATIENT
Start: 2025-04-25 | End: 2025-04-28

## 2025-04-25 RX ORDER — LIDOCAINE 4 G/G
3 PATCH TOPICAL
Status: DISCONTINUED | OUTPATIENT
Start: 2025-04-25 | End: 2025-04-25 | Stop reason: HOSPADM

## 2025-04-25 RX ORDER — OXYCODONE AND ACETAMINOPHEN 5; 325 MG/1; MG/1
1 TABLET ORAL
Refills: 0 | Status: COMPLETED | OUTPATIENT
Start: 2025-04-25 | End: 2025-04-25

## 2025-04-25 RX ADMIN — OXYCODONE AND ACETAMINOPHEN 1 TABLET: 325; 5 TABLET ORAL at 15:57

## 2025-04-25 ASSESSMENT — PAIN SCALES - GENERAL
PAINLEVEL_OUTOF10: 9
PAINLEVEL_OUTOF10: 9

## 2025-04-25 ASSESSMENT — PAIN DESCRIPTION - ORIENTATION: ORIENTATION: RIGHT;LEFT

## 2025-04-25 ASSESSMENT — PAIN DESCRIPTION - DESCRIPTORS: DESCRIPTORS: ACHING

## 2025-04-25 ASSESSMENT — PAIN - FUNCTIONAL ASSESSMENT: PAIN_FUNCTIONAL_ASSESSMENT: 0-10

## 2025-04-25 ASSESSMENT — PAIN DESCRIPTION - LOCATION
LOCATION: KNEE
LOCATION: BACK

## 2025-04-25 NOTE — ED PROVIDER NOTES
or weight on file to calculate BMI.    Physical Exam    DIAGNOSTIC RESULTS     EKG: All EKG's are interpreted by the Emergency Department Physician who either signs or Co-signs this chart in the absence of a cardiologist.         RADIOLOGY:   Non-plain film images such as CT, Ultrasound and MRI are read by the radiologist. Plain radiographic images are visualized and preliminarily interpreted by the emergency physician with the below findings:        Interpretation per the Radiologist below, if available at the time of this note:    No orders to display        LABS:  Labs Reviewed - No data to display    All other labs were within normal range or not returned as of this dictation.    EMERGENCY DEPARTMENT COURSE and DIFFERENTIAL DIAGNOSIS/MDM:   Vitals:  There were no vitals filed for this visit.        Medical Decision Making      CONSULTS:  None    PROCEDURES:  Unless otherwise noted below, none     Procedures    REASSESSMENT       **The patient has been re-evaluated and feeling much better and are stable for discharge**.  All available radiology and laboratory results have been reviewed with patient and/or available family.  Patient and/or family verbally conveyed their understanding and agreement of the patient's signs, symptoms, diagnosis, treatment and prognosis and additionally agree to follow-up as recommended in the discharge instructions or to return to the Emergency Department should their condition change or worsen prior to their follow-up appointment.  All questions have been answered and patient and/or available family express understanding.          FINAL IMPRESSION    No diagnosis found.      DISPOSITION/PLAN   DISPOSITION        PATIENT REFERRED TO:  No follow-up provider specified.    DISCHARGE MEDICATIONS:  New Prescriptions    No medications on file         (Please note that portions of this note were completed with a voice recognition program.  Efforts were made to edit the dictations but  Plain radiographic images are visualized and preliminarily interpreted by the emergency physician with the below findings:        Interpretation per the Radiologist below, if available at the time of this note:    CT LUMBAR SPINE WO CONTRAST   Final Result   1. No lumbar or pelvic fracture.   2. Left L4-L5 foraminal disc protrusion with neural foraminal stenosis.   3. Bilateral neural foraminal stenosis L4-S1. Moderate canal stenosis L4-L5.   More mild stenoses as described above.      Electronically signed by David Sarah      CT PELVIS WO CONTRAST Additional Contrast? None   Final Result   1. No lumbar or pelvic fracture.   2. Left L4-L5 foraminal disc protrusion with neural foraminal stenosis.   3. Bilateral neural foraminal stenosis L4-S1. Moderate canal stenosis L4-L5.   More mild stenoses as described above.      Electronically signed by David Sarah           LABS:  Labs Reviewed - No data to display    All other labs were within normal range or not returned as of this dictation.    EMERGENCY DEPARTMENT COURSE and DIFFERENTIAL DIAGNOSIS/MDM:   Vitals:    Vitals:    04/25/25 1525 04/25/25 1700   BP: (!) 144/106 135/81   Pulse: 97 88   Resp: 20    Temp: 98.2 °F (36.8 °C) 99 °F (37.2 °C)   TempSrc: Oral    SpO2: 98% 98%   Weight: 129.3 kg (285 lb)    Height: 1.524 m (5')            Medical Decision Making      DECISION MAKING:  Puja Jarrell is a 59 y.o. female who comes in as above.  Vital signs reviewed, blood pressure 144/106, vital signs otherwise stable.  On my examination, uncomfortable appearing, she has tenderness throughout the bilateral lumbar paraspinal muscles as well as the midline lumbar spine.  Lower extremities with normal strength and sensation intact throughout bilateral lower extremities.  Given the associated trauma as well as midline tenderness, will evaluate CT imaging of the lumbar spine and pelvis to evaluate for any traumatic injuries.  Given her anticoagulation status,

## 2025-04-25 NOTE — ED TRIAGE NOTES
Patient arrives to ER via wheelchair with c/c of worsening pain in both knees after falling about a week ago.   Patient was trying to get up from her wheelchair when her son moved the wheelchair back. Patient fell and rolled on her knees.  Denies trauma to the head.   Patient currently on Eliquis  [facility] arthritis, PE

## 2025-04-28 ASSESSMENT — ENCOUNTER SYMPTOMS: BACK PAIN: 1

## (undated) DEVICE — FORCEPS ENDO DIA1.8MM SERR CUP ALWAYS-ON TIP TRACKED

## (undated) DEVICE — CONNECTOR TBNG SUCTION 3/8X3/8X3/8 IN

## (undated) DEVICE — SUTURE PERMA-HAND 0 L18IN NONABSORBABLE BLK CT-1 L36MM 1/2 C021D

## (undated) DEVICE — SWAB CULT DBL W/O CHAR RAYON TIP AMIES GEL CLMN FOR COLL

## (undated) DEVICE — MAGNETIC DRAPE: Brand: DEVON

## (undated) DEVICE — SUTURE SZ 0 27IN 5/8 CIR UR-6  TAPER PT VIOLET ABSRB VICRYL J603H

## (undated) DEVICE — UNIVERSAL FIXATION CANNULA: Brand: VERSAONE

## (undated) DEVICE — GAUZE SPONGES,12 PLY: Brand: CURITY

## (undated) DEVICE — SYRINGE MED 20ML STD CLR PLAS LUERSLIP TIP N CTRL DISP

## (undated) DEVICE — SYRINGE MED 10CC ECC TIP W/O NDL

## (undated) DEVICE — (D)PREP SKN CHLRAPRP APPL 26ML -- CONVERT TO ITEM 371833

## (undated) DEVICE — PREP SKN CHLRAPRP SNGL 1.75ML --

## (undated) DEVICE — AGENT HEMSTAT W2XL14IN OXIDIZED REGENERATED CELOS ABSRB FOR

## (undated) DEVICE — TELFA ADHESIVE ISLAND DRESSING: Brand: TELFA

## (undated) DEVICE — Device: Brand: MEDICAL ACTION INDUSTRIES

## (undated) DEVICE — SOLUTION IRRIG 1000ML H2O STRL BLT

## (undated) DEVICE — UNIVERSAL FIXATION CANNULA: Brand: VERSAPORT

## (undated) DEVICE — ELECTRODE,RADIOTRANSLUCENT,FOAM,3PK: Brand: MEDLINE

## (undated) DEVICE — BLADE ELECTRODE: Brand: EDGE

## (undated) DEVICE — KENDALL RADIOLUCENT FOAM MONITORING ELECTRODE -RECTANGULAR SHAPE: Brand: KENDALL

## (undated) DEVICE — BASIN SPNG 32OZ BLU STRL --

## (undated) DEVICE — BRUSH NAVIGATION SYS L15MM DIA1.8MM ALWAYS ON TIP TRACKED

## (undated) DEVICE — 3M™ CUROS™ DISINFECTING CAP FOR NEEDLELESS CONNECTORS 270/CARTON 20 CARTONS/CASE CFF1-270: Brand: CUROS™

## (undated) DEVICE — REM POLYHESIVE ADULT PATIENT RETURN ELECTRODE: Brand: VALLEYLAB

## (undated) DEVICE — SOLIDIFIER MEDC 1200ML -- CONVERT TO 356117

## (undated) DEVICE — KIT SEALNT PLEURAL PRGL 4ML --

## (undated) DEVICE — HANDLE LT SNAP ON ULT DURABLE LENS FOR TRUMPF ALC DISPOSABLE

## (undated) DEVICE — SOLUTION IV 1000ML 0.9% SOD CHL

## (undated) DEVICE — Device

## (undated) DEVICE — SUTURE VCRL SZ 3-0 L27IN ABSRB UD L26MM SH 1/2 CIR J416H

## (undated) DEVICE — CATHETER IV 20GA L1IN FEP STR HUB INTROCAN SFTY

## (undated) DEVICE — 1200 GUARD II KIT W/5MM TUBE W/O VAC TUBE: Brand: GUARDIAN

## (undated) DEVICE — RELOAD STPL 45MM THCK TISS GRN W/ GRIPPING SURF TECHNOLOGY

## (undated) DEVICE — ADULT SPO2 SENSOR: Brand: NELLCOR

## (undated) DEVICE — SPONGE TONSIL MED X RAY DETECTABLE STRL LTX FREE

## (undated) DEVICE — CATH IV AUTOGRD BC PNK 20GA 25 -- INSYTE

## (undated) DEVICE — SET ADMIN 16ML TBNG L100IN 2 Y INJ SITE IV PIGGY BK DISP

## (undated) DEVICE — SYR 3ML LL TIP 1/10ML GRAD --

## (undated) DEVICE — TRAP SUC MUCOUS 70ML -- MEDICHOICE MEDLINE

## (undated) DEVICE — SURGICAL PROCEDURE KIT GEN LAPAROSCOPY LF

## (undated) DEVICE — CATH SUC CTRL PRT TRIFLO 14FR --

## (undated) DEVICE — BASIN EMSIS 16OZ GRAPHITE PLAS KID SHP MOLD GRAD FOR ORAL

## (undated) DEVICE — MEDI-VAC NON-CONDUCTIVE SUCTION TUBING: Brand: CARDINAL HEALTH

## (undated) DEVICE — FORCEPS BX L240CM JAW DIA2.8MM L CAP W/ NDL MIC MESH TOOTH

## (undated) DEVICE — KENDALL SCD EXPRESS SLEEVES, KNEE LENGTH, MEDIUM: Brand: KENDALL SCD

## (undated) DEVICE — I.V. DRAIN SPONGES: Brand: DERMACEA

## (undated) DEVICE — Z CONVERTED USE 2271148 CONNECTOR TBNG POLYPR 5IN1 TOUGH SHATTERPROOF FOR 5-11MM TB

## (undated) DEVICE — STERILE POLYISOPRENE POWDER-FREE SURGICAL GLOVES WITH EMOLLIENT COATING: Brand: PROTEXIS

## (undated) DEVICE — SOLIDIFIER FLUID 3000 CC ABSORB

## (undated) DEVICE — SYR LR LCK 1ML GRAD NSAF 30ML --

## (undated) DEVICE — CANN NASAL O2 CAPNOGRAPHY AD -- FILTERLINE

## (undated) DEVICE — NEONATAL-ADULT SPO2 SENSOR: Brand: NELLCOR

## (undated) DEVICE — CUFF RMFG BP INF SZ 11L DISP --

## (undated) DEVICE — CLIP LIG ADH PD W SLOT HORZ --

## (undated) DEVICE — 3M™ IOBAN™ 2 ANTIMICROBIAL INCISE DRAPE 6648EZ: Brand: IOBAN™ 2

## (undated) DEVICE — AIRLIFE™ ADULT CUSHION NASAL CANNULA 14 FOOT (4.3) CRUSH-RESISTANT OXYGEN TUBING, AND U/CONNECT-IT ADAPTER: Brand: AIRLIFE™

## (undated) DEVICE — DRAPE,REIN 53X77,STERILE: Brand: MEDLINE

## (undated) DEVICE — CATH IV AUTOGRD BC BLU 22GA 25 -- INSYTE

## (undated) DEVICE — CATHETER THORACENTESIS STR 28 FRX23 IN 6 EYELET TAPR TIP LF

## (undated) DEVICE — Z CONVERTED USE 2274305 CUFF BLD PRSS AD L SZ 12 FOR 32-43CM LIMB VLY SFT W/O TUBE

## (undated) DEVICE — SUTURE VCRL SZ 4-0 L27IN ABSRB UD L19MM PS-2 3/8 CIR PRIM J426H

## (undated) DEVICE — NEEDLE SPNL 22GA L3.5IN BLK HUB S STL REG WALL FIT STYL W/

## (undated) DEVICE — KIT COLON W/ 1.1OZ LUB AND 2 END

## (undated) DEVICE — RELOAD STPL L45MM EXTRA THCK TISS BLK ARTC INTELLIGENCE FOR

## (undated) DEVICE — INFECTION CONTROL KIT SYS

## (undated) DEVICE — PAD 05IN BASE 3IN PEAK M DENS CONVOLUTED FOAM

## (undated) DEVICE — BAG BELONG PT PERS CLEAR HANDL

## (undated) DEVICE — NDL FLTR TIP 5 MIC 18GX1.5IN --

## (undated) DEVICE — PROTECTOR E TIP CLR PLAS TB

## (undated) DEVICE — TIP SUCT BLU PLAS BLB W/O CTRL VENT YANK

## (undated) DEVICE — SINGLE USE SUCTION VALVE MAJ-209: Brand: SINGLE USE SUCTION VALVE (STERILE)

## (undated) DEVICE — DEVON™ KNEE AND BODY STRAP 60" X 3" (1.5 M X 7.6 CM): Brand: DEVON

## (undated) DEVICE — BAG SPEC BIOHZRD 10 X 10 IN --

## (undated) DEVICE — CONTAINER,SPECIMEN,3OZ,OR STRL: Brand: MEDLINE

## (undated) DEVICE — NEEDLE HYPO 22GA L1.5IN BLK S STL HUB POLYPR SHLD REG BVL

## (undated) DEVICE — TOWEL SURG W17XL27IN STD BLU COT NONFENESTRATED PREWASHED

## (undated) DEVICE — TELFA NON-ADHERENT ABSORBENT DRESSING: Brand: TELFA

## (undated) DEVICE — INFANT SPO2 SENSOR: Brand: NELLCOR

## (undated) DEVICE — SYR 5ML 1/5 GRAD LL NSAF LF --

## (undated) DEVICE — CATH FOL TY IC BAG 16FR 2000ML -- CONVERT TO ITEM 363158

## (undated) DEVICE — SOL IRRIGATION INJ NACL 0.9% 500ML BTL

## (undated) DEVICE — BITEBLOCK ENDOSCP 60FR MAXI WHT POLYETH STURDY W/ VELC WVN

## (undated) DEVICE — SUTURE VCRL SZ 0 L36IN ABSRB VLT L40MM CT 1/2 CIR J358H

## (undated) DEVICE — APPLICATOR BNDG 1MM ADH PREMIERPRO EXOFIN

## (undated) DEVICE — TIP CLEANER: Brand: VALLEYLAB

## (undated) DEVICE — SINGLE USE BIOPSY VALVE MAJ-210: Brand: SINGLE USE BIOPSY VALVE (STERILE)

## (undated) DEVICE — SET GRAV CK VLV NEEDLESS ST 3 GANGED 4WAY STPCOCK HI FLO 10

## (undated) DEVICE — SYR 10ML LUER LOK 1/5ML GRAD --

## (undated) DEVICE — CONTAINER SPEC 20 ML LID NEUT BUFF FORMALIN 10 % POLYPR STS

## (undated) DEVICE — QUILTED PREMIUM COMFORT UNDERPAD,EXTRA HEAVY: Brand: WINGS

## (undated) DEVICE — BITE BLK ENDOSCP AD 54FR GRN POLYETH ENDOSCP W STRP SLD